# Patient Record
Sex: FEMALE | Race: WHITE | HISPANIC OR LATINO | Employment: FULL TIME | URBAN - METROPOLITAN AREA
[De-identification: names, ages, dates, MRNs, and addresses within clinical notes are randomized per-mention and may not be internally consistent; named-entity substitution may affect disease eponyms.]

---

## 2023-04-28 ENCOUNTER — APPOINTMENT (OUTPATIENT)
Dept: RADIOLOGY | Facility: CLINIC | Age: 38
End: 2023-04-28

## 2023-04-28 ENCOUNTER — APPOINTMENT (OUTPATIENT)
Dept: URGENT CARE | Facility: CLINIC | Age: 38
End: 2023-04-28

## 2023-04-28 DIAGNOSIS — M25.531 ACUTE PAIN OF BOTH WRISTS: Primary | ICD-10-CM

## 2023-04-28 DIAGNOSIS — M25.532 ACUTE PAIN OF BOTH WRISTS: Primary | ICD-10-CM

## 2023-05-06 ENCOUNTER — APPOINTMENT (OUTPATIENT)
Dept: URGENT CARE | Facility: CLINIC | Age: 38
End: 2023-05-06

## 2023-06-06 ENCOUNTER — APPOINTMENT (OUTPATIENT)
Dept: URGENT CARE | Facility: CLINIC | Age: 38
End: 2023-06-06
Payer: OTHER MISCELLANEOUS

## 2023-06-06 PROCEDURE — 99213 OFFICE O/P EST LOW 20 MIN: CPT | Performed by: FAMILY MEDICINE

## 2023-07-07 ENCOUNTER — OFFICE VISIT (OUTPATIENT)
Dept: URGENT CARE | Facility: CLINIC | Age: 38
End: 2023-07-07
Payer: OTHER MISCELLANEOUS
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
## 2023-07-07 PROCEDURE — 99213 OFFICE O/P EST LOW 20 MIN: CPT

## 2023-07-07 NOTE — PROGRESS NOTES
This encounter was created for [facility] orders only . [facility]        NAME: Connor Urena is a 45 y.o. female  : 1985    MRN: [id number]  DATE: 2023  TIME: 1:16 PM    There were no vitals taken for this visit. Assessment and Plan   No primary diagnosis found. No diagnosis found. Patient Instructions       Follow up with PCP in 3-5 days. Proceed to  ER if symptoms worsen. Chief Complaint   No chief complaint on file. History of Present Illness       HPI    Review of Systems   Review of Systems      Current Medications     No current outpatient medications on file. Current Allergies     Allergies as of 2023   • (Not on File)            The following portions of the patient's history were reviewed and updated as appropriate: allergies, current medications, past family history, past medical history, past social history, past surgical history and problem list.     No past medical history on file. No past surgical history on file. No family history on file. Medications have been verified. Objective   There were no vitals taken for this visit.        Physical Exam     Physical Exam

## 2023-07-18 ENCOUNTER — APPOINTMENT (OUTPATIENT)
Dept: URGENT CARE | Facility: CLINIC | Age: 38
End: 2023-07-18
Payer: OTHER MISCELLANEOUS

## 2023-07-18 PROCEDURE — 99213 OFFICE O/P EST LOW 20 MIN: CPT | Performed by: FAMILY MEDICINE

## 2023-08-16 ENCOUNTER — APPOINTMENT (OUTPATIENT)
Dept: URGENT CARE | Facility: CLINIC | Age: 38
End: 2023-08-16
Payer: OTHER MISCELLANEOUS

## 2023-08-16 PROCEDURE — 99213 OFFICE O/P EST LOW 20 MIN: CPT

## 2023-09-15 ENCOUNTER — APPOINTMENT (OUTPATIENT)
Dept: URGENT CARE | Facility: CLINIC | Age: 38
End: 2023-09-15
Payer: OTHER MISCELLANEOUS

## 2023-09-15 PROCEDURE — 99213 OFFICE O/P EST LOW 20 MIN: CPT

## 2023-10-05 ENCOUNTER — TELEPHONE (OUTPATIENT)
Dept: OBGYN CLINIC | Facility: HOSPITAL | Age: 38
End: 2023-10-05

## 2023-10-05 NOTE — TELEPHONE ENCOUNTER
Are you able to contact this patient and make sure a MRI disk, from 8/20/23, is provided at the office visit 10/9/23? Report receieved but need the imaging/films of the Mri.

## 2023-10-06 NOTE — TELEPHONE ENCOUNTER
I saw the report in the [facility] notes. I need the pictures of the MRI.  Patient needs to bring a disk

## 2023-10-09 ENCOUNTER — OFFICE VISIT (OUTPATIENT)
Dept: OBGYN CLINIC | Facility: CLINIC | Age: 38
End: 2023-10-09
Payer: OTHER MISCELLANEOUS

## 2023-10-09 VITALS
BODY MASS INDEX: 24.03 KG/M2 | DIASTOLIC BLOOD PRESSURE: 70 MMHG | WEIGHT: 135.6 LBS | SYSTOLIC BLOOD PRESSURE: 110 MMHG | HEIGHT: 63 IN

## 2023-10-09 DIAGNOSIS — S69.81XA INJURY OF TRIANGULAR FIBROCARTILAGE COMPLEX (TFCC) OF RIGHT WRIST, INITIAL ENCOUNTER: Primary | ICD-10-CM

## 2023-10-09 DIAGNOSIS — M65.4 TENOSYNOVITIS, DE QUERVAIN: ICD-10-CM

## 2023-10-09 PROCEDURE — 99214 OFFICE O/P EST MOD 30 MIN: CPT | Performed by: ORTHOPAEDIC SURGERY

## 2023-10-09 RX ORDER — NAPROXEN 250 MG/1
250 TABLET ORAL 2 TIMES DAILY WITH MEALS
COMMUNITY

## 2023-10-09 RX ORDER — ZALEPLON 10 MG/1
CAPSULE ORAL
COMMUNITY
Start: 2023-09-05

## 2023-10-09 NOTE — PROGRESS NOTES
ASSESSMENT/PLAN:    Assessment:   Right wrist TFCC injury, Right De Quervain's tenosynovitis    Plan:   We discussed with the patient that as she is feeling about 80% better compared to her time of injury today, she can continue nonoperative management for now. We recommended that she continue to follow with her hematologist as scheduled for the new unexplained bruising that she is experiencing throughout her body. We discussed that we could give her an injection at a later date, however it is too soon at this point because she has recently had an injection by an outside surgeon. We will see the patient back in 6 weeks. In the meantime, we have increased her restrictions to allow her to bear weight as tolerated with physical therapy, however she will be work strict to 10 pounds by work for now. Follow Up:  6  week(s)      General Discussions:     Christina Justice Tenosynovitis: The anatomy and physiology of de Quervain's tenosynovitis was discussed with the patient today in the office. Edema and increased contact pressure within the first dorsal extensor compartment at the radial styloid can cause pain, crepitation, and limitation of function. Treatment options include resting thumb spica splints to decrease edema, oral anti-inflammatory medications, home or formal therapy exercises, up to 2 steroid injections within the first dorsal extensor compartment, or surgical release. While majority of patients do respond to conservative treatment, up to 20% may require surgical release.     _____________________________________________________  CHIEF COMPLAINT:  Chief Complaint   Patient presents with   • Right Wrist - Injury     DOI 4/26/23XR 4/28/23 MRI 8/20/23 report in encounters  MRI films in [facility]          SUBJECTIVE:  Margaux Reed is a 45y.o. year old female who presents for initial presentation of right-sided wrist pain.   Patient states that she was at work lifting a pallet, earlier in April of this year, when she had immediate pain to her right wrist.  Patient has subsequently seen urgent care, tried a splint that was given to her, and is continuing to have pain in the right wrist.  She has tried and failed physical therapy. She has seen orthopedic surgeons at an outside institution who performed a radiocarpal injection as well as to corticosteroid injections for English veins tenosynovitis, both with mild relief of pain. However, patient continues to have pain in the right wrist.  She was told by her work that she is not able to return to any form of light duty, that they would like to see her only once she is able to perform at her full duty levels. She would like to know what can be done for her right-sided wrist pain. She has an MRI that was done in August, demonstrating a tear of the distal radioulnar ligament. PAST MEDICAL HISTORY:  History reviewed. No pertinent past medical history. PAST SURGICAL HISTORY:  History reviewed. No pertinent surgical history. FAMILY HISTORY:  History reviewed. No pertinent family history. SOCIAL HISTORY:  Social History     Tobacco Use   • Smoking status: Never   • Smokeless tobacco: Never       MEDICATIONS:    Current Outpatient Medications:   •  Diclofenac Sodium (VOLTAREN) 1 %, APPLY TO AFFECTED AREA FOUR TIMES A DAY, Disp: , Rfl:   •  naproxen (NAPROSYN) 250 mg tablet, Take 250 mg by mouth 2 (two) times a day with meals, Disp: , Rfl:   •  zaleplon (SONATA) 10 MG capsule, TAKE 1 CAPSULE ORALLY AT BEDTIME AS NEEDED FOR INSOMNIA, Disp: , Rfl:     ALLERGIES:  Allergies   Allergen Reactions   • Shellfish-Derived Products - Food Allergy Anaphylaxis       REVIEW OF SYSTEMS:  Pertinent items are noted in HPI. A comprehensive review of systems was negative.     LABS:  HgA1c: No results found for: "HGBA1C"  BMP: No results found for: "GLUCOSE", "CALCIUM", "NA", "K", "CO2", "CL", "BUN", "CREATININE"    _____________________________________________________  PHYSICAL EXAMINATION:  General: well developed and well nourished, alert, oriented times 3 and appears comfortable  Psychiatric: Normal  HEENT: Trachea Midline, No torticollis  Cardiovascular: No discernable arrhythmia  Pulmonary: No wheezing or stridor  Skin: No masses, erythema, lacerations, fluctation, ulcerations  Neurovascular: Sensation Intact to the Median, Ulnar, Radial Nerve, Motor Intact to the Median, Ulnar, Radial Nerve and Pulses Intact    MUSCULOSKELETAL EXAMINATION:  Right side:  Tenderness palpation along radial aspect of dorsal forearm along De Quervains tendon sheath  Positive Finkelstein test  Positive Eickhoff test  Tenderness palpation along dorsal aspect of radiocarpal joints  Negative DRUJ shuck instability test  Positive tenderness over TFCC  Positive fovea test   Negative Tinel's sign   Negative Phalen's test  Positive Cora's    _____________________________________________________  STUDIES REVIEWED:  MRI of right wrist from 8/30 were reviewed in PACS by Dr. Hailee Bowman and demonstrate: Tear of dorsal aspect of TFCC.       PROCEDURES PERFORMED:  Procedures  No Procedures performed today

## 2023-10-09 NOTE — LETTER
October 9, 2023     Patient: Navya Calderon  YOB: 1985  Date of Visit: 10/9/2023      To Whom it May Concern:    Navya Calderon is under my professional care. Michelle Alvarado was seen in my office on 10/9/2023. Michelle Alvarado may return to work with limitations of light duty of 10 lbs until cleared by Orthopedic Surgery . We anticipate that she may be able to lift 20 lbs by that time. If you have any questions or concerns, please don't hesitate to call.          Sincerely,          Jason Robison MD        CC: No Recipients

## 2023-10-10 ENCOUNTER — TELEPHONE (OUTPATIENT)
Age: 38
End: 2023-10-10

## 2023-10-10 NOTE — TELEPHONE ENCOUNTER
Caller: Merlyn Zendejas from [employer]    Doctor: Braxton Bahena    Reason for call:     Faxed offices notes & work note from 10/9/23 visit to fax # [phone].   Completed    Call back#: n/a

## 2023-10-24 DIAGNOSIS — M65.4 TENOSYNOVITIS, DE QUERVAIN: Primary | ICD-10-CM

## 2023-10-24 DIAGNOSIS — S69.81XA INJURY OF TRIANGULAR FIBROCARTILAGE COMPLEX (TFCC) OF RIGHT WRIST, INITIAL ENCOUNTER: ICD-10-CM

## 2023-11-20 ENCOUNTER — OFFICE VISIT (OUTPATIENT)
Dept: OBGYN CLINIC | Facility: CLINIC | Age: 38
End: 2023-11-20
Payer: OTHER MISCELLANEOUS

## 2023-11-20 VITALS
HEIGHT: 63 IN | SYSTOLIC BLOOD PRESSURE: 103 MMHG | BODY MASS INDEX: 23.92 KG/M2 | WEIGHT: 135 LBS | DIASTOLIC BLOOD PRESSURE: 80 MMHG

## 2023-11-20 DIAGNOSIS — M65.4 TENOSYNOVITIS, DE QUERVAIN: Primary | ICD-10-CM

## 2023-11-20 DIAGNOSIS — S69.81XA INJURY OF TRIANGULAR FIBROCARTILAGE COMPLEX (TFCC) OF RIGHT WRIST, INITIAL ENCOUNTER: ICD-10-CM

## 2023-11-20 PROCEDURE — 20550 NJX 1 TENDON SHEATH/LIGAMENT: CPT | Performed by: ORTHOPAEDIC SURGERY

## 2023-11-20 PROCEDURE — 99213 OFFICE O/P EST LOW 20 MIN: CPT | Performed by: ORTHOPAEDIC SURGERY

## 2023-11-20 PROCEDURE — 20605 DRAIN/INJ JOINT/BURSA W/O US: CPT | Performed by: ORTHOPAEDIC SURGERY

## 2023-11-20 RX ORDER — LIDOCAINE HYDROCHLORIDE 10 MG/ML
1 INJECTION, SOLUTION INFILTRATION; PERINEURAL
Status: COMPLETED | OUTPATIENT
Start: 2023-11-20 | End: 2023-11-20

## 2023-11-20 RX ORDER — BETAMETHASONE SODIUM PHOSPHATE AND BETAMETHASONE ACETATE 3; 3 MG/ML; MG/ML
6 INJECTION, SUSPENSION INTRA-ARTICULAR; INTRALESIONAL; INTRAMUSCULAR; SOFT TISSUE
Status: COMPLETED | OUTPATIENT
Start: 2023-11-20 | End: 2023-11-20

## 2023-11-20 RX ADMIN — LIDOCAINE HYDROCHLORIDE 1 ML: 10 INJECTION, SOLUTION INFILTRATION; PERINEURAL at 17:15

## 2023-11-20 RX ADMIN — BETAMETHASONE SODIUM PHOSPHATE AND BETAMETHASONE ACETATE 6 MG: 3; 3 INJECTION, SUSPENSION INTRA-ARTICULAR; INTRALESIONAL; INTRAMUSCULAR; SOFT TISSUE at 17:15

## 2023-11-20 NOTE — LETTER
November 20, 2023     Patient: Saeed Jiang  YOB: 1985  Date of Visit: 11/20/2023      To Whom it May Concern:    Saeed Jiang is under my professional care. Denisse Hickman was seen in my office on 11/20/2023. Denisse Hickman will continue her current work restrictions. She will be re-evaluated in 8 weeks time. If you have any questions or concerns, please don't hesitate to call.          Sincerely,          Randi Kothari MD

## 2023-11-20 NOTE — PROGRESS NOTES
ASSESSMENT/PLAN:    Assessment:   Right wrist TFCC injury, Right De Quervain's tenosynovitis    Plan:   A final right De Quervain's CSI was performed in the office without complication. A repeat right radiocarpal joint CSI was performed in the office without complication. Post injection protocol/expectations were reviewed. She will continue OT for work hardening, updated script was provided. We briefly discussed surgical intervention if symptoms worsen or fail to improve. Updated work note was provided, continuing same restrictions. Follow Up:  8  week(s)    To Do Next Visit:  Re-evaluation     _____________________________________________________  CHIEF COMPLAINT:  Chief Complaint   Patient presents with    Right Wrist - Follow-up     Right wrist TFCC injury, Right De Quervain's tenosynovitis         SUBJECTIVE:  Suma Shelton is a 45 y.o. female who presents for follow up regarding  Right wrist TFCC injury and right De Quervain's tenosynovitis. Since last visit, Suma Shelton has been attending therapy. She is working on work hardening exercises in therapy. She notes 2/10 pain to her right wrist at times. Pain is to the dorsal radial and dorsal ulnar aspect of her wrist. She feels pain is worse due to the colder weather. She also notes pain with overhead weighted activities in therapy. She is using Voltaren Gel and taking Naproxen as needed for pain control. She is wearing a cock up wrist brace as needed with activities. Overall she feels her symptoms have improved approx. 80 percent. Work status: [employer]    PAST MEDICAL HISTORY:  History reviewed. No pertinent past medical history. PAST SURGICAL HISTORY:  History reviewed. No pertinent surgical history. FAMILY HISTORY:  History reviewed. No pertinent family history.     SOCIAL HISTORY:  Social History     Tobacco Use    Smoking status: Never    Smokeless tobacco: Never       MEDICATIONS:    Current Outpatient Medications:     Diclofenac Sodium I have reviewed discharge instructions with the patient. The patient verbalized understanding. Patient was not given prescription. Pt in wheelchair to lobby upon discharge home. (VOLTAREN) 1 %, APPLY TO AFFECTED AREA FOUR TIMES A DAY, Disp: , Rfl:     naproxen (NAPROSYN) 250 mg tablet, Take 250 mg by mouth 2 (two) times a day with meals, Disp: , Rfl:     zaleplon (SONATA) 10 MG capsule, TAKE 1 CAPSULE ORALLY AT BEDTIME AS NEEDED FOR INSOMNIA, Disp: , Rfl:     ALLERGIES:  Allergies   Allergen Reactions    Shellfish-Derived Products - Food Allergy Anaphylaxis       REVIEW OF SYSTEMS:  Pertinent items are noted in HPI. A comprehensive review of systems was negative. LABS:  HgA1c: No results found for: "HGBA1C"  BMP: No results found for: "GLUCOSE", "CALCIUM", "NA", "K", "CO2", "CL", "BUN", "CREATININE"        _____________________________________________________  PHYSICAL EXAMINATION:  Vital signs: /80   Ht 5' 3" (1.6 m)   Wt 61.2 kg (135 lb)   BMI 23.91 kg/m²   General: well developed and well nourished, alert, oriented times 3, and appears comfortable  Psychiatric: Normal  HEENT: Trachea Midline, No torticollis  Cardiovascular: No discernable arrhythmia  Pulmonary: No wheezing or stridor  Abdomen: No rebound or guarding  Extremities: No peripheral edema  Skin: No masses, erythema, lacerations, fluctation, ulcerations  Neurovascular: Sensation Intact to the Median, Ulnar, Radial Nerve, Motor Intact to the Median, Ulnar, Radial Nerve, and Pulses Intact    MUSCULOSKELETAL EXAMINATION:    RIGHT SIDE:  Wrist: TFCC tender to palpation, + finkelstein test, + cyst to 1st dorsal compartment     _____________________________________________________  STUDIES REVIEWED:  No Studies to review      PROCEDURES PERFORMED:  Hand/upper extremity injection: R extensor compartment 1  Universal Protocol:  Consent: Verbal consent obtained. Written consent not obtained.   Risks and benefits: risks, benefits and alternatives were discussed  Consent given by: patient  Time out: Immediately prior to procedure a "time out" was called to verify the correct patient, procedure, equipment, support staff and site/side marked as required. Patient understanding: patient states understanding of the procedure being performed  Site marked: the operative site was marked  Patient identity confirmed: verbally with patient  Supporting Documentation  Indications: pain   Procedure Details  Condition:de Quervain's tenosynovitis Site: R extensor compartment 1   Preparation: Patient was prepped and draped in the usual sterile fashion  Needle size: 25 G  Ultrasound guidance: no  Medications administered: 1 mL lidocaine 1 %; 6 mg betamethasone acetate-betamethasone sodium phosphate 6 (3-3) mg/mL  Patient tolerance: patient tolerated the procedure well with no immediate complications  Dressing:  Sterile dressing applied       Medium joint arthrocentesis: R radiocarpal  Universal Protocol:  Consent: Verbal consent obtained. Written consent not obtained. Risks and benefits: risks, benefits and alternatives were discussed  Consent given by: patient  Time out: Immediately prior to procedure a "time out" was called to verify the correct patient, procedure, equipment, support staff and site/side marked as required.   Patient understanding: patient states understanding of the procedure being performed  Site marked: the operative site was marked  Patient identity confirmed: verbally with patient  Supporting Documentation  Indications: pain   Procedure Details  Location: wrist - R radiocarpal  Preparation: Patient was prepped and draped in the usual sterile fashion  Needle size: 25 G  Ultrasound guidance: no  Medications administered: 1 mL lidocaine 1 %; 6 mg betamethasone acetate-betamethasone sodium phosphate 6 (3-3) mg/mL    Patient tolerance: patient tolerated the procedure well with no immediate complications  Dressing:  Sterile dressing applied             Scribe Attestation      I,:  Yoselin Alcaraz am acting as a scribe while in the presence of the attending physician.:       I,:  Paul Samson MD personally performed the services described in this documentation    as scribed in my presence.:

## 2023-11-21 ENCOUNTER — TELEPHONE (OUTPATIENT)
Dept: OBGYN CLINIC | Facility: CLINIC | Age: 38
End: 2023-11-21

## 2023-11-21 ENCOUNTER — TELEPHONE (OUTPATIENT)
Age: 38
End: 2023-11-21

## 2023-11-21 NOTE — TELEPHONE ENCOUNTER
Caller: Mariano Ortiz [facility]    Doctor/Office: Abis[facility]    What needs to be faxed: Office note/work status    ATTN to: Glo Reyes    Fax#: [phone]      Documents were successfully e-faxed

## 2023-11-21 NOTE — TELEPHONE ENCOUNTER
Received incoming fax from [employer] requesting office note, Letter, PT/OT script be faxed to [phone]. Faxed information received confirmation.

## 2023-11-27 ENCOUNTER — TELEPHONE (OUTPATIENT)
Age: 38
End: 2023-11-27

## 2023-11-27 NOTE — TELEPHONE ENCOUNTER
Called and spoke with Courtney Claros, pt has been out since April and Courtney Claros stated she was wondering if her weight restriction to only lift 10lbs should be increased as she has been lifting 10lbs for 18 sessions with PT. Courtney Claros would like to know where does the pt go from here as it seems like she is not getting better and will not be able to return to work until she is able to lift more weight.

## 2023-11-27 NOTE — TELEPHONE ENCOUNTER
Called and spoke with Mehul Prado and relayed Chiara Escamilla msg, no further questions at this time.

## 2023-11-27 NOTE — TELEPHONE ENCOUNTER
Caller: Georgiann Hashimoto    Doctor: [facility]    Reason for call: Requesting clarification her patients work status. She is currently out of work and Miller Shadow is asking if the patient needs to remain out of work until her follow up or can she work with restrictions.      Call back#: [phone]  FAX#: [phone]

## 2023-11-27 NOTE — TELEPHONE ENCOUNTER
Please advise Kathy Zelaya that we would like to keep her on her continued lifting restriction at this time. If symptoms do not seem to improve that the next time that she is reevaluated, there may be the discussion of surgical intervention. Thank you.

## 2023-11-27 NOTE — TELEPHONE ENCOUNTER
Can we please advise work comp that her work restrictions are the same from her previous visit which are the following:  [employer] may return to work with limitations of light duty of 10 lbs. thank you.

## 2024-01-22 ENCOUNTER — OFFICE VISIT (OUTPATIENT)
Dept: OBGYN CLINIC | Facility: CLINIC | Age: 39
End: 2024-01-22
Payer: OTHER MISCELLANEOUS

## 2024-01-22 VITALS
BODY MASS INDEX: 24.06 KG/M2 | DIASTOLIC BLOOD PRESSURE: 84 MMHG | WEIGHT: 135.8 LBS | HEIGHT: 63 IN | SYSTOLIC BLOOD PRESSURE: 126 MMHG

## 2024-01-22 DIAGNOSIS — M65.4 TENOSYNOVITIS, DE QUERVAIN: Primary | ICD-10-CM

## 2024-01-22 DIAGNOSIS — S69.81XD INJURY OF TRIANGULAR FIBROCARTILAGE COMPLEX (TFCC) OF RIGHT WRIST, SUBSEQUENT ENCOUNTER: ICD-10-CM

## 2024-01-22 PROCEDURE — 99214 OFFICE O/P EST MOD 30 MIN: CPT | Performed by: ORTHOPAEDIC SURGERY

## 2024-01-22 NOTE — PROGRESS NOTES
ASSESSMENT/PLAN:    Assessment:   Right wrist TFCC injury/tear, Right De Quervain's tenosynovitis      Plan:   Right wrist De Quervain's release and right wrist arthroscopy with TFCC debridement vs repair was discussed at length including risks and benefits.  Risks of surgery are listed below. Risks of surgery consist of but not limited to bleeding, infection, stiffness, pain, anesthesia complications, failure, need for further surgery, injury to nerves blood vessels and surrounding structures, etc. We discussed the De Quervain's release has an approx. 98 percent success rate and the wrist arthroscopy has an approx. 93/94 percent success rate.   If a repair is performed, we discussed a longer recovery as to if only a debridement is performed. If a repair is performed, we discussed a munster cast for 6 weeks followed by OT, which would be an approx. 4 month recovery.   Juhi elected to proceed with surgical intervention and informed surgical consent was signed.     Updated work note was provided, continuing current 10 lb restrictions. She will be re-evaluated in the office 10-14 days post op.     Follow Up:  After Surgery    To Do Next Visit:  Sutures out      Operative Discussions:     De Quervain Release:   The anatomy and physiology of de Quervain's tenosynovitis was discussed with the patient today in the office.  Edema and increased contact pressure within the first dorsal extensor compartment at the radial styloid can cause pain, crepitation, and limitation of function.  Treatment options include resting thumb spica splints to decrease edema, oral anti-inflammatory medications, home or formal therapy exercises, up to 2 steroid injections within the first dorsal extensor compartment, or surgical release.  While majority of patients do respond to conservative treatment, up to 20% may require surgical release. The patient has elected to undergo a release of the first dorsal extensor compartment (de Quervain's).   A small incision will be made over the radial styloid of the wrist, the tendon sheath holding the abductor pollicis longus and extensor pollicis brevis will be released.  In the postoperative period, light activities are allowed immediately, driving is allowed when narcotic medication has stopped, and the incision may get wet after 2 days.  Heavy activities (lifting more than approximately 10 pounds) will be allowed after the follow up appointment in 1-2 weeks.  While the pain and discomfort within the wrist typically improves rapidly, some residual discomfort may be present for up to 6 weeks.  The patient may notice temporary numbness within the superficial sensory branch of the radial nerve secondary to a traction neuropraxia.  This is often a self-limiting condition.  The patient has an understanding of the above mentioned discussion.  Approximate success rate is 98%.The risks and benefits of the procedure were explained to the patient, which include, but are not limited to: Bleeding, infection, recurrence, pain, scar, damage to tendons, damage to nerves, and damage to blood vessels, failure to give desired results and complications related to anesthesia.  These risks, along with alternative conservative treatment options, and postoperative protocols were voiced back and understood by the patient.  All questions were answered to the patient's satisfaction.  The patient agrees to comply with a standard postoperative protocol, and is willing to proceed.  Education was provided via written and auditory forms.  There were no barriers to learning. Written handouts regarding wound care, incision and scar care, and general preoperative information was provided to the patient.  Prior to surgery, the patient may be requested to stop all anti-inflammatory medications.  Prophylactic aspirin, Plavix, and Coumadin may be allowed to be continued.  Medications including vitamin E., ginkgo, and fish oil are requested to be  stopped approximately one week prior to surgery.  Hypertensive medications and beta blockers, if taken, should be continued.  Standard Consent: The risks and benefits of the procedure were explained to the patient, which include, but are not limited to: Bleeding, infection, recurrence, pain, scar, damage to tendons, damage to nerves, and damage to blood vessels, failure to give desired results and complications related to anesthesia.  These risks, along with alternative conservative treatment options, and postoperative protocols were voiced back and understood by the patient.  All questions were answered to the patient's satisfaction.  The patient agrees to comply with a standard postoperative protocol, and is willing to proceed.  Education was provided via written and auditory forms.  There were no barriers to learning. Written handouts regarding wound care, incision and scar care, and general preoperative information was provided to the patient.  Prior to surgery, the patient may be requested to stop all anti-inflammatory medications.  Prophylactic aspirin, Plavix, and Coumadin may be allowed to be continued.  Medications including vitamin E., ginkgo, and fish oil are requested to be stopped approximately one week prior to surgery.  Hypertensive medications and beta blockers, if taken, should be continued.      _____________________________________________________  CHIEF COMPLAINT:  Chief Complaint   Patient presents with    Right Wrist - Follow-up     TFCC- Radiocarpal injection- 11/20/23  De Quervain's tenosynovitis- 1st injection 11/20/23         SUBJECTIVE:  Juhi Tena is a 38 y.o. female who presents for follow up regarding her right wrist. She was last seen in the office on 11/20/23, at which time she underwent a right wrist De Quervain's CSI and right radiocarpal joint CSI's. She notes that the CSI's were beneficial for approx. 3-4 weeks before pain starts to return. She notes pain to her right thumb MCP  "joint as well as dorsal radial wrist. Pain will worsen after therapy, with cold weather as well as with small tasks, such as buckling her daughters seat belt. She is attending therapy for work hardening. She is currently out of work as work cannot accommodate her restrictions. She will wear a cock up wrist brace as needed. She is taking Naproxen, Tylenol and using Voltaren Gel.    Work status: [employer]    PAST MEDICAL HISTORY:  History reviewed. No pertinent past medical history.    PAST SURGICAL HISTORY:  History reviewed. No pertinent surgical history.    FAMILY HISTORY:  History reviewed. No pertinent family history.    SOCIAL HISTORY:  Social History     Tobacco Use    Smoking status: Never    Smokeless tobacco: Never       MEDICATIONS:    Current Outpatient Medications:     Diclofenac Sodium (VOLTAREN) 1 %, APPLY TO AFFECTED AREA FOUR TIMES A DAY, Disp: , Rfl:     naproxen (NAPROSYN) 250 mg tablet, Take 250 mg by mouth 2 (two) times a day with meals, Disp: , Rfl:     zaleplon (SONATA) 10 MG capsule, TAKE 1 CAPSULE ORALLY AT BEDTIME AS NEEDED FOR INSOMNIA, Disp: , Rfl:     ALLERGIES:  Allergies   Allergen Reactions    Shellfish-Derived Products - Food Allergy Anaphylaxis       REVIEW OF SYSTEMS:  Pertinent items are noted in HPI.  A comprehensive review of systems was negative.    LABS:  HgA1c: No results found for: \"HGBA1C\"  BMP: No results found for: \"GLUCOSE\", \"CALCIUM\", \"NA\", \"K\", \"CO2\", \"CL\", \"BUN\", \"CREATININE\"        _____________________________________________________  PHYSICAL EXAMINATION:  Vital signs: /84   Ht 5' 3\" (1.6 m)   Wt 61.6 kg (135 lb 12.8 oz)   BMI 24.06 kg/m²   General: well developed and well nourished, alert, oriented times 3, and appears comfortable  Psychiatric: Normal  HEENT: Trachea Midline, No torticollis  Cardiovascular: No discernable arrhythmia  Pulmonary: No wheezing or stridor  Abdomen: No rebound or guarding  Extremities: No peripheral edema  Skin: No masses, erythema, " lacerations, fluctation, ulcerations  Neurovascular: Sensation Intact to the Median, Ulnar, Radial Nerve, Motor Intact to the Median, Ulnar, Radial Nerve, and Pulses Intact    MUSCULOSKELETAL EXAMINATION:    RIGHT SIDE: WRIST: + finkelstein test, No thumb MP or IP instability, No thumb IP or MP swelling, swelling over 1st dorsal extensor compartment, retinacular cyst to 1st dorsal extensor compartment, TFCC tenderness, pre styloid recess tenderness, no other bony or soft tissue tenderness, full composite fist   No instability of DRUJ, no instability of RC or MC joint.  No ECU subluxation.    _____________________________________________________  STUDIES REVIEWED:  No Studies to review      PROCEDURES PERFORMED:  Procedures  No Procedures performed today    Scribe Attestation      I,:  Iwona Alcaraz am acting as a scribe while in the presence of the attending physician.:       I,:  Louie Barone MD personally performed the services described in this documentation    as scribed in my presence.:

## 2024-01-22 NOTE — LETTER
January 22, 2024     Patient: Juhi Tena  YOB: 1985  Date of Visit: 1/22/2024      To Whom it May Concern:    Juhi Tena is under my professional care. Juhi was seen in my office on 1/22/2024. Juhi will continue her current 10 lb restrictions. She will be re-evaluated in the office 10-14 days post op.     If you have any questions or concerns, please don't hesitate to call.         Sincerely,          Louie Barone MD

## 2024-05-03 RX ORDER — DIPHENHYDRAMINE HCL 25 MG
25 CAPSULE ORAL EVERY 6 HOURS PRN
COMMUNITY

## 2024-05-03 RX ORDER — IBUPROFEN 200 MG
TABLET ORAL EVERY 6 HOURS PRN
COMMUNITY

## 2024-05-03 RX ORDER — DIPHENOXYLATE HYDROCHLORIDE AND ATROPINE SULFATE 2.5; .025 MG/1; MG/1
1 TABLET ORAL DAILY
COMMUNITY

## 2024-05-03 RX ORDER — ACETAMINOPHEN 325 MG/1
650 TABLET ORAL EVERY 6 HOURS PRN
COMMUNITY

## 2024-05-03 NOTE — PRE-PROCEDURE INSTRUCTIONS
Pre-Surgery Instructions:   Medication Instructions    acetaminophen (TYLENOL) 325 mg tablet Uses PRN- OK to take day of surgery    Diclofenac Sodium (VOLTAREN) 1 % Stop taking 3 days prior to surgery.    diphenhydrAMINE (BENADRYL) 25 mg capsule Uses PRN- DO NOT take day of surgery    Green Tea, Estefania sinensis, (GREEN TEA EXTRACT PO) Stop taking 7 days prior to surgery.    ibuprofen (MOTRIN) 200 mg tablet Stop taking 3 days prior to surgery.    multivitamin (THERAGRAN) TABS Stop taking 7 days prior to surgery.    naproxen (NAPROSYN) 250 mg tablet Stop taking 3 days prior to surgery.     Pt verbalizes understanding of the following:    Please reference your “My Surgical Experience Booklet” for additional information to prepare for your upcoming surgery.      - DO NOT EAT OR DRINK ANYTHING after midnight on the evening before your procedure including coffee, tea, gum or hard candy.    - ONLY SIPS OF WATER with your medications are allowed on the morning of your procedure.  - Avoid OTC non-directed Vit/ Suppl/ Herbals 7 days prior to surgery to ensure no drug interactions with perioperative surgical/ anesthetic meds  - Avoid NSAIDs 3 days prior. Tylenol is ok to take as needed.   - Avoid ASA containing products 5 days prior, unless otherwise instructed by your provider   - Bring a list of meds you take at home with your last dose noted    - Avoid alcohol 24hrs before your surgery.     - Follow the pre surgery showering instructions as listed in the “My Surgical Experience Booklet” or otherwise provided by your surgeon's office.  - Bathing instructions, has chg, neck down, no genitals  - No lotions, powders, sprays, deodorant, perfume, jewelry, body piercings, false lashes or make-up  - Do not use a blade to shave the surgical area 1 week before surgery. It is ok to use clean electric clippers up to 24 hours before surgery. Do not shave any body parts with a razor within 24hrs.  - Do not use dry shampoo, hair spray,  hair gel, or any type of hair products.   - Remove nail polish, including gel polish, and any artificial, gel, or acrylic nails if possible.    - For outpatient surgery, arrange for someone to drive you home after the procedure & stay with you until the next morning. Visitor guidelines discussed.   - Bring insurance cards & photo id    - Please Bring a case for your glasses.  - Leave all valuables such as credit cards, money & jewelry at home  - Wear causal clothing that is easy to take on and off. Consider your type of surgery.    - Notify surgeon if you develop any new illnesses, exposure, develop a rash/ open wounds or have additional questions prior to your surgery.    - Did the surgeon's office give you any other special instructions? no  - Did surgeon require any clearances? no    You will receive a call one business day prior to surgery with an arrival time and hospital directions. If your surgery is scheduled on a Monday, the hospital will be calling you on the Friday prior to your surgery.     Please confirm the visitor policy for the day of your procedure when you receive your phone call with an arrival time.

## 2024-05-16 ENCOUNTER — HOSPITAL ENCOUNTER (OUTPATIENT)
Facility: HOSPITAL | Age: 39
Setting detail: OUTPATIENT SURGERY
Discharge: HOME | End: 2024-05-16
Attending: ORTHOPAEDIC SURGERY | Admitting: ORTHOPAEDIC SURGERY
Payer: OTHER MISCELLANEOUS

## 2024-05-16 ENCOUNTER — ANESTHESIA (OUTPATIENT)
Dept: PERIOP | Facility: HOSPITAL | Age: 39
End: 2024-05-16
Payer: OTHER MISCELLANEOUS

## 2024-05-16 ENCOUNTER — ANESTHESIA EVENT (OUTPATIENT)
Dept: PERIOP | Facility: HOSPITAL | Age: 39
End: 2024-05-16
Payer: OTHER MISCELLANEOUS

## 2024-05-16 VITALS
HEIGHT: 63 IN | WEIGHT: 142 LBS | RESPIRATION RATE: 15 BRPM | SYSTOLIC BLOOD PRESSURE: 118 MMHG | OXYGEN SATURATION: 99 % | DIASTOLIC BLOOD PRESSURE: 74 MMHG | TEMPERATURE: 97 F | BODY MASS INDEX: 25.16 KG/M2 | HEART RATE: 68 BPM

## 2024-05-16 DIAGNOSIS — M65.4 TENOSYNOVITIS, DE QUERVAIN: ICD-10-CM

## 2024-05-16 DIAGNOSIS — S69.81XD INJURY OF TRIANGULAR FIBROCARTILAGE COMPLEX (TFCC) OF RIGHT WRIST, SUBSEQUENT ENCOUNTER: ICD-10-CM

## 2024-05-16 LAB
EXT PREGNANCY TEST URINE: NEGATIVE
EXT. CONTROL: NORMAL

## 2024-05-16 PROCEDURE — NC001 PR NO CHARGE: Performed by: ORTHOPAEDIC SURGERY

## 2024-05-16 PROCEDURE — 88304 TISSUE EXAM BY PATHOLOGIST: CPT | Performed by: PATHOLOGY

## 2024-05-16 PROCEDURE — 25111 REMOVE WRIST TENDON LESION: CPT | Performed by: PHYSICIAN ASSISTANT

## 2024-05-16 PROCEDURE — 81025 URINE PREGNANCY TEST: CPT | Performed by: ORTHOPAEDIC SURGERY

## 2024-05-16 PROCEDURE — 29846 WRIST ARTHROSCOPY/SURGERY: CPT | Performed by: PHYSICIAN ASSISTANT

## 2024-05-16 PROCEDURE — 25111 REMOVE WRIST TENDON LESION: CPT | Performed by: ORTHOPAEDIC SURGERY

## 2024-05-16 PROCEDURE — 25000 INCISION OF TENDON SHEATH: CPT | Performed by: PHYSICIAN ASSISTANT

## 2024-05-16 PROCEDURE — 29846 WRIST ARTHROSCOPY/SURGERY: CPT | Performed by: ORTHOPAEDIC SURGERY

## 2024-05-16 PROCEDURE — 25000 INCISION OF TENDON SHEATH: CPT | Performed by: ORTHOPAEDIC SURGERY

## 2024-05-16 RX ORDER — SODIUM CHLORIDE, SODIUM LACTATE, POTASSIUM CHLORIDE, CALCIUM CHLORIDE 600; 310; 30; 20 MG/100ML; MG/100ML; MG/100ML; MG/100ML
20 INJECTION, SOLUTION INTRAVENOUS CONTINUOUS
Status: DISCONTINUED | OUTPATIENT
Start: 2024-05-16 | End: 2024-05-16 | Stop reason: HOSPADM

## 2024-05-16 RX ORDER — TRAMADOL HYDROCHLORIDE 50 MG/1
50 TABLET ORAL EVERY 6 HOURS PRN
Qty: 10 TABLET | Refills: 0 | Status: SHIPPED | OUTPATIENT
Start: 2024-05-16

## 2024-05-16 RX ORDER — ACETAMINOPHEN 325 MG/1
650 TABLET ORAL EVERY 6 HOURS PRN
Status: DISCONTINUED | OUTPATIENT
Start: 2024-05-16 | End: 2024-05-16 | Stop reason: HOSPADM

## 2024-05-16 RX ORDER — FENTANYL CITRATE 50 UG/ML
INJECTION, SOLUTION INTRAMUSCULAR; INTRAVENOUS AS NEEDED
Status: DISCONTINUED | OUTPATIENT
Start: 2024-05-16 | End: 2024-05-16

## 2024-05-16 RX ORDER — SENNOSIDES 8.6 MG
650 CAPSULE ORAL EVERY 8 HOURS PRN
Qty: 30 TABLET | Refills: 0 | Status: SHIPPED | OUTPATIENT
Start: 2024-05-16 | End: 2024-05-24 | Stop reason: SDUPTHER

## 2024-05-16 RX ORDER — DEXAMETHASONE SODIUM PHOSPHATE 10 MG/ML
INJECTION, SOLUTION INTRAMUSCULAR; INTRAVENOUS AS NEEDED
Status: DISCONTINUED | OUTPATIENT
Start: 2024-05-16 | End: 2024-05-16

## 2024-05-16 RX ORDER — MIDAZOLAM HYDROCHLORIDE 2 MG/2ML
INJECTION, SOLUTION INTRAMUSCULAR; INTRAVENOUS AS NEEDED
Status: DISCONTINUED | OUTPATIENT
Start: 2024-05-16 | End: 2024-05-16

## 2024-05-16 RX ORDER — LIDOCAINE HYDROCHLORIDE 10 MG/ML
INJECTION, SOLUTION EPIDURAL; INFILTRATION; INTRACAUDAL; PERINEURAL AS NEEDED
Status: DISCONTINUED | OUTPATIENT
Start: 2024-05-16 | End: 2024-05-16

## 2024-05-16 RX ORDER — KETAMINE HCL IN NACL, ISO-OSM 100MG/10ML
SYRINGE (ML) INJECTION AS NEEDED
Status: DISCONTINUED | OUTPATIENT
Start: 2024-05-16 | End: 2024-05-16

## 2024-05-16 RX ORDER — CEFAZOLIN SODIUM 1 G/50ML
1000 SOLUTION INTRAVENOUS ONCE
Status: COMPLETED | OUTPATIENT
Start: 2024-05-16 | End: 2024-05-16

## 2024-05-16 RX ORDER — FENTANYL CITRATE/PF 50 MCG/ML
25 SYRINGE (ML) INJECTION
Status: DISCONTINUED | OUTPATIENT
Start: 2024-05-16 | End: 2024-05-16 | Stop reason: HOSPADM

## 2024-05-16 RX ORDER — SODIUM CHLORIDE, SODIUM LACTATE, POTASSIUM CHLORIDE, CALCIUM CHLORIDE 600; 310; 30; 20 MG/100ML; MG/100ML; MG/100ML; MG/100ML
INJECTION, SOLUTION INTRAVENOUS CONTINUOUS PRN
Status: DISCONTINUED | OUTPATIENT
Start: 2024-05-16 | End: 2024-05-16

## 2024-05-16 RX ORDER — PROPOFOL 10 MG/ML
INJECTION, EMULSION INTRAVENOUS CONTINUOUS PRN
Status: DISCONTINUED | OUTPATIENT
Start: 2024-05-16 | End: 2024-05-16

## 2024-05-16 RX ORDER — LIDOCAINE HYDROCHLORIDE 20 MG/ML
INJECTION, SOLUTION EPIDURAL; INFILTRATION; INTRACAUDAL; PERINEURAL AS NEEDED
Status: DISCONTINUED | OUTPATIENT
Start: 2024-05-16 | End: 2024-05-16

## 2024-05-16 RX ORDER — ONDANSETRON 2 MG/ML
INJECTION INTRAMUSCULAR; INTRAVENOUS AS NEEDED
Status: DISCONTINUED | OUTPATIENT
Start: 2024-05-16 | End: 2024-05-16

## 2024-05-16 RX ORDER — COVID-19 ANTIGEN TEST
220 KIT MISCELLANEOUS 2 TIMES DAILY
Qty: 60 CAPSULE | Refills: 0 | Status: SHIPPED | OUTPATIENT
Start: 2024-05-16 | End: 2024-06-15

## 2024-05-16 RX ORDER — PHENYLEPHRINE HCL IN 0.9% NACL 1 MG/10 ML
SYRINGE (ML) INTRAVENOUS AS NEEDED
Status: DISCONTINUED | OUTPATIENT
Start: 2024-05-16 | End: 2024-05-16

## 2024-05-16 RX ORDER — TRAMADOL HYDROCHLORIDE 50 MG/1
50 TABLET ORAL EVERY 6 HOURS PRN
Status: DISCONTINUED | OUTPATIENT
Start: 2024-05-16 | End: 2024-05-16 | Stop reason: HOSPADM

## 2024-05-16 RX ORDER — GLYCOPYRROLATE 0.2 MG/ML
INJECTION INTRAMUSCULAR; INTRAVENOUS AS NEEDED
Status: DISCONTINUED | OUTPATIENT
Start: 2024-05-16 | End: 2024-05-16

## 2024-05-16 RX ORDER — PROPOFOL 10 MG/ML
INJECTION, EMULSION INTRAVENOUS AS NEEDED
Status: DISCONTINUED | OUTPATIENT
Start: 2024-05-16 | End: 2024-05-16

## 2024-05-16 RX ORDER — ONDANSETRON 2 MG/ML
4 INJECTION INTRAMUSCULAR; INTRAVENOUS EVERY 6 HOURS PRN
Status: DISCONTINUED | OUTPATIENT
Start: 2024-05-16 | End: 2024-05-16 | Stop reason: HOSPADM

## 2024-05-16 RX ORDER — BUPIVACAINE HYDROCHLORIDE 5 MG/ML
INJECTION, SOLUTION EPIDURAL; INTRACAUDAL AS NEEDED
Status: DISCONTINUED | OUTPATIENT
Start: 2024-05-16 | End: 2024-05-16

## 2024-05-16 RX ADMIN — Medication 10 MG: at 10:53

## 2024-05-16 RX ADMIN — Medication 30 MG: at 10:12

## 2024-05-16 RX ADMIN — LIDOCAINE HYDROCHLORIDE 50 MG: 10 INJECTION, SOLUTION EPIDURAL; INFILTRATION; INTRACAUDAL; PERINEURAL at 10:05

## 2024-05-16 RX ADMIN — CEFAZOLIN SODIUM 1000 MG: 1 SOLUTION INTRAVENOUS at 10:13

## 2024-05-16 RX ADMIN — LIDOCAINE HYDROCHLORIDE 2 ML: 20 INJECTION, SOLUTION EPIDURAL; INFILTRATION; INTRACAUDAL; PERINEURAL at 09:34

## 2024-05-16 RX ADMIN — DEXAMETHASONE SODIUM PHOSPHATE 10 MG: 10 INJECTION, SOLUTION INTRAMUSCULAR; INTRAVENOUS at 10:05

## 2024-05-16 RX ADMIN — Medication 100 MCG: at 11:07

## 2024-05-16 RX ADMIN — MIDAZOLAM 2 MG: 1 INJECTION INTRAMUSCULAR; INTRAVENOUS at 09:31

## 2024-05-16 RX ADMIN — GLYCOPYRROLATE 0.2 MG: 0.2 INJECTION INTRAMUSCULAR; INTRAVENOUS at 10:46

## 2024-05-16 RX ADMIN — PROPOFOL 100 MCG/KG/MIN: 10 INJECTION, EMULSION INTRAVENOUS at 10:05

## 2024-05-16 RX ADMIN — FENTANYL CITRATE 50 MCG: 50 INJECTION INTRAMUSCULAR; INTRAVENOUS at 09:31

## 2024-05-16 RX ADMIN — Medication 200 MCG: at 10:44

## 2024-05-16 RX ADMIN — DEXAMETHASONE SODIUM PHOSPHATE 4 MG: 10 INJECTION, SOLUTION INTRAMUSCULAR; INTRAVENOUS at 09:35

## 2024-05-16 RX ADMIN — SODIUM CHLORIDE, SODIUM LACTATE, POTASSIUM CHLORIDE, AND CALCIUM CHLORIDE: .6; .31; .03; .02 INJECTION, SOLUTION INTRAVENOUS at 10:05

## 2024-05-16 RX ADMIN — Medication 10 MG: at 11:13

## 2024-05-16 RX ADMIN — GLYCOPYRROLATE 0.2 MG: 0.2 INJECTION INTRAMUSCULAR; INTRAVENOUS at 10:10

## 2024-05-16 RX ADMIN — BUPIVACAINE HYDROCHLORIDE 30 ML: 5 INJECTION, SOLUTION EPIDURAL; INTRACAUDAL; PERINEURAL at 09:35

## 2024-05-16 RX ADMIN — PROPOFOL 60 MG: 10 INJECTION, EMULSION INTRAVENOUS at 10:05

## 2024-05-16 RX ADMIN — FENTANYL CITRATE 50 MCG: 50 INJECTION INTRAMUSCULAR; INTRAVENOUS at 11:13

## 2024-05-16 RX ADMIN — ONDANSETRON 4 MG: 2 INJECTION INTRAMUSCULAR; INTRAVENOUS at 10:42

## 2024-05-16 RX ADMIN — Medication 100 MCG: at 10:55

## 2024-05-16 NOTE — DISCHARGE INSTR - AVS FIRST PAGE
Post Operative Instructions    You have had surgery on your arm today, please read and follow the information below:  Elevate your hand above your elbow during the next 24-48 hours to help with swelling.  Place your hand and arm over your head with motion at your shoulder three times a day.  Do not apply any cream/ointment/oil to your incisions including antibiotics.  Do not soak your hands in standing water (dishwater, tubs, Jacuzzi's, pools, etc.) until given permission (typically 2-3 weeks after injury)    Call the office if you notice any:  Increased numbness or tingling of your hand or fingers that is not relieved with elevation.  Increasing pain that is not controlled with medication.  Difficulty chewing, breathing, swallowing.  Chest pains or shortness of breath.  Fever over 101.4 degrees.    Bandage: Do NOT remove bandage until follow-up appointment.    Motion: Move fingers into a fist 5 times a day, DO NOT move any splinted fingers.    Weight bearing status: The operated extremity should be non-weight bearing until further notice.    Ice: Ice for 10 minutes every hour as needed for swelling x 24 hours.    Sling: Please use your sling while your arm is numb from the block. When your arm is FULLY awake again, you no longer need this and may use your sling as needed for comfort. While using the sling, make sure to move your shoulder throughout the day to prevent stiffness here.     Medications:   Naproxen 220 mg two times a day   Tylenol Extended Release 650 mg every 8 hours  Tramadol 1 tab every 6 hours AS needed for pain    After surgery, we would like you to take naproxen 220 mg one tablet by mouth every 12 hours with food  AND Tylenol 650 mg one tablet by mouth every 8 hours  (at breakfast, lunch and dinner) for 3-5 days after your surgery.  Please take these medication EVERYDAY after surgery for 3-5 days, and not just as needed. You can take these medications at the same time.  Taking these medications  after surgery will limit your need for prescription pain medication.      We will also prescribe a narcotic pain medication for a limited time after surgery that you can take as needed for moderate or severe pain.      Follow-up Appointment: 7-10 days.      Please call the office if you have any questions or concerns regarding your post-operative care.

## 2024-05-16 NOTE — ANESTHESIA POSTPROCEDURE EVALUATION
Post-Op Assessment Note    CV Status:  Stable  Pain Score: 0    Pain management: adequate       Mental Status:  Alert and awake   Hydration Status:  Euvolemic   PONV Controlled:  Controlled   Airway Patency:  Patent     Post Op Vitals Reviewed: Yes    No anethesia notable event occurred.    Staff: Anesthesiologist, CRNA               BP   121/61   Temp   98   Pulse  96   Resp   16   SpO2   100

## 2024-05-16 NOTE — ANESTHESIA PREPROCEDURE EVALUATION
Procedure:  Right wrist arthroscopy with TFCC debridment verse repair (Right: Wrist)  Right de Quervain's release (Right: Hand)    Relevant Problems   No relevant active problems    Anxiety    Patient denies that she has vW disease after recent blood work with her hematologist    Physical Exam    Airway    Mallampati score: I  TM Distance: >3 FB  Neck ROM: full     Dental   Comment: None loose, No notable dental hx     Cardiovascular      Pulmonary      Other Findings  post-pubertal.      Anesthesia Plan  ASA Score- 2     Anesthesia Type- regional with ASA Monitors.         Additional Monitors:     Airway Plan:     Comment: NPO after MN  Urine hcg = negative    Patient educated on the possibility for awareness under sedation and of the possibility of airway intervention in the event of an airway or procedural emergency    Preop supraclavicular nerve block.       Plan Factors-Exercise tolerance (METS): >4 METS.    Chart reviewed.    Patient summary reviewed.    Patient is not a current smoker.              Induction- intravenous.    Postoperative Plan-         Informed Consent- Anesthetic plan and risks discussed with patient.  I personally reviewed this patient with the CRNA. Discussed and agreed on the Anesthesia Plan with the CRNA..

## 2024-05-16 NOTE — H&P
H&P Exam - Orthopedics   Juhi Tena 39 y.o. female MRN: [id number]  Unit/Bed#: [facility] 06    Assessment/Plan   Assessment:  Right wrist TFCC injury/tear  Right de Quervain's tendinitis    Plan:  Right wrist de Quervain's release and right wrist arthroscopy with TFCC debridement versus repair with general anesthesia    History of Present Illness   HPI:  Juhi Tena is a 39 y.o. female who presents with right wrist pain and discomfort.  She has tried conservative treatment without relief of her symptoms.  She would like to proceed with surgical intervention.    Historical Information  Review Of Systems:   Skin: Normal  Neuro: See HPI  Musculoskeletal: See HPI  14 point review of systems negative except as stated above     Past Medical History:   Past Medical History:   Diagnosis Date    Anemia     Anxiety     Chronic pain disorder     wrist right    COVID 2021    De Quervain's tenosynovitis, right     Hx of bleeding disorder     Von Willebrand disease (HCC)     Wears glasses        Past Surgical History:   Past Surgical History:   Procedure Laterality Date    NO PAST SURGERIES         Family History:  Family history reviewed and non-contributory  Family History   Problem Relation Age of Onset    No Known Problems Mother     No Known Problems Father        Social History:  Social History     Socioeconomic History    Marital status: /Civil Union     Spouse name: None    Number of children: None    Years of education: None    Highest education level: None   Occupational History    None   Tobacco Use    Smoking status: Never    Smokeless tobacco: Never   Vaping Use    Vaping status: Never Used   Substance and Sexual Activity    Alcohol use: Yes    Drug use: Never    Sexual activity: None   Other Topics Concern    None   Social History Narrative    None     Social Determinants of Health     Financial Resource Strain: Not on file   Food Insecurity: Not on file   Transportation Needs: Not on file   Physical  "Activity: Not on file   Stress: Not on file   Social Connections: Not on file   Intimate Partner Violence: Not on file   Housing Stability: Not on file       Allergies:   Allergies   Allergen Reactions    Shellfish-Derived Products - Food Allergy Anaphylaxis           Labs:  No results found for: \"HCT\", \"HGB\", \"PT\", \"INR\", \"WBC\", \"ESR\", \"CRP\"    Meds:    Current Facility-Administered Medications:     ceFAZolin (ANCEF) IVPB (premix in dextrose) 1,000 mg 50 mL, 1,000 mg, Intravenous, Once, Josue Barker PA-C    Blood Culture:   No results found for: \"BLOODCX\"    Wound Culture:   No results found for: \"WOUNDCULT\"    Ins and Outs:  No intake/output data recorded.            Physical Exam  /79   Pulse 97   Temp 98.2 °F (36.8 °C) (Oral)   Resp 16   Ht 5' 3\" (1.6 m)   Wt 64.4 kg (142 lb)   LMP 05/15/2024 (Exact Date)   SpO2 99%   BMI 25.15 kg/m²   /79   Pulse 97   Temp 98.2 °F (36.8 °C) (Oral)   Resp 16   Ht 5' 3\" (1.6 m)   Wt 64.4 kg (142 lb)   LMP 05/15/2024 (Exact Date)   SpO2 99%   BMI 25.15 kg/m²   Gen: No acute distress, resting comfortably in bed  HEENT: Eyes clear, moist mucus membranes, hearing intact  Respiratory: No audible wheezing or stridor  Cardiovascular: Well Perfused peripherally, 2+ distal pulse  Abdomen: nondistended, no peritoneal signs  Ortho Exam: De Quervain's Tenosynovitis Exam:  right side    Positive tender to palpation over 1st dorsal extensor compartment   Negative palpable nodule  Positive crepitus over 1st dorsal extensor compartment   Positive Finkelstein's test    Positive pain with resisted abduction of the thumb  No instability of the DRUJ, no instability of RC or MCP joint.  No ECU subluxation  Tenderness to palpation over the TFCC and prestyloid recess    Neuro Exam: Patient is neurovascularly intact from a median, radial and ulnar nerve distribution. Capillary refill less than 2 seconds.       Lab Results: Reviewed  Imaging: Reviewed    "

## 2024-05-16 NOTE — OP NOTE
OPERATIVE REPORT  PATIENT NAME: Juhi Tena  :  1985  MRN: [id number]  Pt Location:  MAIN OR    SURGERY DATE: 24    Surgeons and Role:     * Louie Barone MD - Primary     * Josue Barker PA-C - Assisting    Pre-Op Diagnosis:  Tenosynovitis, de Quervain [M65.4]  Injury of triangular fibrocartilage complex (TFCC) of right wrist, subsequent encounter [S69.81XD]    Post-Op Diagnosis:  .Tenosynovitis, de Quervain [M65.4]  Injury of triangular fibrocartilage complex (TFCC) of right wrist, subsequent encounter [S69.81XD]    Procedure(s) (LRB):  Right wrist arthroscopy with TFCC Repair and partial synovectomy (Right)  Right de Quervain's release (Right)  Application sugar tong splint (Right)  Excision ganglion cyst of the extensor tendon sheath first compartment    Specimen(s):  Order Name Source Comment Collection Info Order Time   TISSUE EXAM Joint, Right Wrist  Collected By: Louie Barone MD 2024 11:04 AM     Release to patient through Mychart   Immediate            Estimated Blood Loss:   Minimal      Anesthesia Type:   Regional with Sedation    Operative Indications:  The patient has a history of right wrist de Quervain's stenosing tenosynovitis and TFCC tear that was recalcitrant to conservative management.  The decision was made to bring the patient to the operating room for right wrist de Quervain's release, wrist arthroscopy, and TFCC debridement versus repair.  Risks of the procedure were explained which include, but are not limited to bleeding; infection; damage to nerves, arteries,veins, tendons; scar; pain; need for reoperation; failure to give desired result; and risks of anaesthesia.  All questions were answered to satisfaction and they were willing to proceed.         Operative Findings:  Right wrist de Quervain's stenosing tenosynovitis  Cyst right first dorsal extensor compartment  Synovitis right wrist  Small central scapholunate interosseous ligament tear  Chondral  injury ulnar aspect of the lunate partial thickness 2 x 2 mm  Peripheral TFCC tear right wrist      Complications:   None    Procedure and Technique:  After the patient, site, and procedure were identified, the patient was brought into the operating room in a supine position.  Regional anaesthesia and sedation were provided.  A well padded tourniquet was applied to the extremity, set at 250 mmHg.  The  right upper extremity was then prepped and drapped in a normal, sterile, orthopedic fashion.    After the patient, site, and procedure were once again identified, attention was turned to the right wrist.  A longitudinal incision was made over the first dorsal extensor compartment.  Dissection was carried out inline with the extensor tendons.  Care was taken to protect the superficial neurovascular structures including the branches of the superficial sensory branch of the radial nerve.  The tendon sheath was identified and was divided inline with the tendons under direct visualization in its entirety.  A ganglion cyst of the flexor tendon sheath was identified.  The ganglion cyst was circumferentially dissected and excised, sent for routine pathologic evaluation.  The cyst measured approximately 3 mm x 3 mm x 3 mm.  The extensor pollicis brevis was inspected to ensure complete release from any subcompartment.  The abductor pollicis longus was also evaluated.  The thumb was brought through a full range of motion to ensure complete release without any binding, catching, popping, subluxation, clicking, or locking.  A loose stay suture was placed within the leaflets of the extensor tendon sheath to prevent tendon subluxation.      After identification of the patient, site, and procedure once again, the arm was placed within an ARC arthroscopy tower and longitudinal traction was applied.  The joint was insufflated with normal saline solution utilizing an 18-gauge needle with normal saline.  Utilizing a nick and spread  technique, a 3-4 and a 4-5 working portal were then made.  Diagnostic arthroscopy was then completed.  This demonstrated no evidence of loose or intra-articular bodies on the radial side, volar radial carpal ligaments and ulnar carpal ligaments were intact.  Articular surface on the undersurface of the radius were intact and on the lunate was intact.  Partial scapholunate interosseous ligament tear along the membranous portion was identified and was debrided.  Synovitis was noted along the dorsal radial, dorsal ulnar, and ulnar aspect of the wrist which was also debrided with a Gator shaver.  There was a small cartilaginous injury to the ulnar aspect of the lunate which was debrided back to stable edges.  Central TFCC was intact, radial insertion was intact.  There was significant dorsal capsular infolding and redundancy to the dorsal side of the wrist which was also debrided back.  A peripheral tear of the TFCC was noted.  This was debrided back to allow for bleeding edges.  A separate incision was made along the ulnar aspect of the wrist for peripheral TFCC repair.  Utilizing the TFCC mending kit, 2-0 PDS horizontal mattress suture was placed securing the peripheral end of the TFCC down to the capsular layer.  This completed our repair.  We were then satisfied with the overall repair, review of the wrist demonstrated no other evidence of injury.  Arthroscope was removed and tourniquet was deflated.    At the completion of the procedure, hemostasis was obtained with cautery and direct pressure.  The wounds were copiously irrigated with sterile solution.  The wounds were closed with Vicryl and Prolene.  Sterile dressings were applied, including Xeroform, gauze, tweeners, webril, ACE and Sugartong Splint.  Please note, all sponge, needle, and instrument counts were correct prior to closure.  Loupe magnification was utilized.  The patient tolerated the procedure well.     I was present for all critical portions of the  procedure., A qualified resident physician was not available., and A physician assistant was required during the procedure for retraction, tissue handling, dissection and suturing.    Patient Disposition:  PACU  and hemodynamically stable    SIGNATURE: Louie Barone MD  DATE: 05/16/24  TIME: 11:35 AM

## 2024-05-16 NOTE — ANESTHESIA PROCEDURE NOTES
Peripheral Block    Patient location during procedure: holding area  Start time: 5/16/2024 9:35 AM  Reason for block: at surgeon's request and post-op pain management  Staffing  Performed by: Faviola Valiente MD  Authorized by: Faviola Valiente MD    Preanesthetic Checklist  Completed: patient identified, IV checked, site marked, risks and benefits discussed, surgical consent, monitors and equipment checked, pre-op evaluation and timeout performed  Peripheral Block  Patient position: supine  Prep: ChloraPrep  Patient monitoring: heart rate and continuous pulse oximetry  Block type: Supraclavicular  Laterality: right  Injection technique: single-shot  Procedures: ultrasound guided, Ultrasound guidance required for the procedure to increase accuracy and safety of medication placement and decrease risk of complications.  Ultrasound permanent image saved    Needle  Needle type: Stimuplex   Needle gauge: 20 G  Needle length: 2 in  Needle localization: ultrasound guidance  Needle insertion depth: 2 cm  Assessment  Injection assessment: frequent aspiration, incremental injection, needle tip visualized at all times, injected with ease, negative aspiration, no paresthesia on injection, no symptoms of intraneural/intravenous injection and negative for heart rate change  Paresthesia pain: none  Post-procedure:  site cleaned  patient tolerated the procedure well with no immediate complications

## 2024-05-17 ENCOUNTER — TELEPHONE (OUTPATIENT)
Dept: OBGYN CLINIC | Facility: HOSPITAL | Age: 39
End: 2024-05-17

## 2024-05-17 NOTE — TELEPHONE ENCOUNTER
Hello,  Please advise if the following patient can be forced onto the schedule:    Patient: Juhi    Call back #: [phone]    Reason for appointment: [facility] Appointment, she is scheduled with Josue Barker on 05/24/2024. Patient does not have transportation to [place], she see's physician in [place]. She is asking to be seen in [place] for her PO she stated she can walk there. Everyone she is trying to get a ride from to [place] is working.     Requested doctor and/or location: Abisai/ [place]      Thank you.

## 2024-05-21 PROCEDURE — 88304 TISSUE EXAM BY PATHOLOGIST: CPT | Performed by: PATHOLOGY

## 2024-05-21 NOTE — TELEPHONE ENCOUNTER
Called patient and explained that our office is closed on Monday 5/27/24 for the Holiday, and Friday is the only day we can see her for Suture removal. Patient expressed understanding and stated that she will keep her appointment in the [place] office Friday 5/24/24

## 2024-05-24 ENCOUNTER — OFFICE VISIT (OUTPATIENT)
Dept: OBGYN CLINIC | Facility: HOSPITAL | Age: 39
End: 2024-05-24

## 2024-05-24 ENCOUNTER — TELEPHONE (OUTPATIENT)
Dept: OBGYN CLINIC | Facility: HOSPITAL | Age: 39
End: 2024-05-24

## 2024-05-24 VITALS — WEIGHT: 142 LBS | BODY MASS INDEX: 25.16 KG/M2 | HEIGHT: 63 IN

## 2024-05-24 DIAGNOSIS — M65.4 TENOSYNOVITIS, DE QUERVAIN: ICD-10-CM

## 2024-05-24 DIAGNOSIS — S69.81XD INJURY OF TRIANGULAR FIBROCARTILAGE COMPLEX (TFCC) OF RIGHT WRIST, SUBSEQUENT ENCOUNTER: Primary | ICD-10-CM

## 2024-05-24 RX ORDER — SENNOSIDES 8.6 MG
650 CAPSULE ORAL EVERY 8 HOURS PRN
Qty: 30 TABLET | Refills: 0 | Status: SHIPPED | OUTPATIENT
Start: 2024-05-24

## 2024-05-24 NOTE — TELEPHONE ENCOUNTER
Patient needs a 5wk f/up with Dr. Barone. Patient needs the [facility] location please. There were no f/up appointments available.     Can you help with an appointment?    Thank you

## 2024-05-24 NOTE — PROGRESS NOTES
"Assessment:   S/P Right wrist arthroscopy with TFCC Repair and partial synovectomy - Right, Right de Quervain's release - Right, Application sugar tong splint - Right, and Excision ganglion cyst of the extensor tendon sheath first compartment on 5/16/2024    Plan:   Patient was placed into a [place] cast today.  She tolerated well.  Cast precautions were reviewed  She was encouraged to work on making a full composite fist  She will follow-up in 5 weeks for reevaluation    Follow Up:  5 weeks    To Do Next Visit:  Reevaluation      CHIEF COMPLAINT:  Chief Complaint   Patient presents with    Left Wrist - Post-op, Suture / Staple Removal     De' Quervain's release & TFCC repair and partial synovectomy- 5/16/27         SUBJECTIVE:  Juhi Tena is a 39 y.o. female who presents for follow up after Right wrist arthroscopy with TFCC Repair and partial synovectomy - Right, Right de Quervain's release - Right, Application sugar tong splint - Right, and Excision ganglion cyst of the extensor tendon sheath first compartment on 5/16/2024.  Today patient has some pain and discomfort in her wrist.  She kept the splint clean and dry.       PHYSICAL EXAMINATION:  Vital signs: Ht 5' 3\" (1.6 m)   Wt 64.4 kg (142 lb)   LMP 05/15/2024 (Exact Date)   BMI 25.15 kg/m²   General: well developed and well nourished, alert, oriented times 3, and appears comfortable  Psychiatric: Normal    MUSCULOSKELETAL EXAMINATION:  Incision: Clean, dry, intact  Range of Motion: As expected and Limited due to stiffness  Neurovascular status: Neuro intact, good cap refill  Activity Restrictions: Cast/splint restrictions  Done today: Sutures out      STUDIES REVIEWED:  Intraoperative films were reviewed with the patient      PROCEDURES PERFORMED:  Procedures  No Procedures performed today    "

## 2024-06-22 DIAGNOSIS — M65.4 TENOSYNOVITIS, DE QUERVAIN: ICD-10-CM

## 2024-06-22 DIAGNOSIS — S69.81XD INJURY OF TRIANGULAR FIBROCARTILAGE COMPLEX (TFCC) OF RIGHT WRIST, SUBSEQUENT ENCOUNTER: ICD-10-CM

## 2024-06-24 RX ORDER — NAPROXEN SODIUM 220 MG
CAPSULE ORAL
Qty: 60 CAPSULE | Refills: 0 | Status: SHIPPED | OUTPATIENT
Start: 2024-06-24

## 2024-07-01 ENCOUNTER — OFFICE VISIT (OUTPATIENT)
Dept: OBGYN CLINIC | Facility: CLINIC | Age: 39
End: 2024-07-01

## 2024-07-01 VITALS — HEIGHT: 63 IN | BODY MASS INDEX: 25.16 KG/M2 | WEIGHT: 142 LBS

## 2024-07-01 DIAGNOSIS — S69.81XD INJURY OF TRIANGULAR FIBROCARTILAGE COMPLEX (TFCC) OF RIGHT WRIST, SUBSEQUENT ENCOUNTER: Primary | ICD-10-CM

## 2024-07-01 PROCEDURE — 99024 POSTOP FOLLOW-UP VISIT: CPT | Performed by: PHYSICIAN ASSISTANT

## 2024-07-01 RX ORDER — SERTRALINE HYDROCHLORIDE 25 MG/1
25 TABLET, FILM COATED ORAL DAILY
COMMUNITY
Start: 2024-06-03

## 2024-07-02 NOTE — PROGRESS NOTES
"Assessment:   S/P Right wrist arthroscopy with TFCC Repair and partial synovectomy - Right, Right de Quervain's release - Right, Application sugar tong splint - Right, and Excision ganglion cyst of the extensor tendon sheath first compartment on 5/16/2024    Plan:   It was discussed with the patient that we will place her into a cock up wrist brace  She is to wear this at all times except for therapy and for hygiene purposes  She was provided a therapy prescription to begin to work on range of motion  She will follow-up in 6 weeks for reevaluation    Follow Up:  6  week(s)    To Do Next Visit:  Reevaluation      CHIEF COMPLAINT:  Chief Complaint   Patient presents with    Left Wrist - Post-op     De' Quervain's release & TFCC repair and partial synovectomy- 5/16/27         SUBJECTIVE:  Juhi Tena is a 39 y.o. female who presents for follow up after Right wrist arthroscopy with TFCC Repair and partial synovectomy - Right, Right de Quervain's release - Right, Application sugar tong splint - Right, and Excision ganglion cyst of the extensor tendon sheath first compartment on 5/16/2024.  Today patient has some discomfort in her wrist as the cast was removed.  She states that she felt nauseous after the cast was removed.       PHYSICAL EXAMINATION:  Vital signs: Ht 5' 3\" (1.6 m)   Wt 64.4 kg (142 lb)   BMI 25.15 kg/m²   General: alert, oriented times 3, and appears comfortable  Psychiatric: Normal    MUSCULOSKELETAL EXAMINATION:  Incision: clean, dry, and healed  Range of Motion: As expected and Limited due to stiffness  Neurovascular status: Neuro intact, good cap refill  Activity Restrictions: Cast/splint restrictions         STUDIES REVIEWED:  No Studies to review      PROCEDURES PERFORMED:  Procedures  No Procedures performed today    "

## 2024-07-15 ENCOUNTER — EVALUATION (OUTPATIENT)
Dept: OCCUPATIONAL THERAPY | Facility: CLINIC | Age: 39
End: 2024-07-15
Payer: OTHER MISCELLANEOUS

## 2024-07-15 DIAGNOSIS — S69.81XD INJURY OF TRIANGULAR FIBROCARTILAGE COMPLEX (TFCC) OF RIGHT WRIST, SUBSEQUENT ENCOUNTER: ICD-10-CM

## 2024-07-15 PROCEDURE — 97140 MANUAL THERAPY 1/> REGIONS: CPT | Performed by: OCCUPATIONAL THERAPIST

## 2024-07-15 PROCEDURE — 97165 OT EVAL LOW COMPLEX 30 MIN: CPT | Performed by: OCCUPATIONAL THERAPIST

## 2024-07-15 PROCEDURE — 97110 THERAPEUTIC EXERCISES: CPT | Performed by: OCCUPATIONAL THERAPIST

## 2024-07-15 NOTE — PROGRESS NOTES
OT Evaluation     Today's date: 7/15/2024  Patient name: Juhi Tena  : 1985  MRN: [id number]  Referring provider: Josue Barker PA-C  Dx:   Encounter Diagnosis     ICD-10-CM    1. Injury of triangular fibrocartilage complex (TFCC) of right wrist, subsequent encounter  S69.81XD Ambulatory Referral to PT/OT Hand Therapy                     Assessment  Impairments: abnormal or restricted ROM, lacks appropriate home exercise program and pain with function  Functional limitations: Pt. has limited ADLs due to non use of R hand.  Symptom irritability: moderate    Assessment details: Pt. Presents today for evaluation of the R wrist s/p surgery.  Pt. Has moderated edema and pain with hand use.  She is limited with her R wrist ROM and mobility.  She is unable to lift with her R hand with ADLs.  Pt. Is wearing a wrist support at all times.  Pt. Is out of work currently.  Pt. To benefit from skilled hand therapy to improve her ROM and progress to strength in order to return to work and baseline function.  Understanding of Dx/Px/POC: good     Prognosis: good    Goals  STG( 6 visits)  1. Compliant with HEP/splint  2. Reduce pain to less than 8/10 with ROM  3.. Increase R wrist ROM by 10 degrees for improved function  LTG( 12 visits or discharge)  1 R wrist AROM WNLs all planes for function  2. R /pinch strength WFLs for RTW  3. Improve FOTO score to predicted outcome or greeater.    Plan  Patient would benefit from: skilled occupational therapy  Planned modality interventions: cryotherapy and thermotherapy: hydrocollator packs    Planned therapy interventions: IASTM, joint mobilization, manual therapy, home exercise program, graded exercise, therapeutic exercise, therapeutic activities, functional ROM exercises, fine motor coordination training and orthotic fitting/training    Frequency: 2x week  Duration in weeks: 6  Plan of Care beginning date: 7/15/2024  Plan of Care expiration date: 2024  Treatment  plan discussed with: patient        Subjective Evaluation    History of Present Illness  Mechanism of injury: surgery  Mechanism of injury: S/P Right wrist arthroscopy with TFCC Repair and partial synovectomy - Right, Right de Quervain's release - Right, Application sugar tong splint - Right, and Excision ganglion cyst of the extensor tendon sheath first compartment on 2024  Quality of life: good    Patient Goals  Patient goals for therapy: decreased pain, increased motion, return to work and increased strength    Pain  Current pain ratin  At best pain ratin  At worst pain ratin  Quality: tight  Aggravating factors: lifting and nothing (ROM)    Social Support  Lives with: spouse    Employment status: working ([employer])  Hand dominance: ambidextrous    Treatments  Current treatment: occupational therapy        Objective     Tenderness     Right Wrist/Hand   Tenderness in the first dorsal compartment.     Additional Tenderness Details  TTP over incision    Neurological Testing     Sensation     Wrist/Hand     Right   Intact: light touch    Active Range of Motion     Left Elbow   Forearm supination: 50 degrees   Forearm pronation: 55 degrees     Right Wrist   Wrist flexion: 15 degrees with pain  Wrist extension: 40 degrees with pain  Radial deviation: 15 degrees with pain  Ulnar deviation: 15 degrees with pain    Right Thumb   Opposition: Full oppositon    Additional Active Range of Motion Details  Loose composite fist  + extrinsic flexor tightness.    Strength/Myotome Testing     Left Wrist/Hand      (2nd hand position)     Trial 1: 30    Thumb Strength  Key/Lateral Pinch     Trial 1: 12  Tip/Two-Point Pinch     Trial 1: 8  Palmar/Three-Point Pinch     Trial 1: 15    Right Wrist/Hand   Wrist extension: 3-  Wrist flexion: 3-  Radial deviation: 3-  Ulnar deviation: 3-     (2nd hand position)     Trial 1: 0    Thumb Strength   Key/Lateral Pinch     Trial 1: 0  Tip/Two-Point Pinch     Trial 1:  0  Palmar/Three-Point Pinch     Trial 1: 0    Swelling     Left Wrist/Hand   Circumference wrist: 14.3 cm    Right Wrist/Hand   Circumference wrist: 15 cm             Precautions: ROM only, Sx 5/16/24      Manuals 7/15             IE 30            Scar mob 3m            retrograde 3m            Graston R 4m            Neuro Re-Ed                          Wrist A/AAROM, TGEs Reviewed                                                                             Ther Ex             Wrist A/AAROM 2x10            P/S A/AAROM Dowel 2x10            Foam roll stretch flex/ext             Wrist maze                                                                 Ther Activity             Peg                           Gait Training                                       Modalities              R 8m            CP post 5m

## 2024-07-19 ENCOUNTER — OFFICE VISIT (OUTPATIENT)
Dept: OCCUPATIONAL THERAPY | Facility: CLINIC | Age: 39
End: 2024-07-19
Payer: COMMERCIAL

## 2024-07-19 DIAGNOSIS — S69.81XD INJURY OF TRIANGULAR FIBROCARTILAGE COMPLEX (TFCC) OF RIGHT WRIST, SUBSEQUENT ENCOUNTER: Primary | ICD-10-CM

## 2024-07-19 PROCEDURE — 97110 THERAPEUTIC EXERCISES: CPT | Performed by: OCCUPATIONAL THERAPIST

## 2024-07-19 PROCEDURE — 97140 MANUAL THERAPY 1/> REGIONS: CPT | Performed by: OCCUPATIONAL THERAPIST

## 2024-07-19 NOTE — PROGRESS NOTES
Daily Note     Today's date: 2024  Patient name: Juhi Tena  : 1985  MRN: [id number]  Referring provider: Josue Barker PA-C  Dx:   Encounter Diagnosis     ICD-10-CM    1. Injury of triangular fibrocartilage complex (TFCC) of right wrist, subsequent encounter  S69.81XD                      Subjective: I have been having a lot of cracking in my wrist.      Objective: See treatment diary below      Assessment: Tolerated treatment well. Patient  continues with limited wrist mobility and pain with ROM.  She is tender over the incision sites.        Plan: Continue per plan of care.      Precautions: ROM only, Sx 24      Manuals 7/15 7/19            IE 30            Scar mob 3m 3m           retrograde 3m 3m           Graston R 4m 4m           Neuro Re-Ed                          Wrist A/AAROMAline Reviewed                                                                             Ther Ex             Wrist A/AAROM 2x10 Cone  2x10           P/S A/AAROM Dowel 2x10 Dowel 2x10           Foam roll stretch flex/ext  x15                                                                            Ther Activity             Peg  w. supination  x30                        Gait Training                                       Modalities             MH R 8m 8m           CP post 5m 5m

## 2024-07-22 ENCOUNTER — OFFICE VISIT (OUTPATIENT)
Dept: OCCUPATIONAL THERAPY | Facility: CLINIC | Age: 39
End: 2024-07-22
Payer: OTHER MISCELLANEOUS

## 2024-07-22 DIAGNOSIS — S69.81XD INJURY OF TRIANGULAR FIBROCARTILAGE COMPLEX (TFCC) OF RIGHT WRIST, SUBSEQUENT ENCOUNTER: Primary | ICD-10-CM

## 2024-07-22 PROCEDURE — 97530 THERAPEUTIC ACTIVITIES: CPT

## 2024-07-22 PROCEDURE — 97010 HOT OR COLD PACKS THERAPY: CPT

## 2024-07-22 PROCEDURE — 97110 THERAPEUTIC EXERCISES: CPT

## 2024-07-22 PROCEDURE — 97140 MANUAL THERAPY 1/> REGIONS: CPT

## 2024-07-22 NOTE — PROGRESS NOTES
Daily Note     Today's date: 2024  Patient name: Juhi Tena  : 1985  MRN: [id number]  Referring provider: Josue Barker PA-C  Dx:   Encounter Diagnosis     ICD-10-CM    1. Injury of triangular fibrocartilage complex (TFCC) of right wrist, subsequent encounter  S69.81XD                      Subjective: I feel like I'm moving better in flexion, but not in extension as much. I think I over did it with my home program. When I deviate my wrist (UD) sometimes I feel a pop and it hurts, but it doesn't happen all the time      Objective: See treatment diary below  AROM wrist flexion = 50 ( gravity eliminated) ( + 35)  AROM wrist extension = 45 (+5)  AROM wrist RD = 15 (SQ)  Arom WRIST UD = 28 (+13)    Assessment: Tolerated treatment well. Patient exhibited good technique with therapeutic exercises. No popping observed during AROM. Improved AROM of the wrist.      Plan: Continue per plan of care.      Precautions: ROM only, Sx 24      Manuals 7/15 7/19 7/22           IE 30            Scar mob 3m 3m 5'          retrograde 3m 3m 4'          Graston R 4m 4m 3'          Neuro Re-Ed                          Wrist A/AAROM, TGEs Reviewed                                                                             Ther Ex             Wrist A/AAROM 2x10 Cone  2x10 Cone 2x 10 all planes          P/S A/AAROM Dowel 2x10 Dowel 2x10 Dowel 2 x 10          Foam roll stretch flex/ext  x15 2x10          Finklestein stretch   x10                                                              Ther Activity             Peg  w. supination  x30 x40                       Gait Training                                       Modalities             MH R 8m 8m 8'          CP post 5m 5m 5'

## 2024-07-24 ENCOUNTER — OFFICE VISIT (OUTPATIENT)
Dept: OCCUPATIONAL THERAPY | Facility: CLINIC | Age: 39
End: 2024-07-24
Payer: OTHER MISCELLANEOUS

## 2024-07-24 DIAGNOSIS — S69.81XD INJURY OF TRIANGULAR FIBROCARTILAGE COMPLEX (TFCC) OF RIGHT WRIST, SUBSEQUENT ENCOUNTER: Primary | ICD-10-CM

## 2024-07-24 PROCEDURE — 97140 MANUAL THERAPY 1/> REGIONS: CPT | Performed by: OCCUPATIONAL THERAPIST

## 2024-07-24 PROCEDURE — 97110 THERAPEUTIC EXERCISES: CPT | Performed by: OCCUPATIONAL THERAPIST

## 2024-07-24 NOTE — PROGRESS NOTES
Daily Note     Today's date: 2024  Patient name: Juhi Tena  : 1985  MRN: [id number]  Referring provider: Josue Barker PA-C  Dx:   Encounter Diagnosis     ICD-10-CM    1. Injury of triangular fibrocartilage complex (TFCC) of right wrist, subsequent encounter  S69.81XD                      Subjective: I think my flexion is much better.  I was having pain from over doing my home exercises.      Objective: See treatment diary below  AROM wrist flexion = 50 ( gravity eliminated) ( + 35)  AROM wrist extension = 45 (+5)  AROM wrist RD = 15 (SQ)  Arom WRIST UD = 28 (+13)    Assessment: Tolerated treatment well. Patient motivated with tx.  Making steady progress with her AROM in pain free range.      Plan: Continue per plan of care.      Precautions: ROM only, Sx 24      Manuals 7/15 7/19 7/22 7/24          IE 30            Scar mob 3m 3m 5' 3m         retrograde 3m 3m 4' 3m         Graston R 4m 4m 3' 4m         Neuro Re-Ed                          Wrist A/AAROMAline Reviewed                                                                             Ther Ex             Wrist A/AAROM 2x10 Cone  2x10 Cone 2x 10 all planes Cone 3x10         P/S A/AAROM Dowel 2x10 Dowel 2x10 Dowel 2 x 10 Dowel 3x10         Foam roll stretch flex/ext  x15 2x10 2x10         Finklestein stretch   x10 x10         Wrist circles    Marbles 2m                                                Ther Activity             Peg  w. supination  x30 x40 x30                      Gait Training                                       Modalities             MH R 8m 8m 8' 8m         CP post 5m 5m 5' 5m

## 2024-07-30 ENCOUNTER — OFFICE VISIT (OUTPATIENT)
Dept: OCCUPATIONAL THERAPY | Facility: CLINIC | Age: 39
End: 2024-07-30
Payer: OTHER MISCELLANEOUS

## 2024-07-30 DIAGNOSIS — S69.81XD INJURY OF TRIANGULAR FIBROCARTILAGE COMPLEX (TFCC) OF RIGHT WRIST, SUBSEQUENT ENCOUNTER: Primary | ICD-10-CM

## 2024-07-30 PROCEDURE — 97140 MANUAL THERAPY 1/> REGIONS: CPT | Performed by: OCCUPATIONAL THERAPIST

## 2024-07-30 PROCEDURE — 97110 THERAPEUTIC EXERCISES: CPT | Performed by: OCCUPATIONAL THERAPIST

## 2024-07-30 NOTE — PROGRESS NOTES
"Daily Note     Today's date: 2024  Patient name: Juhi Tena  : 1985  MRN: [id number]  Referring provider: Josue Barker PA-C  Dx:   Encounter Diagnosis     ICD-10-CM    1. Injury of triangular fibrocartilage complex (TFCC) of right wrist, subsequent encounter  S69.81XD                      Subjective: I have pain all the time on my pinky side of my wrist      Objective: See treatment diary below  AROM wrist flexion = 50 ( gravity eliminated) ( + 35)  AROM wrist extension = 45 (+5)  AROM wrist RD = 15 (SQ)  Arom WRIST UD = 28 (+13)    Assessment: Tolerated treatment well. Patient motivated with tx; but c/o discomfort to TFCC side and reports feeling \"cyst\" like structure that moves.  C/O pain with graston tools; deferred use today 2* pain.      Plan: Continue per plan of care.      Precautions: ROM only, Sx 24      Manuals 7/15 7/19 7/22 7/24 7/30         IE 30            Scar mob 3m 3m 5' 3m 3m        retrograde 3m 3m 4' 3m 3m        Graston R 4m 4m 3' 4m 4m        Neuro Re-Ed                          Wrist A/AAROM, TGEs Reviewed                                                                             Ther Ex             Wrist A/AAROM 2x10 Cone  2x10 Cone 2x 10 all planes Cone 3x10 Cone 3x10        P/S A/AAROM Dowel 2x10 Dowel 2x10 Dowel 2 x 10 Dowel 3x10 Dowel 3x10        Foam roll stretch flex/ext  x15 2x10 2x10 2x10        Finklestein stretch   x10 x10 x10        Wrist circles    Marbles 2m Marbles 2m                                                Ther Activity             Peg  w. supination  x30 x40 x30 x30                     Gait Training                                       Modalities             MH R 8m 8m 8' 8m 5m        CP post 5m 5m 5' 5m                "

## 2024-07-31 ENCOUNTER — APPOINTMENT (OUTPATIENT)
Dept: OCCUPATIONAL THERAPY | Facility: CLINIC | Age: 39
End: 2024-07-31
Payer: OTHER MISCELLANEOUS

## 2024-08-02 ENCOUNTER — APPOINTMENT (OUTPATIENT)
Dept: OCCUPATIONAL THERAPY | Facility: CLINIC | Age: 39
End: 2024-08-02
Payer: OTHER MISCELLANEOUS

## 2024-08-05 ENCOUNTER — OFFICE VISIT (OUTPATIENT)
Dept: OCCUPATIONAL THERAPY | Facility: CLINIC | Age: 39
End: 2024-08-05
Payer: OTHER MISCELLANEOUS

## 2024-08-05 DIAGNOSIS — S69.81XD INJURY OF TRIANGULAR FIBROCARTILAGE COMPLEX (TFCC) OF RIGHT WRIST, SUBSEQUENT ENCOUNTER: Primary | ICD-10-CM

## 2024-08-05 PROCEDURE — 97140 MANUAL THERAPY 1/> REGIONS: CPT | Performed by: OCCUPATIONAL THERAPIST

## 2024-08-05 PROCEDURE — 97110 THERAPEUTIC EXERCISES: CPT | Performed by: OCCUPATIONAL THERAPIST

## 2024-08-05 NOTE — PROGRESS NOTES
Daily Note     Today's date: 2024  Patient name: Juhi Tena  : 1985  MRN: [id number]  Referring provider: Josue Barker PA-C  Dx:   Encounter Diagnosis     ICD-10-CM    1. 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 I do have bump that gives me sharp pain (ulnar wrist )      Objective: See treatment diary below      Assessment: Tolerated treatment fair. Patient  has trigger points in ext mass with ext tightness with wrist flexion while making a fist .  Added tubigrip for edema     Plan: Continue per plan of care.      Precautions: ROM only, Sx 24      Manuals 7/15 7/19 7/22 7/24 7/30 8       TPR ext mass IE 30     2m       Scar mob 3m 3m 5' 3m 3m 3m       retrograde 3m 3m 4' 3m 3m 3m       Graston R 4m 4m 3' 4m 4m 4m       Neuro Re-Ed                          Wrist A/AAROM, TGEs Reviewed                                                                             Ther Ex             Wrist A/AAROM 2x10 Cone  2x10 Cone 2x 10 all planes Cone 3x10 Cone 3x10 Cone 3x10       P/S A/AAROM Dowel 2x10 Dowel 2x10 Dowel 2 x 10 Dowel 3x10 Dowel 3x10 Dowel  3x10       Foam roll stretch flex/ext  x15 2x10 2x10 2x10 2x10       Finklestein stretch   x10 x10 x10 x10       Wrist circles    Marbles 2m Marbles 2m                                                Ther Activity             Peg  w. supination  x30 x40 x30 x30 X30                     Gait Training                                       Modalities             MH R 8m 8m 8' 8m 5m 6m       CP post 5m 5m 5' 5m  5m

## 2024-08-08 ENCOUNTER — OFFICE VISIT (OUTPATIENT)
Dept: OCCUPATIONAL THERAPY | Facility: CLINIC | Age: 39
End: 2024-08-08
Payer: OTHER MISCELLANEOUS

## 2024-08-08 DIAGNOSIS — S69.81XD INJURY OF TRIANGULAR FIBROCARTILAGE COMPLEX (TFCC) OF RIGHT WRIST, SUBSEQUENT ENCOUNTER: Primary | ICD-10-CM

## 2024-08-08 PROCEDURE — 97110 THERAPEUTIC EXERCISES: CPT | Performed by: OCCUPATIONAL THERAPIST

## 2024-08-08 PROCEDURE — 97140 MANUAL THERAPY 1/> REGIONS: CPT | Performed by: OCCUPATIONAL THERAPIST

## 2024-08-08 NOTE — PROGRESS NOTES
Daily Note     Today's date: 2024  Patient name: Juhi Tena  : 1985  MRN: [id number]  Referring provider: Josue Barker PA-C  Dx:   Encounter Diagnosis     ICD-10-CM    1. 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 Worst pain 4/10      Objective: See treatment diary below      Assessment: Tolerated treatment well. Patient  has improved ROM . She has continued trigger points in her forearm,    Wrist e/f =55/52  P/S =78/80    Plan: Continue per plan of care.      Precautions: ROM only, Sx 24  F/u with MD 24    Manuals 7/15 7/19 7/22 7/24 7/30 8/5 8/8      TPR ext mass IE 30     2m 2m      Scar mob 3m 3m 5' 3m 3m 3m 3m      retrograde 3m 3m 4' 3m 3m 3m 3m      Graston R 4m 4m 3' 4m 4m 4m 4m      Neuro Re-Ed                          Wrist A/AAROM, TGEs Reviewed                                                                             Ther Ex             Wrist A/AAROM 2x10 Cone  2x10 Cone 2x 10 all planes Cone 3x10 Cone 3x10 Cone 3x10 Cone   3x10      P/S A/AAROM Dowel 2x10 Dowel 2x10 Dowel 2 x 10 Dowel 3x10 Dowel 3x10 Dowel  3x10 Dowel  3x10      Foam roll stretch flex/ext  x15 2x10 2x10 2x10 2x10 2x10      Finklestein stretch   x10 x10 x10 x10 x10      Wrist circles    Marbles 2m Marbles 2m                                                Ther Activity             Peg  w. supination  x30 x40 x30 x30 X30  X 30                   Gait Training                                       Modalities             MH R 8m 8m 8' 8m 5m 6m 6m      CP post 5m 5m 5' 5m  5m 5m

## 2024-08-12 ENCOUNTER — OFFICE VISIT (OUTPATIENT)
Dept: OCCUPATIONAL THERAPY | Facility: CLINIC | Age: 39
End: 2024-08-12
Payer: OTHER MISCELLANEOUS

## 2024-08-12 ENCOUNTER — OFFICE VISIT (OUTPATIENT)
Dept: OBGYN CLINIC | Facility: CLINIC | Age: 39
End: 2024-08-12

## 2024-08-12 VITALS — HEIGHT: 63 IN | BODY MASS INDEX: 25.16 KG/M2 | WEIGHT: 142 LBS

## 2024-08-12 DIAGNOSIS — M66.241 NONTRAUMATIC SAGITTAL BAND RUPTURE OF EXTENSOR TENDON, RIGHT: ICD-10-CM

## 2024-08-12 DIAGNOSIS — S69.81XD INJURY OF TRIANGULAR FIBROCARTILAGE COMPLEX (TFCC) OF RIGHT WRIST, SUBSEQUENT ENCOUNTER: Primary | ICD-10-CM

## 2024-08-12 DIAGNOSIS — Z09 POSTOP CHECK: Primary | ICD-10-CM

## 2024-08-12 PROCEDURE — 99024 POSTOP FOLLOW-UP VISIT: CPT | Performed by: ORTHOPAEDIC SURGERY

## 2024-08-12 PROCEDURE — 97140 MANUAL THERAPY 1/> REGIONS: CPT | Performed by: OCCUPATIONAL THERAPIST

## 2024-08-12 PROCEDURE — 97110 THERAPEUTIC EXERCISES: CPT | Performed by: OCCUPATIONAL THERAPIST

## 2024-08-12 NOTE — PROGRESS NOTES
Daily Note     Today's date: 2024  Patient name: Juhi Tena  : 1985  MRN: [id number]  Referring provider: Josue Barker PA-C  Dx:   Encounter Diagnosis     ICD-10-CM    1. 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 Patient  remains  guarded .  She has improved wrist ROM .  Continued pain at end range. HEP for wrist ROM reinforced      Plan: Continue per plan of care.      Precautions: ROM only, Sx 24  F/u with MD 24    Manuals 7/15 7/19 7/22 7/24 7/30 8/5 8/8 8/12     TPR ext mass IE 30     2m 2m 2m     Scar mob 3m 3m 5' 3m 3m 3m 3m 3m     retrograde 3m 3m 4' 3m 3m 3m 3m 3m     Graston R 4m 4m 3' 4m 4m 4m 4m 4m     Neuro Re-Ed                          Wrist A/AAROM, TGEs Reviewed                                                                             Ther Ex             Wrist A/AAROM 2x10 Cone  2x10 Cone 2x 10 all planes Cone 3x10 Cone 3x10 Cone 3x10 Cone   3x10 Cone  3x10     P/S A/AAROM Dowel 2x10 Dowel 2x10 Dowel 2 x 10 Dowel 3x10 Dowel 3x10 Dowel  3x10 Dowel  3x10 Dowel  3x10     Foam roll stretch flex/ext  x15 2x10 2x10 2x10 2x10 2x10      Finklestein stretch   x10 x10 x10 x10 x10 x10     Wrist circles    Marbles 2m Marbles 2m                                                Ther Activity             Peg  w. supination  x30 x40 x30 x30 X30  X 30 x30                  Gait Training                                       Modalities             MH R 8m 8m 8' 8m 5m 6m 6m 6m     CP post 5m 5m 5' 5m  5m 5m 5m

## 2024-08-12 NOTE — PROGRESS NOTES
"Assessment:   S/P Right wrist arthroscopy with TFCC Repair and partial synovectomy - Right, Right de Quervain's release - Right, Application sugar tong splint - Right, and Excision ganglion cyst of the extensor tendon sheath first compartment on 5/16/2024    Right ring finger sagittal band insufficiency    Plan:   Continue weight bearing to tolerance, continue ROM to tolerance  She is having benefit from hand therapy and should continue  She can discontinue the wrist brace at this time  Her ulnar sided right ring finger sagittal band insufficiency is asymptomatic a tthis time    Follow Up:  3 months for motion recheck    To Do Next Visit:  Recheck motion      CHIEF COMPLAINT:  Chief Complaint   Patient presents with    Right Wrist - Follow-up     TFCC- Radiocarpal injection- 11/20/23  De Quervain's tenosynovitis- 1st injection 11/20/23         SUBJECTIVE:  Juhi Tena is a 39 y.o. female who presents for follow up after Right wrist arthroscopy with TFCC Repair and partial synovectomy - Right, Right de Quervain's release - Right, Application sugar tong splint - Right, and Excision ganglion cyst of the extensor tendon sheath first compartment on 5/16/2024.  Today patient has stiffness and mild continued pain at the ulnar wrist and radial styloid with motion but she is overall happy with her progress and overall pain relief. She has been doing physical therapy for about a month now and this has been beneficial to her and her range of motion.       PHYSICAL EXAMINATION:  Vital signs: Ht 5' 3\" (1.6 m)   Wt 64.4 kg (142 lb)   BMI 25.15 kg/m²   General: well developed and well nourished, alert, oriented times 3, and appears comfortable  Psychiatric: Normal    MUSCULOSKELETAL EXAMINATION:  Incision: Clean, dry, intact  Range of Motion: As expected  Neurovascular status: Neuro intact, good cap refill  Activity Restrictions: No restrictions  Right upper extremity:  Incisions CDI  Buried, slightly Palpable suture right " ulnar side of wrist near TFCC repair site, minimally tender  Wrist flexion to 45 and wrist extension to 20  Full pronosupination  Mild ttp dequervains incision site and 1st dorsal compartment tendons  There is radial deviation of the ring finger extensor tendon with full flexion consistent with sagittal band insufficiency  + finklesteins  2+ radial pulse          STUDIES REVIEWED:  No imaging      PROCEDURES PERFORMED:  none    Scribe Attestation      I,:   am acting as a scribe while in the presence of the attending physician.:       I,:   personally performed the services described in this documentation    as scribed in my presence.:

## 2024-08-15 ENCOUNTER — OFFICE VISIT (OUTPATIENT)
Dept: OCCUPATIONAL THERAPY | Facility: CLINIC | Age: 39
End: 2024-08-15
Payer: OTHER MISCELLANEOUS

## 2024-08-15 DIAGNOSIS — S69.81XD INJURY OF TRIANGULAR FIBROCARTILAGE COMPLEX (TFCC) OF RIGHT WRIST, SUBSEQUENT ENCOUNTER: Primary | ICD-10-CM

## 2024-08-15 PROCEDURE — 97140 MANUAL THERAPY 1/> REGIONS: CPT | Performed by: OCCUPATIONAL THERAPIST

## 2024-08-15 PROCEDURE — 97110 THERAPEUTIC EXERCISES: CPT | Performed by: OCCUPATIONAL THERAPIST

## 2024-08-15 NOTE — PROGRESS NOTES
Daily Note     Today's date: 8/15/2024  Patient name: Juhi Tena  : 1985  MRN: [id number]  Referring provider: Josue Barker PA-C  Dx:   Encounter Diagnosis     ICD-10-CM    1. 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 A kid bumped into my hand and it swelled      Objective: See treatment diary below      Assessment: Tolerated treatment fair. Patient  has continued wrist pain       Plan: Continue per plan of care. /REEVAL      Precautions: ROM only, Sx 24  F/u with MD 24    Manuals 7/15 7/19 7/22 7/24 7/30 8/5 8/8 8/12 8/15    TPR ext mass IE 30     2m 2m 2m 2m    Scar mob 3m 3m 5' 3m 3m 3m 3m 3m 2m    retrograde 3m 3m 4' 3m 3m 3m 3m 3m 3m    Graston R 4m 4m 3' 4m 4m 4m 4m 4m 4m    Neuro Re-Ed                          Wrist A/AAROM, TGEs Reviewed                                                                             Ther Ex             Wrist A/AAROM 2x10 Cone  2x10 Cone 2x 10 all planes Cone 3x10 Cone 3x10 Cone 3x10 Cone   3x10 Cone  3x10 Cone  3x10    P/S A/AAROM Dowel 2x10 Dowel 2x10 Dowel 2 x 10 Dowel 3x10 Dowel 3x10 Dowel  3x10 Dowel  3x10 Dowel  3x10 Dowel  3x10    Foam roll stretch flex/ext  x15 2x10 2x10 2x10 2x10 2x10      Finklestein stretch   x10 x10 x10 x10 x10 x10 x10    Wrist circles    Marbles 2m Marbles 2m         Powerweb           Yellow  3x10                              Ther Activity             Peg  w. supination  x30 x40 x30 x30 X30  X 30 x30 X30     Pinch pins with rot         Yellow  3x10    Gait Training                                       Modalities             MH R 8m 8m 8' 8m 5m 6m 6m 6m 6m    CP post 5m 5m 5' 5m  5m 5m 5m 5m

## 2024-08-19 ENCOUNTER — OFFICE VISIT (OUTPATIENT)
Dept: OCCUPATIONAL THERAPY | Facility: CLINIC | Age: 39
End: 2024-08-19
Payer: OTHER MISCELLANEOUS

## 2024-08-19 DIAGNOSIS — S69.81XD INJURY OF TRIANGULAR FIBROCARTILAGE COMPLEX (TFCC) OF RIGHT WRIST, SUBSEQUENT ENCOUNTER: Primary | ICD-10-CM

## 2024-08-19 PROCEDURE — 97140 MANUAL THERAPY 1/> REGIONS: CPT | Performed by: OCCUPATIONAL THERAPIST

## 2024-08-19 PROCEDURE — 97110 THERAPEUTIC EXERCISES: CPT | Performed by: OCCUPATIONAL THERAPIST

## 2024-08-19 NOTE — PROGRESS NOTES
Daily Note     Today's date: 2024  Patient name: Juhi Tena  : 1985  MRN: [id number]  Referring provider: Josue Barker PA-C  Dx:   Encounter Diagnosis     ICD-10-CM    1. 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 I have pain In my ring finger       Objective: See treatment diary below            Assessment  Impairments: abnormal or restricted ROM,  pain with function  Functional limitations: Pt. has limited ADLs due to non use of R hand.  Symptom irritability: moderate    Assessment details: Pt. Presents today for evaluation of the R wrist s/p surgery.  Pt. Has moderated edema and pain with hand use.  She is limited with her R wrist ROM and mobility.  She is unable to lift with her R hand with ADLs.  Pt. Is wearing a wrist support at all times.  Pt. Is out of work currently.  Pt. To benefit from skilled hand therapy to improve her ROM and progress to strength in order to return to work and baseline function.    24- Juih has increased ROM of her wrist and forearm . She has improved pain level .  and pinch strength  have improved . She is compliant with tx  and tx.   Understanding of Dx/Px/POC: good     Prognosis: good    Goals  STG( 6 visits)  1. Compliant with HEP/splint- met  2. Reduce pain to less than 8/10 with ROM- met  3.. Increase R wrist ROM by 10 degrees for improved function  LTG( 12 visits or discharge)  1 R wrist AROM WNLs all planes for function  2. R /pinch strength WFLs for RTW  3. Improve FOTO score to predicted outcome or greeater.    Plan  Patient would benefit from: skilled occupational therapy  Planned modality interventions: cryotherapy and thermotherapy: hydrocollator packs    Planned therapy interventions: IASTM, joint mobilization, manual therapy, home exercise program, graded exercise, therapeutic exercise, therapeutic activities, functional ROM exercises,  fine motor coordination training and orthotic fitting/training    Frequency: 2x week  Duration in weeks: 6  Plan of Care beginning date: 2024  Plan of Care expiration date:2024  Treatment plan discussed with: patient        Subjective Evaluation    History of Present Illness  Mechanism of injury: surgery  Mechanism of injury: S/P Right wrist arthroscopy with TFCC Repair and partial synovectomy - Right, Right de Quervain's release - Right, Application sugar tong splint - Right, and Excision ganglion cyst of the extensor tendon sheath first compartment on 2024  Quality of life: good    Patient Goals  Patient goals for therapy: decreased pain, increased motion, return to work and increased strength    Pain  Current pain rating: 3  At best pain ratin  At worst pain ratin previous  8  Quality: tight  Aggravating factors: lifting and nothing (ROM)    Social Support  Lives with: spouse    Employment status: working ([employer])  Hand dominance: ambidextrous    Treatments  Current treatment: occupational therapy        Objective     Tenderness     Right Wrist/Hand   Tenderness in the first dorsal compartment.     Additional Tenderness Details  TTP over incision    Neurological Testing     Sensation     Wrist/Hand     Right   Intact: light touch    Active Range of Motion     Left Elbow   Forearm supination: 70 previous 50 degrees   Forearm pronation:68 previous  55 degrees     Right Wrist   Wrist flexion: 58 previous 15 degrees with pain  Wrist extension: 45 previous 40 degrees with pain  Radial deviation:20 previous  15 degrees with pain  Ulnar deviation: 32 previous 15 degrees with pain    Right Thumb   Opposition: Full oppositon    Additional Active Range of Motion Details  Loose composite fist  + extrinsic flexor tightness.    Strength/Myotome Testing     Left Wrist/Hand      (2nd hand position)     Trial 1: 30    Thumb Strength  Key/Lateral Pinch     Trial 1: 12  Tip/Two-Point Pinch     Trial 1:  8  Palmar/Three-Point Pinch     Trial 1: 15    Right Wrist/Hand   Wrist extension:4  Wrist flexion: 4  Radial deviation: 4  Ulnar deviation: 4     (2nd hand position)     Trial 1: 15 previous 0    Thumb Strength   Key/Lateral Pinch     Trial 1: 8 pevious 0  Tip/Two-Point Pinch     Trial 1: 0  Palmar/Three-Point Pinch     Trial 1:6 previous  0    Swelling     Left Wrist/Hand   Circumference wrist: 15cm    Right Wrist/Hand   Circumference wrist: 15.4      Patient Juhi Tena :1985, female, PT PHONE:[phone]  Arrived at   Chief Complaint: No chief complaint on file.                Plan: Continue per plan of care.      Precautions: ROM only, Sx 24  F/u with MD 24    Manuals 7/15 7/19 7/22 7/24 7/30 8/5 8/8 8/12 8/15 8/19   TPR ext mass IE 30     2m 2m 2m 2m 2m   Scar mob 3m 3m 5' 3m 3m 3m 3m 3m 2m 2m   retrograde 3m 3m 4' 3m 3m 3m 3m 3m 3m 3m   Graston R 4m 4m 3' 4m 4m 4m 4m 4m 4m    Neuro Re-Ed                          Wrist A/AAROM, TGEs Reviewed            Yoke splint for R rng sag band           Apply                                                        Ther Ex             Wrist A/AAROM 2x10 Cone  2x10 Cone 2x 10 all planes Cone 3x10 Cone 3x10 Cone 3x10 Cone   3x10 Cone  3x10 Cone  3x10 Cone  3x10   P/S A/AAROM Dowel 2x10 Dowel 2x10 Dowel 2 x 10 Dowel 3x10 Dowel 3x10 Dowel  3x10 Dowel  3x10 Dowel  3x10 Dowel  3x10 Dowel  3x10   Foam roll stretch flex/ext  x15 2x10 2x10 2x10 2x10 2x10      Finklestein stretch   x10 x10 x10 x10 x10 x10 x10 x10   Wrist circles    Marbles 2m Marbles 2m         Powerweb           Yellow  3x10 Yellow  3x10                             Ther Activity             Peg  w. supination  x30 x40 x30 x30 X30  X 30 x30 X30  x30   Pinch pins with rot         Yellow  3x10 Yellow  3x10   Gait Training                                       Modalities             MH R 8m 8m 8' 8m 5m 6m 6m 6m 6m    CP post 5m 5m 5' 5m  5m 5m 5m 5m

## 2024-08-21 ENCOUNTER — OFFICE VISIT (OUTPATIENT)
Dept: OCCUPATIONAL THERAPY | Facility: CLINIC | Age: 39
End: 2024-08-21
Payer: OTHER MISCELLANEOUS

## 2024-08-21 DIAGNOSIS — S69.81XD INJURY OF TRIANGULAR FIBROCARTILAGE COMPLEX (TFCC) OF RIGHT WRIST, SUBSEQUENT ENCOUNTER: Primary | ICD-10-CM

## 2024-08-21 PROCEDURE — 97140 MANUAL THERAPY 1/> REGIONS: CPT | Performed by: OCCUPATIONAL THERAPIST

## 2024-08-21 PROCEDURE — 97110 THERAPEUTIC EXERCISES: CPT | Performed by: OCCUPATIONAL THERAPIST

## 2024-08-21 NOTE — PROGRESS NOTES
Daily Note     Today's date: 2024  Patient name: Juhi Tena  : 1985  MRN: [id number]  Referring provider: Josue Barker PA-C  Dx:   Encounter Diagnosis     ICD-10-CM    1. 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 Pt noted with inc AROM but reports pain with extension and flexion (WE>WF). Pt notes with RUE compensation due to pain of wrist, now causing pain to shoulder. Patient demonstrated fatigue post treatment and would benefit from continued OT      Plan: Continue per plan of care.      Precautions: ROM only, Sx 24  F/u with MD 24    Manuals 7/15 7/19 7/22 7/24 7/30 8/5 8/8 8/12 8/15 8/21   TPR ext mass IE 30     2m 2m 2m 2m 2m   Scar mob 3m 3m 5' 3m 3m 3m 3m 3m 2m 2m   retrograde 3m 3m 4' 3m 3m 3m 3m 3m 3m 3m   Graston R 4m 4m 3' 4m 4m 4m 4m 4m 4m    Neuro Re-Ed                          Wrist A/AAROM, TGEs Reviewed            Yoke splint for R rng sag band           Apply                                                        Ther Ex             Wrist A/AAROM 2x10 Cone  2x10 Cone 2x 10 all planes Cone 3x10 Cone 3x10 Cone 3x10 Cone   3x10 Cone  3x10 Cone  3x10 Cone  3x10   P/S A/AAROM Dowel 2x10 Dowel 2x10 Dowel 2 x 10 Dowel 3x10 Dowel 3x10 Dowel  3x10 Dowel  3x10 Dowel  3x10 Dowel  3x10 Dowel  3x10   Foam roll stretch flex/ext  x15 2x10 2x10 2x10 2x10 2x10      Finklestein stretch   x10 x10 x10 x10 x10 x10 x10 x10   Wrist circles    Marbles 2m Marbles 2m         Powerweb           Yellow  3x10 Yellow  3x10                             Ther Activity             Peg  w. supination  x30 x40 x30 x30 X30  X 30 x30 X30  x30   Pinch pins with rot         Yellow  3x10 Yellow  3x10   Gait Training                                       Modalities             MH R 8m 8m 8' 8m 5m  6m 6m 6m 6m    CP post 5m 5m 5' 5m  5m 5m 5m 5m

## 2024-08-26 ENCOUNTER — OFFICE VISIT (OUTPATIENT)
Dept: OCCUPATIONAL THERAPY | Facility: CLINIC | Age: 39
End: 2024-08-26
Payer: OTHER MISCELLANEOUS

## 2024-08-26 DIAGNOSIS — S69.81XD INJURY OF TRIANGULAR FIBROCARTILAGE COMPLEX (TFCC) OF RIGHT WRIST, SUBSEQUENT ENCOUNTER: Primary | ICD-10-CM

## 2024-08-26 PROCEDURE — 97140 MANUAL THERAPY 1/> REGIONS: CPT | Performed by: OCCUPATIONAL THERAPIST

## 2024-08-26 PROCEDURE — 97110 THERAPEUTIC EXERCISES: CPT | Performed by: OCCUPATIONAL THERAPIST

## 2024-08-26 NOTE — PROGRESS NOTES
Daily Note     Today's date: 2024  Patient name: Juhi Tena  : 1985  MRN: [id number]  Referring provider: Josue Barker PA-C  Dx:   Encounter Diagnosis     ICD-10-CM    1. 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      Objective: See treatment diary below      Assessment: Tolerated treatment fair. Pt making steady progress with her wrist mobility.  Continues with pain ulnar side of wrist but less overall with function.        Plan: Continue per plan of care.      Precautions: ROM only, Sx 24  F/u with MD 24    Manuals 8/26 7/19 7/22 7/24 7/30 8/5 8/8 8/12 8/15 8/21   TPR ext mass 2m     2m 2m 2m 2m 2m   Scar mob 3m 3m 5' 3m 3m 3m 3m 3m 2m 2m   retrograde 3m 3m 4' 3m 3m 3m 3m 3m 3m 3m   Graston R 4m 4m 3' 4m 4m 4m 4m 4m 4m    Neuro Re-Ed                          Wrist A/AAROM, TGEs             Yoke splint for R rng sag band           Apply                                                        Ther Ex             Wrist A/AAROM Cone 3x10 Cone  2x10 Cone 2x 10 all planes Cone 3x10 Cone 3x10 Cone 3x10 Cone   3x10 Cone  3x10 Cone  3x10 Cone  3x10   P/S A/AAROM SM  2x10 Dowel 2x10 Dowel 2 x 10 Dowel 3x10 Dowel 3x10 Dowel  3x10 Dowel  3x10 Dowel  3x10 Dowel  3x10 Dowel  3x10   Foam roll stretch flex/ext  x15 2x10 2x10 2x10 2x10 2x10      Finklestein stretch   x10 x10 x10 x10 x10 x10 x10 x10   Wrist circles Marbles 2 m   Marbles 2m Marbles 2m         Powerweb   YPW 2x30        Yellow  3x10 Yellow  3x10                             Ther Activity             Peg  w. supination  x30 x40 x30 x30 X30  X 30 x30 X30  x30   Pinch pins with rot Y 3 sets        Yellow  3x10 Yellow  3x10   Gait Training                                       Modalities             MH R 8m 8m 8' 8m 5m 6m 6m 6m 6m    CP post 5m 5m 5' 5m  5m 5m 5m 5m

## 2024-08-28 ENCOUNTER — OFFICE VISIT (OUTPATIENT)
Dept: OCCUPATIONAL THERAPY | Facility: CLINIC | Age: 39
End: 2024-08-28
Payer: OTHER MISCELLANEOUS

## 2024-08-28 DIAGNOSIS — S69.81XD INJURY OF TRIANGULAR FIBROCARTILAGE COMPLEX (TFCC) OF RIGHT WRIST, SUBSEQUENT ENCOUNTER: Primary | ICD-10-CM

## 2024-08-28 PROCEDURE — 97110 THERAPEUTIC EXERCISES: CPT | Performed by: OCCUPATIONAL THERAPIST

## 2024-08-28 PROCEDURE — 97140 MANUAL THERAPY 1/> REGIONS: CPT | Performed by: OCCUPATIONAL THERAPIST

## 2024-08-28 NOTE — PROGRESS NOTES
Daily Note     Today's date: 2024  Patient name: Juhi Tena  : 1985  MRN: [id number]  Referring provider: Josue Barker PA-C  Dx:   Encounter Diagnosis     ICD-10-CM    1. 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      Objective: See treatment diary below      Assessment: Tolerated treatment fair. Pt making steady progress with her wrist mobility.  Ulnar sided wrist pain irritated with internal stitch.  Unable to perform wrist curls without pain using 1lb weight.       Plan: Continue per plan of care.      Precautions: ROM only, Sx 24  F/u with MD 24    Manuals 8/26 8/28 7/22 7/24 7/30 8/5 8/8 8/12 8/15 8/21   TPR ext mass 2m     2m 2m 2m 2m 2m   Scar mob 3m 3m 5' 3m 3m 3m 3m 3m 2m 2m   retrograde 3m 3m 4' 3m 3m 3m 3m 3m 3m 3m   Graston R 4m 4m 3' 4m 4m 4m 4m 4m 4m    Neuro Re-Ed                          Wrist A/AAROM, TGEs             Yoke splint for R rng sag band           Apply                                                        Ther Ex             Wrist A/AAROM Cone 3x10 Cone  2x10 Cone 2x 10 all planes Cone 3x10 Cone 3x10 Cone 3x10 Cone   3x10 Cone  3x10 Cone  3x10 Cone  3x10   P/S A/AAROM SM  2x10 Dowel 2x10 Dowel 2 x 10 Dowel 3x10 Dowel 3x10 Dowel  3x10 Dowel  3x10 Dowel  3x10 Dowel  3x10 Dowel  3x10   Foam roll stretch flex/ext  x15 2x10 2x10 2x10 2x10 2x10      Finklestein stretch   x10 x10 x10 x10 x10 x10 x10 x10   Wrist circles Marbles 2 m Marbles 2 m  Marbles 2m Marbles 2m         Powerweb   YPW 2x30 YPW 2x30       Yellow  3x10 Yellow  3x10                             Ther Activity             Peg  w. supination   x40 x30 x30 X30  X 30 x30 X30  x30   Pinch pins with rot Y 3 sets Y 3 sets       Yellow  3x10 Yellow  3x10   Gait Training                                       Modalities             MH R 8m 8m 8' 8m 5m 6m 6m 6m 6m    CP post 5m 5m 5' 5m   5m 5m 5m 5m

## 2024-09-03 ENCOUNTER — OFFICE VISIT (OUTPATIENT)
Dept: OCCUPATIONAL THERAPY | Facility: CLINIC | Age: 39
End: 2024-09-03
Payer: OTHER MISCELLANEOUS

## 2024-09-03 DIAGNOSIS — S69.81XD INJURY OF TRIANGULAR FIBROCARTILAGE COMPLEX (TFCC) OF RIGHT WRIST, SUBSEQUENT ENCOUNTER: Primary | ICD-10-CM

## 2024-09-03 PROCEDURE — 97140 MANUAL THERAPY 1/> REGIONS: CPT | Performed by: OCCUPATIONAL THERAPIST

## 2024-09-03 PROCEDURE — 97110 THERAPEUTIC EXERCISES: CPT | Performed by: OCCUPATIONAL THERAPIST

## 2024-09-03 NOTE — PROGRESS NOTES
Daily Note     Today's date: 9/3/2024  Patient name: Juhi Tena  : 1985  MRN: [id number]  Referring provider: Josue Barker PA-C  Dx:   Encounter Diagnosis     ICD-10-CM    1. 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 Patient  has  improved ROM . Pain is improving       Plan: Continue per plan of care.      Precautions: ROM only, Sx 24  F/u with MD 24    Manuals 8/26 8/28 9/3 7/24 7/30 8/5 8/8 8/12 8/15 8/21   TPR ext mass 2m     2m 2m 2m 2m 2m   Scar mob 3m 3m 4m 3m 3m 3m 3m 3m 2m 2m   retrograde 3m 3m 4m 3m 3m 3m 3m 3m 3m 3m   Graston R 4m 4m 4m 4m 4m 4m 4m 4m 4m    Neuro Re-Ed                          Wrist A/AAROM, TGEs             Yoke splint for R rng sag band           Apply                                                        Ther Ex             Wrist A/AAROM Cone 3x10 Cone  2x10 Cone 2x 10 all planes Cone 3x10 Cone 3x10 Cone 3x10 Cone   3x10 Cone  3x10 Cone  3x10 Cone  3x10   P/S A/AAROM SM  2x10 Dowel 2x10 Dowel 2 x 10 Dowel 3x10 Dowel 3x10 Dowel  3x10 Dowel  3x10 Dowel  3x10 Dowel  3x10 Dowel  3x10   Foam roll stretch flex/ext  x15 2x10 2x10 2x10 2x10 2x10      Finklestein stretch   x10 x10 x10 x10 x10 x10 x10 x10   Wrist circles Marbles 2 m Marbles 2 m Marbles  2m Marbles 2m Marbles 2m         Powerweb   YPW 2x30 YPW 2x30 RPW  2x30      Yellow  3x10 Yellow  3x10                             Ther Activity             Peg  w. supination   x40 x30 x30 X30  X 30 x30 X30  x30   Pinch pins with rot Y 3 sets Y 3 sets R 3 sets      Yellow  3x10 Yellow  3x10   Gait Training                                       Modalities             MH R 8m 8m 8' 8m 5m 6m 6m 6m 6m    CP post 5m 5m 5' 5m  5m 5m 5m 5m

## 2024-09-06 ENCOUNTER — OFFICE VISIT (OUTPATIENT)
Dept: OCCUPATIONAL THERAPY | Facility: CLINIC | Age: 39
End: 2024-09-06
Payer: OTHER MISCELLANEOUS

## 2024-09-06 DIAGNOSIS — S69.81XD INJURY OF TRIANGULAR FIBROCARTILAGE COMPLEX (TFCC) OF RIGHT WRIST, SUBSEQUENT ENCOUNTER: Primary | ICD-10-CM

## 2024-09-06 PROCEDURE — 97110 THERAPEUTIC EXERCISES: CPT | Performed by: OCCUPATIONAL THERAPIST

## 2024-09-06 PROCEDURE — 97140 MANUAL THERAPY 1/> REGIONS: CPT | Performed by: OCCUPATIONAL THERAPIST

## 2024-09-06 NOTE — PROGRESS NOTES
Daily Note     Today's date: 2024  Patient name: Juhi Tena  : 1985  MRN: [id number]  Referring provider: Josue Barker PA-C  Dx:   Encounter Diagnosis     ICD-10-CM    1. 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      Objective: See treatment diary below      Assessment: Tolerated treatment well. Patient able to tolerated very light resistance without eliciting pain.      Plan: Continue per plan of care.      Precautions: ROM only, Sx 24  F/u with MD 24    Manuals 8/26 8/28 9/3 9/6 7/30 8/5 8/8 8/12 8/15 8/21   TPR ext mass 2m     2m 2m 2m 2m 2m   Scar mob 3m 3m 4m 4m 3m 3m 3m 3m 2m 2m   retrograde 3m 3m 4m 4m 3m 3m 3m 3m 3m 3m   Graston R 4m 4m 4m 4m 4m 4m 4m 4m 4m    Neuro Re-Ed                          Wrist A/AAROM, TGEs             Yoke splint for R rng sag band           Apply                                                        Ther Ex             Wrist A/AAROM Cone 3x10 Cone  2x10 Cone 2x 10 all planes #1 3x10 Cone 3x10 Cone 3x10 Cone   3x10 Cone  3x10 Cone  3x10 Cone  3x10   P/S A/AAROM SM  2x10 Dowel 2x10 Dowel 2 x 10 SM 3x10 Dowel 3x10 Dowel  3x10 Dowel  3x10 Dowel  3x10 Dowel  3x10 Dowel  3x10   Foam roll stretch flex/ext  x15 2x10  2x10 2x10 2x10      Finklestein stretch   x10 x10 x10 x10 x10 x10 x10 x10   Wrist circles Marbles 2 m Marbles 2 m Marbles  2m Marbles 2m Marbles 2m         Powerweb   YPW 2x30 YPW 2x30 RPW  2x30 RPW 2x30     Yellow  3x10 Yellow  3x10                             Ther Activity             Peg  w. supination   x40  x30 X30  X 30 x30 X30  x30   Pinch pins with rot Y 3 sets Y 3 sets R 3 sets R 3sets     Yellow  3x10 Yellow  3x10   Gait Training                                       Modalities             MH R 8m 8m 8' 8m 5m 6m 6m 6m 6m    CP post 5m 5m 5' 5m  5m 5m 5m 5m

## 2024-09-09 ENCOUNTER — OFFICE VISIT (OUTPATIENT)
Dept: OCCUPATIONAL THERAPY | Facility: CLINIC | Age: 39
End: 2024-09-09
Payer: OTHER MISCELLANEOUS

## 2024-09-09 DIAGNOSIS — S69.81XD INJURY OF TRIANGULAR FIBROCARTILAGE COMPLEX (TFCC) OF RIGHT WRIST, SUBSEQUENT ENCOUNTER: Primary | ICD-10-CM

## 2024-09-09 PROCEDURE — 97140 MANUAL THERAPY 1/> REGIONS: CPT | Performed by: OCCUPATIONAL THERAPIST

## 2024-09-09 PROCEDURE — 97110 THERAPEUTIC EXERCISES: CPT | Performed by: OCCUPATIONAL THERAPIST

## 2024-09-09 NOTE — PROGRESS NOTES
Daily Note     Today's date: 2024  Patient name: Juhi Tena  : 1985  MRN: [id number]  Referring provider: Josue Barker PA-C  Dx:   Encounter Diagnosis     ICD-10-CM    1. 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        Objective: See treatment diary below      Assessment: Tolerated treatment well. Patient ulnar side of wrist remains painful, pain level 4/10 with movement.        Plan: Continue per plan of care.      Precautions: ROM only, Sx 24  F/u with MD 24    Manuals 8/26 8/28 9/3 9/6 9/9 8/5 8/8 8/12 8/15 8/21   TPR ext mass 2m     2m 2m 2m 2m 2m   Scar mob 3m 3m 4m 4m 4m 3m 3m 3m 2m 2m   retrograde 3m 3m 4m 4m 4m 3m 3m 3m 3m 3m   Graston R 4m 4m 4m 4m 4m 4m 4m 4m 4m    Neuro Re-Ed                          Wrist A/AAROM, TGEs             Yoke splint for R rng sag band           Apply                                                        Ther Ex             Wrist A/AAROM Cone 3x10 Cone  2x10 Cone 2x 10 all planes #1 3x10 #1  3x10 Cone 3x10 Cone   3x10 Cone  3x10 Cone  3x10 Cone  3x10   P/S A/AAROM SM  2x10 Dowel 2x10 Dowel 2 x 10 SM 3x10 SM  3x10 Dowel  3x10 Dowel  3x10 Dowel  3x10 Dowel  3x10 Dowel  3x10   Foam roll stretch flex/ext  x15 2x10   2x10 2x10      Finklestein stretch   x10 x10 x10 x10 x10 x10 x10 x10   Wrist circles Marbles 2 m Marbles 2 m Marbles  2m Marbles 2m Marbles 2m         Powerweb   YPW 2x30 YPW 2x30 RPW  2x30 RPW 2x30 RPW 2x30    Yellow  3x10 Yellow  3x10                             Ther Activity             Peg  w. supination   x40  x30 X30  X 30 x30 X30  x30   Pinch pins with rot Y 3 sets Y 3 sets R 3 sets R 3sets     Yellow  3x10 Yellow  3x10   Gait Training                                       Modalities             MH R 8m 8m 8' 8m 5m 6m 6m 6m 6m    CP post 5m 5m 5' 5m 5m 5m 5m 5m 5m

## 2024-09-11 ENCOUNTER — OFFICE VISIT (OUTPATIENT)
Dept: OCCUPATIONAL THERAPY | Facility: CLINIC | Age: 39
End: 2024-09-11
Payer: OTHER MISCELLANEOUS

## 2024-09-11 DIAGNOSIS — S69.81XD INJURY OF TRIANGULAR FIBROCARTILAGE COMPLEX (TFCC) OF RIGHT WRIST, SUBSEQUENT ENCOUNTER: Primary | ICD-10-CM

## 2024-09-11 PROCEDURE — 97140 MANUAL THERAPY 1/> REGIONS: CPT | Performed by: OCCUPATIONAL THERAPIST

## 2024-09-11 PROCEDURE — 97110 THERAPEUTIC EXERCISES: CPT | Performed by: OCCUPATIONAL THERAPIST

## 2024-09-11 NOTE — PROGRESS NOTES
Daily Note     Today's date: 2024  Patient name: Juhi Tena  : 1985  MRN: [id number]  Referring provider: Josue Barker PA-C  Dx:   Encounter Diagnosis     ICD-10-CM    1. 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        Objective: See treatment diary below      Assessment: Tolerated treatment well. Patient making steady progress with tx.  L hand remains weak.  Unable to progress resistance due to pain level with mobility against resistance.      Plan: Continue per plan of care.      Precautions: ROM only, Sx 24  F/u with MD 24    Manuals 8/26 8/28 9/3 9/6 9/9 9/11 8/8 8/12 8/15 8/21   TPR ext mass 2m     2m 2m 2m 2m 2m   Scar mob 3m 3m 4m 4m 4m 3m 3m 3m 2m 2m   retrograde 3m 3m 4m 4m 4m 3m 3m 3m 3m 3m   Graston R 4m 4m 4m 4m 4m 4m 4m 4m 4m    Neuro Re-Ed                          Wrist A/AAROM, TGEs             Yoke splint for R rng sag band           Apply                                                        Ther Ex             Wrist A/AAROM Cone 3x10 Cone  2x10 Cone 2x 10 all planes #1 3x10 #1  3x10 #1  3x10 Cone   3x10 Cone  3x10 Cone  3x10 Cone  3x10   P/S A/AAROM SM  2x10 Dowel 2x10 Dowel 2 x 10 SM 3x10 SM  3x10 SM  3x10 Dowel  3x10 Dowel  3x10 Dowel  3x10 Dowel  3x10   Foam roll stretch flex/ext  x15 2x10   2x10 2x10      Finklestein stretch   x10 x10 x10 x10 x10 x10 x10 x10   Wrist circles Marbles 2 m Marbles 2 m Marbles  2m Marbles 2m Marbles 2m         Powerweb   YPW 2x30 YPW 2x30 RPW  2x30 RPW 2x30 RPW 2x30 RPW 2x30   Yellow  3x10 Yellow  3x10                             Ther Activity             Peg  w. supination   x40  x30  X 30 x30 X30  x30   Pinch pins with rot Y 3 sets Y 3 sets R 3 sets R 3sets  R 3sets   Yellow  3x10 Yellow  3x10   Gait Training                                       Modalities             MH R 8m 8m 8' 8m 5m 6m 6m 6m 6m    CP post  5m 5m 5' 5m 5m 5m 5m 5m 5m

## 2024-09-16 ENCOUNTER — OFFICE VISIT (OUTPATIENT)
Dept: OCCUPATIONAL THERAPY | Facility: CLINIC | Age: 39
End: 2024-09-16
Payer: OTHER MISCELLANEOUS

## 2024-09-16 DIAGNOSIS — S69.81XD INJURY OF TRIANGULAR FIBROCARTILAGE COMPLEX (TFCC) OF RIGHT WRIST, SUBSEQUENT ENCOUNTER: Primary | ICD-10-CM

## 2024-09-16 PROCEDURE — 97140 MANUAL THERAPY 1/> REGIONS: CPT | Performed by: OCCUPATIONAL THERAPIST

## 2024-09-16 PROCEDURE — 97110 THERAPEUTIC EXERCISES: CPT | Performed by: OCCUPATIONAL THERAPIST

## 2024-09-16 NOTE — PROGRESS NOTES
Daily Note     Today's date: 2024  Patient name: Juhi Tena  : 1985  MRN: [id number]  Referring provider: Josue Barker PA-C  Dx:   Encounter Diagnosis     ICD-10-CM    1. 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 Patient is making steady progress with her function.  Full AROM of the wrist.        Plan: Continue per plan of care.      Precautions: ROM only, Sx 24  F/u with MD 24    Manuals 8/26 8/28 9/3 9/6 9/9 9/11 9/16 8/12 8/15 8/21   TPR ext mass 2m     2m 2m 2m 2m 2m   Scar mob 3m 3m 4m 4m 4m 3m 3m 3m 2m 2m   retrograde 3m 3m 4m 4m 4m 3m 3m 3m 3m 3m   Graston R 4m 4m 4m 4m 4m 4m 4m 4m 4m    Neuro Re-Ed                          Wrist A/AAROM, TGEs             Yoke splint for R rng sag band           Apply                                                        Ther Ex             Wrist A/AAROM Cone 3x10 Cone  2x10 Cone 2x 10 all planes #1 3x10 #1  3x10 #1  3x10 #1  3x10 Cone  3x10 Cone  3x10 Cone  3x10   P/S A/AAROM SM  2x10 Dowel 2x10 Dowel 2 x 10 SM 3x10 SM  3x10 SM  3x10 SM  3x10 Dowel  3x10 Dowel  3x10 Dowel  3x10   Foam roll stretch flex/ext  x15 2x10   2x10       Finklestein stretch   x10 x10 x10 x10  x10 x10 x10   Wrist circles Marbles 2 m Marbles 2 m Marbles  2m Marbles 2m Marbles 2m         Powerweb   YPW 2x30 YPW 2x30 RPW  2x30 RPW 2x30 RPW 2x30 RPW 2x30 RPW 2x30  Yellow  3x10 Yellow  3x10                             Ther Activity             Peg  w. supination   x40  x30   x30 X30  x30   Pinch pins with rot Y 3 sets Y 3 sets R 3 sets R 3sets  R 3sets R 3 sets  Yellow  3x10 Yellow  3x10   Gait Training                                       Modalities             MH R 8m 8m 8' 8m 5m 6m 6m 6m 6m    CP post 5m 5m 5' 5m 5m 5m 5m 5m 5m

## 2024-09-23 ENCOUNTER — OFFICE VISIT (OUTPATIENT)
Dept: OCCUPATIONAL THERAPY | Facility: CLINIC | Age: 39
End: 2024-09-23
Payer: OTHER MISCELLANEOUS

## 2024-09-23 DIAGNOSIS — S69.81XD INJURY OF TRIANGULAR FIBROCARTILAGE COMPLEX (TFCC) OF RIGHT WRIST, SUBSEQUENT ENCOUNTER: Primary | ICD-10-CM

## 2024-09-23 PROCEDURE — 97110 THERAPEUTIC EXERCISES: CPT | Performed by: OCCUPATIONAL THERAPIST

## 2024-09-23 PROCEDURE — 97140 MANUAL THERAPY 1/> REGIONS: CPT | Performed by: OCCUPATIONAL THERAPIST

## 2024-09-23 NOTE — PROGRESS NOTES
Daily Note     Today's date: 2024  Patient name: Juhi Tena  : 1985  MRN: [id number]  Referring provider: Josue Barker PA-C  Dx:   Encounter Diagnosis     ICD-10-CM    1. 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 Patient tolerating light resistance without pain now.   She is slowly progressing with her functional strength.      Plan: Continue per plan of care.      Precautions: ROM only, Sx 24  F/u with MD 24    Manuals 8/26 8/28 9/3 9/6 9/9 9/11 9/16 9/23 8/15 8/21   TPR ext mass 2m     2m 2m 2m 2m 2m   Scar mob 3m 3m 4m 4m 4m 3m 3m 3m 2m 2m   retrograde 3m 3m 4m 4m 4m 3m 3m 3m 3m 3m   Graston R 4m 4m 4m 4m 4m 4m 4m 4m 4m    Neuro Re-Ed                          Wrist A/AAROM, TGEs             Yoke splint for R rng sag band           Apply                                                        Ther Ex             Wrist A/AAROM Cone 3x10 Cone  2x10 Cone 2x 10 all planes #1 3x10 #1  3x10 #1  3x10 #1  3x10 #2  3x10 Cone  3x10 Cone  3x10   P/S A/AAROM SM  2x10 Dowel 2x10 Dowel 2 x 10 SM 3x10 SM  3x10 SM  3x10 SM  3x10 Y  3x10 Dowel  3x10 Dowel  3x10   Foam roll stretch flex/ext  x15 2x10   2x10       Finklestein stretch   x10 x10 x10 x10  x10 x10 x10   Wrist circles Marbles 2 m Marbles 2 m Marbles  2m Marbles 2m Marbles 2m         Powerweb   YPW 2x30 YPW 2x30 RPW  2x30 RPW 2x30 RPW 2x30 RPW 2x30 RPW 2x30 GPW 2x30 Yellow  3x10 Yellow  3x10                             Ther Activity             Peg  w. supination   x40  x30   x30 X30  x30   Pinch pins with rot Y 3 sets Y 3 sets R 3 sets R 3sets  R 3sets R 3 sets G 3 sets Yellow  3x10 Yellow  3x10   Gait Training                                       Modalities             MH R 8m 8m 8' 8m 5m 6m 6m 6m 6m    CP post 5m 5m 5' 5m 5m 5m 5m 5m 5m

## 2024-09-25 ENCOUNTER — OFFICE VISIT (OUTPATIENT)
Dept: OCCUPATIONAL THERAPY | Facility: CLINIC | Age: 39
End: 2024-09-25
Payer: OTHER MISCELLANEOUS

## 2024-09-25 DIAGNOSIS — S69.81XD INJURY OF TRIANGULAR FIBROCARTILAGE COMPLEX (TFCC) OF RIGHT WRIST, SUBSEQUENT ENCOUNTER: Primary | ICD-10-CM

## 2024-09-25 PROCEDURE — 97140 MANUAL THERAPY 1/> REGIONS: CPT | Performed by: OCCUPATIONAL THERAPIST

## 2024-09-25 PROCEDURE — 97110 THERAPEUTIC EXERCISES: CPT | Performed by: OCCUPATIONAL THERAPIST

## 2024-09-25 NOTE — PROGRESS NOTES
"Daily Note     Today's date: 2024  Patient name: Juhi Tena  : 1985  MRN: [id number]  Referring provider: Josue Barker PA-C  Dx:   Encounter Diagnosis     ICD-10-CM    1. 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 Pt reports pain on R wrist as 2/10.        Objective: See treatment diary below      Assessment: Tolerated treatment well. Patient tolerating light resistance with minimal pain and discomfort, however she does not have any pain without movement. Pt is slowly progressing with her functional strength during progressive resistance exercises.      Plan: Continue per plan of care.      Precautions: ROM only, Sx 24  F/u with MD 24    Manuals 8/26 8/28 9/3 9/6 9/9 9/11 9/16 9/23 9/25 8/21   TPR ext mass 2m     2m 2m 2m  2m   Scar mob 3m 3m 4m 4m 4m 3m 3m 3m 2m 2m   retrograde 3m 3m 4m 4m 4m 3m 3m 3m 3m 3m   Graston R 4m 4m 4m 4m 4m 4m 4m 4m 4m    Neuro Re-Ed                          Wrist A/AAROM, TGEs             Yoke splint for R rng sag band           Apply                                                        Ther Ex             Wrist A/AAROM Cone 3x10 Cone  2x10 Cone 2x 10 all planes #1 3x10 #1  3x10 #1  3x10 #1  3x10 #2  3x10 #2  3x10 Cone  3x10   P/S A/AAROM SM  2x10 Dowel 2x10 Dowel 2 x 10 SM 3x10 SM  3x10 SM  3x10 SM  3x10 Y  3x10  Dowel  3x10   Foam roll stretch flex/ext  x15 2x10   2x10       Finklestein stretch   x10 x10 x10 x10  x10  x10   Wrist circles Marbles 2 m Marbles 2 m Marbles  2m Marbles 2m Marbles 2m         Powerweb   YPW 2x30 YPW 2x30 RPW  2x30 RPW 2x30 RPW 2x30 RPW 2x30 RPW 2x30 GPW 2x30 GPW  2x30 Yellow  3x10            GPW  10x3  Wrist/forearm stabilization with little oscillations                 Ther Activity             Peg pick " up w. supination   x40  x30   x30   x30   Pinch pins with rot Y 3 sets Y 3 sets R 3 sets R 3sets  R 3sets R 3 sets G 3 sets Green 5 sets Yellow  3x10   Gait Training                                       Modalities             MH R 8m 8m 8' 8m 5m 6m 6m 6m 6m    CP post 5m 5m 5' 5m 5m 5m 5m 5m 5m

## 2024-09-30 ENCOUNTER — OFFICE VISIT (OUTPATIENT)
Dept: OCCUPATIONAL THERAPY | Facility: CLINIC | Age: 39
End: 2024-09-30
Payer: OTHER MISCELLANEOUS

## 2024-09-30 DIAGNOSIS — S69.81XD INJURY OF TRIANGULAR FIBROCARTILAGE COMPLEX (TFCC) OF RIGHT WRIST, SUBSEQUENT ENCOUNTER: Primary | ICD-10-CM

## 2024-09-30 PROCEDURE — 97140 MANUAL THERAPY 1/> REGIONS: CPT | Performed by: OCCUPATIONAL THERAPIST

## 2024-09-30 PROCEDURE — 97110 THERAPEUTIC EXERCISES: CPT | Performed by: OCCUPATIONAL THERAPIST

## 2024-09-30 NOTE — PROGRESS NOTES
Daily Note     Today's date: 2024  Patient name: Juhi Tena  : 1985  MRN: [id number]  Referring provider: Josue Barker PA-C  Dx:   Encounter Diagnosis     ICD-10-CM    1. 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          Objective: See treatment diary below      Assessment: Tolerated treatment well. Patient continues with internal stitch irritation over the ulnar aspect of the wrist.  Her AROM and function is progressing well.  Pt. Will be traveling for 1 week and will return to therapy when she returns.  F/U with MD is 10/21.        Plan: Continue per plan of care.      Precautions: ROM only, Sx 24  F/u with MD 24    Manuals 8/26 8/28 9/3 9/6 9/9 9/11 9/16 9/23 9/25 9/30   TPR ext mass 2m     2m 2m 2m  2m   Scar mob 3m 3m 4m 4m 4m 3m 3m 3m 2m 2m   retrograde 3m 3m 4m 4m 4m 3m 3m 3m 3m 3m   Graston R 4m 4m 4m 4m 4m 4m 4m 4m 4m 4m   Neuro Re-Ed                          Wrist A/AAROM, TGEs             Yoke splint for R rng sag band                                                                   Ther Ex             Wrist A/AAROM Cone 3x10 Cone  2x10 Cone 2x 10 all planes #1 3x10 #1  3x10 #1  3x10 #1  3x10 #2  3x10 #2  3x10 #2  3x10   P/S A/AAROM SM  2x10 Dowel 2x10 Dowel 2 x 10 SM 3x10 SM  3x10 SM  3x10 SM  3x10 Y  3x10  R  3x10   Foam roll stretch flex/ext  x15 2x10   2x10       Finklestein stretch   x10 x10 x10 x10  x10     Wrist circles Marbles 2 m Marbles 2 m Marbles  2m Marbles 2m Marbles 2m         Powerweb   YPW 2x30 YPW 2x30 RPW  2x30 RPW 2x30 RPW 2x30 RPW 2x30 RPW 2x30 GPW 2x30 GPW  2x30 GPW  3x10            GPW  10x3  Wrist/forearm stabilization with little oscillations GPW 3x10                Ther Activity             Peg  w. supination   x40  x30   x30      Pinch pins with rot Y 3 sets Y 3 sets R 3 sets R 3sets  R 3sets R 3 sets G 3 sets  Green 5 sets Blue  3x10   Gait Training                                       Modalities             MH R 8m 8m 8' 8m 5m 6m 6m 6m 6m 6m   CP post 5m 5m 5' 5m 5m 5m 5m 5m 5m

## 2024-10-01 ENCOUNTER — OFFICE VISIT (OUTPATIENT)
Dept: OCCUPATIONAL THERAPY | Facility: CLINIC | Age: 39
End: 2024-10-01
Payer: OTHER MISCELLANEOUS

## 2024-10-01 DIAGNOSIS — S69.81XD INJURY OF TRIANGULAR FIBROCARTILAGE COMPLEX (TFCC) OF RIGHT WRIST, SUBSEQUENT ENCOUNTER: Primary | ICD-10-CM

## 2024-10-01 PROCEDURE — 97110 THERAPEUTIC EXERCISES: CPT | Performed by: OCCUPATIONAL THERAPIST

## 2024-10-01 PROCEDURE — 97140 MANUAL THERAPY 1/> REGIONS: CPT | Performed by: OCCUPATIONAL THERAPIST

## 2024-10-01 NOTE — PROGRESS NOTES
Daily Note     Today's date: 10/1/2024  Patient name: Jhui Tena  : 1985  MRN: [id number]  Referring provider: Josue Barker PA-C  Dx:   Encounter Diagnosis     ICD-10-CM    1. 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 Patient  has mild edema dorsal wrist       Plan: Continue per plan of care.      Precautions: ROM only, Sx 24  F/u with MD 24    Manuals 10/1 8/28 9/3 9/6 9/9 9/11 9/16 9/23 9/25 9/30   TPR ext mass 2m     2m 2m 2m  2m   Scar mob 3m 3m 4m 4m 4m 3m 3m 3m 2m 2m   retrograde 3m 3m 4m 4m 4m 3m 3m 3m 3m 3m   Graston R 4m 4m 4m 4m 4m 4m 4m 4m 4m 4m   Neuro Re-Ed                          Wrist A/AAROM, TGEs             Yoke splint for R rng sag band                                                                   Ther Ex             Wrist A/AAROM 2#  3x10 Cone  2x10 Cone 2x 10 all planes #1 3x10 #1  3x10 #1  3x10 #1  3x10 #2  3x10 #2  3x10 #2  3x10   P/S A/AAROM red  2x10 Dowel 2x10 Dowel 2 x 10 SM 3x10 SM  3x10 SM  3x10 SM  3x10 Y  3x10  R  3x10   Foam roll stretch flex/ext  x15 2x10   2x10       Finklestein stretch   x10 x10 x10 x10  x10     Wrist circles Marbles 2 m Marbles 2 m Marbles  2m Marbles 2m Marbles 2m         Powerweb   BPW 2x30 YPW 2x30 RPW  2x30 RPW 2x30 RPW 2x30 RPW 2x30 RPW 2x30 GPW 2x30 GPW  2x30 GPW  3x10            GPW  10x3  Wrist/forearm stabilization with little oscillations GPW 3x10                Ther Activity             Peg  w. supination   x40  x30   x30      Pinch pins  Blue  3 sets  Y 3 sets R 3 sets R 3sets  R 3sets R 3 sets G 3 sets Green 5 sets Blue  3x10   Gait Training                                       Modalities             MH R 8m 8m 8' 8m 5m 6m 6m 6m 6m 6m   CP post 5m 5m 5' 5m 5m 5m 5m 5m 5m

## 2024-10-15 ENCOUNTER — OFFICE VISIT (OUTPATIENT)
Dept: OCCUPATIONAL THERAPY | Facility: CLINIC | Age: 39
End: 2024-10-15
Payer: OTHER MISCELLANEOUS

## 2024-10-15 DIAGNOSIS — S69.81XD INJURY OF TRIANGULAR FIBROCARTILAGE COMPLEX (TFCC) OF RIGHT WRIST, SUBSEQUENT ENCOUNTER: Primary | ICD-10-CM

## 2024-10-15 PROCEDURE — 97140 MANUAL THERAPY 1/> REGIONS: CPT | Performed by: OCCUPATIONAL THERAPIST

## 2024-10-15 PROCEDURE — 97110 THERAPEUTIC EXERCISES: CPT | Performed by: OCCUPATIONAL THERAPIST

## 2024-10-15 NOTE — PROGRESS NOTES
Daily Note     Today's date: 10/15/2024  Patient name: Juhi Tena  : 1985  MRN: [id number]  Referring provider: Josue Barker PA-C  Dx:   Encounter Diagnosis     ICD-10-CM    1. 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 I am avoiding lifting heavy items, however I am able to perform ADLs with no problem. I am going back to the doctor on 10/21/24.      Objective: See treatment diary below      Assessment: Tolerated treatment well. Patient continues to have mild edema in R dorsal wrist  . Pt was unable to tolerate progressed resistance during therapeutic exercises, however pt was able to tolerate increased repetitions. Verbal cueing provided for proper positioning during exercises. No pain or discomfort reported post tx session.      Plan: Continue per plan of care.      Precautions: ROM only, Sx 24  F/u with MD 24    Manuals 10/1 10/15 9/3 9/6 9/9 9/11 9/16 9/23 9/25 9/30   TPR ext mass 2m 2m    2m 2m 2m  2m   Scar mob 3m 2m 4m 4m 4m 3m 3m 3m 2m 2m   retrograde 3m 3m 4m 4m 4m 3m 3m 3m 3m 3m   Graston R 4m 4m 4m 4m 4m 4m 4m 4m 4m 4m   Neuro Re-Ed                          Wrist A/AAROM, TGEs             Yoke splint for R rng sag band                                                                   Ther Ex             Wrist A/AAROM 2#  3x10 2#  3x10 Cone 2x 10 all planes #1 3x10 #1  3x10 #1  3x10 #1  3x10 #2  3x10 #2  3x10 #2  3x10   P/S A/AAROM red  2x10 Red  3x10 Dowel 2 x 10 SM 3x10 SM  3x10 SM  3x10 SM  3x10 Y  3x10  R  3x10   Foam roll stretch flex/ext   2x10   2x10       Finklestein stretch   x10 x10 x10 x10  x10     Wrist circles Marbles 2 m Marbles 2 m Marbles  2m Marbles 2m Marbles 2m         Powerweb   BPW 2x30 Blue PW 2x30 RPW  2x30 RPW 2x30 RPW 2x30 RPW 2x30 RPW 2x30 GPW 2x30 GPW  2x30 GPW  3x10            GPW  10x3  Wrist/forearm stabilization  with little oscillations GPW 3x10                Ther Activity             Peg  w. supination   x40  x30   x30      Pinch pins  Blue  3 sets  Blue 5 sets, varying grasps R 3 sets R 3sets  R 3sets R 3 sets G 3 sets Green 5 sets Blue  3x10   Gait Training                                       Modalities             MH R 8m 8m 8' 8m 5m 6m 6m 6m 6m 6m   CP post 5m 5m 5' 5m 5m 5m 5m 5m 5m

## 2024-10-17 ENCOUNTER — OFFICE VISIT (OUTPATIENT)
Dept: OCCUPATIONAL THERAPY | Facility: CLINIC | Age: 39
End: 2024-10-17
Payer: OTHER MISCELLANEOUS

## 2024-10-17 DIAGNOSIS — S69.81XD INJURY OF TRIANGULAR FIBROCARTILAGE COMPLEX (TFCC) OF RIGHT WRIST, SUBSEQUENT ENCOUNTER: Primary | ICD-10-CM

## 2024-10-17 PROCEDURE — 97110 THERAPEUTIC EXERCISES: CPT | Performed by: OCCUPATIONAL THERAPIST

## 2024-10-17 PROCEDURE — 97140 MANUAL THERAPY 1/> REGIONS: CPT | Performed by: OCCUPATIONAL THERAPIST

## 2024-10-17 NOTE — PROGRESS NOTES
Daily Note     Today's date: 10/17/2024  Patient name: Juhi Tena  : 1985  MRN: [id number]  Referring provider: Josue Barker PA-C  Dx:   Encounter Diagnosis     ICD-10-CM    1. 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 My dog jumped on my hand and bent it       Objective: See treatment diary below                  Assessment  Impairments: abnormal or restricted ROM,  pain with function  Functional limitations: Pt. has limited ADLs due to non use of R hand.  Symptom irritability: moderate    Assessment details: Pt. Presents today for evaluation of the R wrist s/p surgery.  Pt. Has moderated edema and pain with hand use.  She is limited with her R wrist ROM and mobility.  She is unable to lift with her R hand with ADLs.  Pt. Is wearing a wrist support at all times.  Pt. Is out of work currently.  Pt. To benefit from skilled hand therapy to improve her ROM and progress to strength in order to return to work and baseline function.    24- Juhi has increased ROM of her wrist and forearm . She has improved pain level .  and pinch strength  have improved . She is compliant with tx  and tx.   Understanding of Dx/Px/POC: good    10/17/24- Nadege has improved ROM of R wrist and forearm . She has improved pain . Strength is progressing . She has mild ADL impairment.       Prognosis: good    Goals  STG( 6 visits)  1. Compliant with HEP/splint- met  2. Reduce pain to less than 8/10 with ROM- met  3.. Increase R wrist ROM by 10 degrees for improved function  LTG( 12 visits or discharge)  1 R wrist AROM WNLs all planes for function  2. R /pinch strength WFLs for RTW  3. Improve FOTO score to predicted outcome or greeater.    Plan  Patient would benefit from: skilled occupational therapy  Planned modality interventions: cryotherapy and thermotherapy: hydrocollator packs    Planned therapy  interventions: IASTM, joint mobilization, manual therapy, home exercise program, graded exercise, therapeutic exercise, therapeutic activities, functional ROM exercises, fine motor coordination training and orthotic fitting/training    Frequency: 2x week  Duration in weeks: 6  Plan of Care beginning date: 10/17/2024  Plan of Care expiration date:2024  Treatment plan discussed with: patient        Subjective Evaluation    History of Present Illness  Mechanism of injury: surgery  Mechanism of injury: S/P Right wrist arthroscopy with TFCC Repair and partial synovectomy - Right, Right de Quervain's release - Right, Application sugar tong splint - Right, and Excision ganglion cyst of the extensor tendon sheath first compartment on 2024  Quality of life: good    Patient Goals  Patient goals for therapy: decreased pain, increased motion, return to work and increased strength    Pain  Current pain rating: 3  At best pain ratin  At worst pain ratin previous  8  Quality: tight  Aggravating factors: lifting and nothing (ROM)    Social Support  Lives with: spouse    Employment status: working ([email or web address])  Hand dominance: ambidextrous    Treatments  Current treatment: occupational therapy        Objective     Tenderness     Right Wrist/Hand   Tenderness in the first dorsal compartment.     Additional Tenderness Details  TTP over incision    Neurological Testing     Sensation     Wrist/Hand     Right   Intact: light touch    Active Range of Motion     Left Elbow   Forearm supination: 70 previous 50 degrees   Forearm pronation: 74 previous  55 degrees     Right Wrist   Wrist flexion:64previous 15 degrees with pain  Wrist extension: 68 previous 40 degrees with pain  Radial deviation:20 previous  15 degrees with pain  Ulnar deviation: 38 previous 15 degrees with pain    Right Thumb   Opposition: Full oppositon         Strength/Myotome Testing     Left Wrist/Hand      (2nd hand position)     Trial 1: 30    Thumb  Strength  Key/Lateral Pinch     Trial 1: 12  Tip/Two-Point Pinch     Trial 1: 8  Palmar/Three-Point Pinch     Trial 1: 15    Right Wrist/Hand   Wrist extension:5 previous 4  Wrist flexion:5 previous  4  Radial deviation:5 previous  4  Ulnar deviation:previous  4     (2nd hand position)     Trial 1: 120previous 0    Thumb Strength   Key/Lateral Pinch     Trial 1: 9 pevious 0  Tip/Two-Point Pinch     Trial 1:7 previous  0  Palmar/Three-Point Pinch     Trial 1:7 previous  0    Swelling     Left Wrist/Hand   Circumference wrist: 15cm    Right Wrist/Hand   Circumference wrist: 15.4      Patient Juhi Tena :1985, female, PT PHONE:[phone]  Arrived at   Chief Complaint: No chief complaint on file.            Plan: Continue per plan of care.      Precautions: ROM only, Sx 24  F/u with MD 24    Manuals 10/1 10/15 10/17 9/6 9/9 9/11 9/16 9/23 9/25 9/30   TPR ext mass 2m 2m    2m 2m 2m  2m   Scar mob 3m 2m 4m 4m 4m 3m 3m 3m 2m 2m   retrograde 3m 3m 4m 4m 4m 3m 3m 3m 3m 3m   Graston R 4m 4m 4m 4m 4m 4m 4m 4m 4m 4m   Neuro Re-Ed                          Wrist A/AAROM, TGEs             Yoke splint for R rng sag band                                                                   Ther Ex             Wrist A/AAROM 2#  3x10 2#  3x10 2#  3x10 #1 3x10 #1  3x10 #1  3x10 #1  3x10 #2  3x10 #2  3x10 #2  3x10   P/S A/AAROM red  2x10 Red  3x10 Red  3x10 SM 3x10 SM  3x10 SM  3x10 SM  3x10 Y  3x10  R  3x10   Foam roll stretch flex/ext      2x10       Finklestein stretch    x10 x10 x10  x10     Wrist circles Marbles 2 m Marbles 2 m Marbles  2m Marbles 2m Marbles 2m         Powerweb   BPW 2x30 Blue PW 2x30 BPW  2x30 RPW 2x30 RPW 2x30 RPW 2x30 RPW 2x30 GPW 2x30 GPW  2x30 GPW  3x10            GPW  10x3  Wrist/forearm stabilization with little oscillations GPW 3x10                Ther Activity             Peg  w. supination   x40  x30   x30      Pinch pins  Blue  3 sets  Blue 5 sets, varying grasps R 3 sets  R 3sets  R 3sets R 3 sets G 3 sets Green 5 sets Blue  3x10   Gait Training                                       Modalities             MH R 8m 8m 8m 8m 5m 6m 6m 6m 6m 6m   CP post 5m 5m 5m 5m 5m 5m 5m 5m 5m

## 2024-10-21 ENCOUNTER — OFFICE VISIT (OUTPATIENT)
Dept: OBGYN CLINIC | Facility: CLINIC | Age: 39
End: 2024-10-21
Payer: OTHER MISCELLANEOUS

## 2024-10-21 ENCOUNTER — OFFICE VISIT (OUTPATIENT)
Dept: OCCUPATIONAL THERAPY | Facility: CLINIC | Age: 39
End: 2024-10-21
Payer: OTHER MISCELLANEOUS

## 2024-10-21 VITALS — HEIGHT: 63 IN | BODY MASS INDEX: 25.16 KG/M2 | WEIGHT: 142 LBS

## 2024-10-21 DIAGNOSIS — S69.81XD INJURY OF TRIANGULAR FIBROCARTILAGE COMPLEX (TFCC) OF RIGHT WRIST, SUBSEQUENT ENCOUNTER: ICD-10-CM

## 2024-10-21 DIAGNOSIS — Z47.89 AFTERCARE FOLLOWING SURGERY OF THE MUSCULOSKELETAL SYSTEM: Primary | ICD-10-CM

## 2024-10-21 DIAGNOSIS — S69.81XD INJURY OF TRIANGULAR FIBROCARTILAGE COMPLEX (TFCC) OF RIGHT WRIST, SUBSEQUENT ENCOUNTER: Primary | ICD-10-CM

## 2024-10-21 PROCEDURE — 97110 THERAPEUTIC EXERCISES: CPT | Performed by: OCCUPATIONAL THERAPIST

## 2024-10-21 PROCEDURE — 97140 MANUAL THERAPY 1/> REGIONS: CPT | Performed by: OCCUPATIONAL THERAPIST

## 2024-10-21 PROCEDURE — 99213 OFFICE O/P EST LOW 20 MIN: CPT | Performed by: ORTHOPAEDIC SURGERY

## 2024-10-21 NOTE — PROGRESS NOTES
Daily Note     Today's date: 10/21/2024  Patient name: Juhi Tena  : 1985  MRN: [id number]  Referring provider: Josue Barker PA-C  Dx:   Encounter Diagnosis     ICD-10-CM    1. 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 Patient  has trigger points in extensors. ROM is improving       Plan: Continue per plan of care.      Precautions: ROM only, Sx 24  F/u with MD 24    Manuals 10/1 10/15 10/17 10/21 9/9 9/11 9/16 9/23 9/25 9/30   TPR ext mass 2m 2m    2m 2m 2m  2m   Scar mob 3m 2m 4m 4m 4m 3m 3m 3m 2m 2m   retrograde 3m 3m 4m 4m 4m 3m 3m 3m 3m 3m   Graston R 4m 4m 4m 4m 4m 4m 4m 4m 4m 4m   Neuro Re-Ed                          Wrist A/AAROM, TGEs             Yoke splint for R rng sag band                                                                   Ther Ex             Wrist A/AAROM 2#  3x10 2#  3x10 2#  3x10 2#   3x10 #1  3x10 #1  3x10 #1  3x10 #2  3x10 #2  3x10 #2  3x10   P/S A/AAROM red  2x10 Red  3x10 Red  3x10 SM 3x10 SM  3x10 SM  3x10 SM  3x10 Y  3x10  R  3x10   Foam roll stretch flex/ext      2x10       Finklestein stretch    x10 x10 x10  x10     Wrist circles Marbles 2 m Marbles 2 m Marbles  2m Marbles 2m Marbles 2m         Powerweb   BPW 2x30 Blue PW 2x30 BPW  2x30 RPW 2x30 RPW 2x30 RPW 2x30 RPW 2x30 GPW 2x30 GPW  2x30 GPW  3x10            GPW  10x3  Wrist/forearm stabilization with little oscillations GPW 3x10                Ther Activity             Peg  w. supination   x40  x30   x30      Pinch pins  Blue  3 sets  Blue 5 sets, varying grasps R 3 sets G  3sets  R 3sets R 3 sets G 3 sets Green 5 sets Blue  3x10   Gait Training                                       Modalities             MH R 8m 8m 8m 8m 5m 6m 6m 6m 6m 6m   CP post 5m 5m 5m 5m 5m 5m 5m 5m 5m

## 2024-10-21 NOTE — PROGRESS NOTES
ASSESSMENT/PLAN:    Assessment:   Status post TFCC repair performed on 05/16/2024    Plan:   Continue strengthening and ROM exercises with physical therapy. She has no formal restrictions.  She may return to activities as tolerated.    Follow Up:  PRN    _____________________________________________________  CHIEF COMPLAINT:  Chief Complaint   Patient presents with    Right Wrist - Follow-up     TFCC Repair - 5/16/24       SUBJECTIVE:  Juhi Tena is a 39 y.o. female who presents for follow up status post TFCC repair performed on 5/16/2024.  She continues to attend and progress in physical therapy.  She does note she feels a knot over the area of the surgery.  She notes occasional increase in swelling.  She has increased pain with weather changes and increased activities.      PAST MEDICAL HISTORY:  Past Medical History:   Diagnosis Date    Anemia     Anxiety     Chronic pain disorder     wrist right    COVID 2021    De Quervain's tenosynovitis, right     Hx of bleeding disorder     Von Willebrand disease (HCC)     Wears glasses        PAST SURGICAL HISTORY:  Past Surgical History:   Procedure Laterality Date    NO PAST SURGERIES      NV APPLICATION LONG ARM SPLINT SHOULDER HAND Right 5/16/2024    Procedure: Application sugar tong splint;  Surgeon: Louie Barone MD;  Location:  MAIN OR;  Service: Orthopedics    NV ARTHROSCOPY WRIST SURGICAL SYNOVECTOMY PARTIAL Right 5/16/2024    Procedure: Right wrist arthroscopy with TFCC Repair and partial synovectomy;  Surgeon: Louie Barone MD;  Location:  MAIN OR;  Service: Orthopedics    NV ARTHRS WRST EXC&/RPR TRIANG FIBROCART&/JOINT Right 5/16/2024    Procedure: Right wrist arthroscopy with TFCC Repair and partial synovectomy;  Surgeon: Louie Barone MD;  Location: [facility];  Service: Orthopedics    NV EXCISION GANGLION WRIST DORSAL/VOLAR PRIMARY  5/16/2024    Procedure: Excision ganglion cyst of the extensor tendon sheath first compartment;  Surgeon:  Louie Barone MD;  Location:  MAIN OR;  Service: Orthopedics    CO INCISION EXTENSOR TENDON SHEATH WRIST Right 5/16/2024    Procedure: Right de Quervain's release;  Surgeon: Louie Barone MD;  Location:  MAIN OR;  Service: Orthopedics       FAMILY HISTORY:  Family History   Problem Relation Age of Onset    No Known Problems Mother     No Known Problems Father        SOCIAL HISTORY:  Social History     Tobacco Use    Smoking status: Never    Smokeless tobacco: Never   Vaping Use    Vaping status: Never Used   Substance Use Topics    Alcohol use: Yes    Drug use: Never       MEDICATIONS:    Current Outpatient Medications:     acetaminophen (TYLENOL) 325 mg tablet, Take 650 mg by mouth every 6 (six) hours as needed for mild pain, Disp: , Rfl:     [facility] Naproxen Sodium 220 MG CAPS, TAKE 1 CAPSULE (220 MG TOTAL) BY MOUTH 2 (TWO) TIMES A DAY *OTC*, Disp: 60 capsule, Rfl: 0    Diclofenac Sodium (VOLTAREN) 1 %, Apply 2 g topically if needed, Disp: , Rfl:     diphenhydrAMINE (BENADRYL) 25 mg capsule, Take 25 mg by mouth every 6 (six) hours as needed for sleep, Disp: , Rfl:     Green Tea, Estefania sinensis, (GREEN TEA EXTRACT PO), Take by mouth, Disp: , Rfl:     ibuprofen (MOTRIN) 200 mg tablet, Take by mouth every 6 (six) hours as needed for mild pain, Disp: , Rfl:     multivitamin (THERAGRAN) TABS, Take 1 tablet by mouth daily, Disp: , Rfl:     naproxen (NAPROSYN) 250 mg tablet, Take 250 mg by mouth 2 (two) times a day with meals, Disp: , Rfl:     sertraline (ZOLOFT) 25 mg tablet, Take 25 mg by mouth daily Patient stated taking 100mg, Disp: , Rfl:     acetaminophen (TYLENOL) 650 mg CR tablet, Take 1 tablet (650 mg total) by mouth every 8 (eight) hours as needed for mild pain (Patient not taking: Reported on 10/21/2024), Disp: 30 tablet, Rfl: 0    traMADol (Ultram) 50 mg tablet, Take 1 tablet (50 mg total) by mouth every 6 (six) hours as needed for moderate pain (Patient not taking: Reported on 10/21/2024), Disp:  "10 tablet, Rfl: 0    ALLERGIES:  Allergies   Allergen Reactions    Shellfish-Derived Products - Food Allergy Anaphylaxis       REVIEW OF SYSTEMS:  Pertinent items are noted in HPI.  A comprehensive review of systems was negative.    LABS:  HgA1c: No results found for: \"HGBA1C\"  BMP: No results found for: \"GLUCOSE\", \"CALCIUM\", \"NA\", \"K\", \"CO2\", \"CL\", \"BUN\", \"CREATININE\"    _____________________________________________________  PHYSICAL EXAMINATION:  Vital signs: Ht 5' 3\" (1.6 m)   Wt 64.4 kg (142 lb)   BMI 25.15 kg/m²   General: well developed and well nourished, alert, oriented times 3, and appears comfortable  Psychiatric: Normal  HEENT: Trachea Midline, No torticollis  Cardiovascular: No discernable arrhythmia  Pulmonary: No wheezing or stridor  Abdomen: No rebound or guarding  Extremities: No peripheral edema  Skin: No masses, erythema, lacerations, fluctation, ulcerations  Neurovascular: Sensation Intact to the Median, Ulnar, Radial Nerve, Motor Intact to the Median, Ulnar, Radial Nerve, and Pulses Intact    MUSCULOSKELETAL EXAMINATION:  Right wrist: well healed incision, palpable knot present, 5 degrees lacking from full wrist flexion, wrist is stable    _____________________________________________________  STUDIES REVIEWED:  No Studies to review      PROCEDURES PERFORMED:  Procedures  No Procedures performed today   Scribe Attestation      I,:  Jana Stewart am acting as a scribe while in the presence of the attending physician.:       I,:  Louie Barone MD personally performed the services described in this documentation    as scribed in my presence.:             "

## 2024-10-23 ENCOUNTER — OFFICE VISIT (OUTPATIENT)
Dept: OCCUPATIONAL THERAPY | Facility: CLINIC | Age: 39
End: 2024-10-23
Payer: OTHER MISCELLANEOUS

## 2024-10-23 DIAGNOSIS — S69.81XD INJURY OF TRIANGULAR FIBROCARTILAGE COMPLEX (TFCC) OF RIGHT WRIST, SUBSEQUENT ENCOUNTER: Primary | ICD-10-CM

## 2024-10-23 PROCEDURE — 97140 MANUAL THERAPY 1/> REGIONS: CPT | Performed by: OCCUPATIONAL THERAPIST

## 2024-10-23 PROCEDURE — 97110 THERAPEUTIC EXERCISES: CPT | Performed by: OCCUPATIONAL THERAPIST

## 2024-10-23 NOTE — PROGRESS NOTES
Daily Note     Today's date: 10/23/2024  Patient name: Juhi Tena  : 1985  MRN: [id number]  Referring provider: Josue Barker PA-C  Dx:   Encounter Diagnosis     ICD-10-CM    1. 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 Pt reports seeing the doctor this past Monday.      Objective: See treatment diary below      Assessment: Tolerated treatment fair. Patient  has trigger points in extensors. ROM/strength is improving. Educated pt on performing Finkelstein stretch at home or when out in community to help relieve R wrist and thumb pain.       Plan: Continue per plan of care.      Precautions: ROM only, Sx 24  F/u with MD 24    Manuals 10/1 10/15 10/17 10/21 10/23 9/11 9/16 9/23 9/25 9/30   TPR ext mass 2m 2m    2m 2m 2m  2m   Scar mob 3m 2m 4m 4m 4m 3m 3m 3m 2m 2m   retrograde 3m 3m 4m 4m 4m 3m 3m 3m 3m 3m   Graston R 4m 4m 4m 4m 4m 4m 4m 4m 4m 4m   Neuro Re-Ed                          Wrist A/AAROM, TGEs             Yoke splint for R rng sag band                                                                   Ther Ex             Wrist A/AAROM 2#  3x10 2#  3x10 2#  3x10 2#   3x10 2#  3x10  Wrist radial and ulnar deviation    2#  3x10  Wrist flex./ext. #1  3x10 #1  3x10 #2  3x10 #2  3x10 #2  3x10   P/S A/AAROM red  2x10 Red  3x10 Red  3x10 SM 3x10  SM  3x10 SM  3x10 Y  3x10  R  3x10   Foam roll stretch flex/ext      2x10       Finklestein stretch    x10 x10 x10  x10     Wrist circles Marbles 2 m Marbles 2 m Marbles  2m Marbles 2m Marbles 1.5 m R and 1.5 m L        Powerweb   BPW 2x30 Blue PW 2x30 BPW  2x30 RPW 2x30 BPW 2x30 RPW 2x30 RPW 2x30 GPW 2x30 GPW  2x30 GPW  3x10   Powerweb  simulating opening jar/wrist radial deviation     YPW  3x10    GPW  10x3  Wrist/forearm stabilization with little oscillations GPW 3x10                 Ther Activity             Peg  w. supination   x40     x30      Pinch pins  Blue  3 sets  Blue 5 sets, varying grasps R 3 sets G  3sets G  3 sets R 3sets R 3 sets G 3 sets Green 5 sets Blue  3x10   Gait Training                                       Modalities             MH R 8m 8m 8m 8m 8m 6m 6m 6m 6m 6m   CP post 5m 5m 5m 5m 5m 5m 5m 5m 5m

## 2024-10-29 ENCOUNTER — OFFICE VISIT (OUTPATIENT)
Dept: OCCUPATIONAL THERAPY | Facility: CLINIC | Age: 39
End: 2024-10-29
Payer: OTHER MISCELLANEOUS

## 2024-10-29 DIAGNOSIS — S69.81XD INJURY OF TRIANGULAR FIBROCARTILAGE COMPLEX (TFCC) OF RIGHT WRIST, SUBSEQUENT ENCOUNTER: Primary | ICD-10-CM

## 2024-10-29 PROCEDURE — 97110 THERAPEUTIC EXERCISES: CPT | Performed by: OCCUPATIONAL THERAPIST

## 2024-10-29 PROCEDURE — 97140 MANUAL THERAPY 1/> REGIONS: CPT | Performed by: OCCUPATIONAL THERAPIST

## 2024-10-29 NOTE — PROGRESS NOTES
Daily Note     Today's date: 10/29/2024  Patient name: Juhi Tena  : 1985  MRN: [id number]  Referring provider: Josue Barker PA-C  Dx:   Encounter Diagnosis     ICD-10-CM    1. 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 Patient  has DRUJ and ECU discomfort . Mild tenderness with palpation DW.       Plan: Continue per plan of care.       Precautions: universal   CO-MORBIDITIES:  HEP ACCESS CODE:   FOTO Completed On:     POC Expires Reeval for Medicare to be completed Unit LImit Auth Expiration Date PT/OT/STVisit Limit      By visit na na NA NA    Completed on visit                Insert 3 rows at top of treatment diary - top left boxes should look like the example below       TREATMENT DIARY  Auth Status   26               Visit #   Visits Remaining 4         Precautions: ROM only, Sx 24  F/u with MD 24    Manuals 10/1 10/15 10/17 10/21 10/23 10/29 9/16 9/23 9/25 9/30   TPR ext mass 2m 2m    2m 2m 2m  2m   Scar mob 3m 2m 4m 4m 4m 3m 3m 3m 2m 2m   retrograde 3m 3m 4m 4m 4m 3m 3m 3m 3m 3m   Graston R 4m 4m 4m 4m 4m 4m 4m 4m 4m 4m   Neuro Re-Ed             KT       DW       Wrist A/AAROM, TGEs             Yoke splint for R rng sag band                                                                   Ther Ex             Wrist A/AAROM 2#  3x10 2#  3x10 2#  3x10 2#   3x10 2#  3x10  Wrist radial and ulnar deviation    2#  3x10  Wrist flex./ext. 2#    3x10 #1  3x10 #2  3x10 #2  3x10 #2  3x10   P/S A/AAROM red  2x10 Red  3x10 Red  3x10 SM 3x10  SM  3x10 SM  3x10 Y  3x10  R  3x10   Foam roll stretch flex/ext      2x10       Finklestein stretch    x10 x10 x10  x10     Wrist circles Marbles 2 m Marbles 2 m Marbles  2m Marbles 2m Marbles 1.5 m R and 1.5 m L MARBLES   2M       Powerweb   BPW 2x30 Blue PW 2x30 BPW  2x30  RPW 2x30 BPW 2x30 BPW 2x30 RPW 2x30 GPW 2x30 GPW  2x30 GPW  3x10   Powerweb  simulating opening jar/wrist radial deviation     YPW  3x10 YPW  3x10   GPW  10x3  Wrist/forearm stabilization with little oscillations GPW 3x10                Ther Activity             Peg  w. supination   x40     x30      Pinch pins  Blue  3 sets  Blue 5 sets, varying grasps R 3 sets G  3sets G  3 sets G  3sets R 3 sets G 3 sets Green 5 sets Blue  3x10   Gait Training                                       Modalities             MH R 8m 8m 8m 8m 8m 6m 6m 6m 6m 6m   CP post 5m 5m 5m 5m 5m 5m 5m 5m 5m

## 2024-11-01 ENCOUNTER — OFFICE VISIT (OUTPATIENT)
Dept: OCCUPATIONAL THERAPY | Facility: CLINIC | Age: 39
End: 2024-11-01
Payer: OTHER MISCELLANEOUS

## 2024-11-01 DIAGNOSIS — S69.81XD INJURY OF TRIANGULAR FIBROCARTILAGE COMPLEX (TFCC) OF RIGHT WRIST, SUBSEQUENT ENCOUNTER: Primary | ICD-10-CM

## 2024-11-01 PROCEDURE — 97140 MANUAL THERAPY 1/> REGIONS: CPT | Performed by: OCCUPATIONAL THERAPIST

## 2024-11-01 PROCEDURE — 97110 THERAPEUTIC EXERCISES: CPT | Performed by: OCCUPATIONAL THERAPIST

## 2024-11-01 NOTE — PROGRESS NOTES
Daily Note     Today's date: 2024  Patient name: Juhi Tena  : 1985  MRN: [id number]  Referring provider: Josue Barker PA-C  Dx:   Encounter Diagnosis     ICD-10-CM    1. 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 Patient  has improved pain . Strength is progressing   trigger points in ext and flexor mass. Instructed in STM at home      Plan: Continue per plan of care.      Precautions: ROM only, Sx 24  F/u with MD 24       CO-MORBIDITIES:  HEP ACCESS CODE:   FOTO Completed On:     POC Expires Reeval for Medicare to be completed Unit LImit Auth Expiration Date PT/OT/STVisit Limit     By visit 27  NA   na    Completed on visit                Insert 3 rows at top of treatment diary - top left boxes should look like the example below       TREATMENT DIARY  Auth Status                   Visit # 27   Visits Remaining       Manuals 10/1 10/15 10/17 10/21 10/23 10/29 11/1 9/23 9/25 9/30   TPR ext mass 2m 2m    2m 2m 2m  2m   Scar mob 3m 2m 4m 4m 4m 3m 3m 3m 2m 2m   retrograde 3m 3m 4m 4m 4m 3m 3m 3m 3m 3m   Graston R 4m 4m 4m 4m 4m 4m 4m 4m 4m 4m   Neuro Re-Ed             KT       DW       Wrist A/AAROM, TGEs             Yoke splint for R rng sag band                                                                   Ther Ex             Wrist A/AAROM 2#  3x10 2#  3x10 2#  3x10 2#   3x10 2#  3x10  Wrist radial and ulnar deviation    2#  3x10  Wrist flex./ext. 2#    3x10 2#  3x10 #2  3x10 #2  3x10 #2  3x10   P/S A/AAROM red  2x10 Red  3x10 Red  3x10 SM 3x10  SM  3x10 SM  3x10 Y  3x10  R  3x10   Foam roll stretch flex/ext      2x10 2x10      Finklestein stretch    x10 x10 x10  x10     Wrist circles Marbles 2 m Marbles 2 m Marbles  2m Marbles 2m Marbles 1.5 m R and 1.5 m L MARBLES   2M Marbles  2m       Powerweb   BPW 2x30 Blue PW 2x30 BPW  2x30 RPW 2x30 BPW 2x30 BPW 2x30 BPW 2x30 GPW 2x30 GPW  2x30 GPW  3x10   Powerweb  simulating opening jar/wrist radial deviation     YPW  3x10 YPW  3x10 Y FB    3x10  GPW  10x3  Wrist/forearm stabilization with little oscillations GPW 3x10                Ther Activity             Peg  w. supination   x40     x30      Pinch pins  Blue  3 sets  Blue 5 sets, varying grasps R 3 sets G  3sets G  3 sets G  3sets R 3 sets G 3 sets Green 5 sets Blue  3x10   Gait Training                                       Modalities             MH R 8m 8m 8m 8m 8m 6m 6m 6m 6m 6m   CP post 5m 5m 5m 5m 5m 5m 5m 5m 5m

## 2024-11-06 ENCOUNTER — OFFICE VISIT (OUTPATIENT)
Dept: OCCUPATIONAL THERAPY | Facility: CLINIC | Age: 39
End: 2024-11-06
Payer: OTHER MISCELLANEOUS

## 2024-11-06 DIAGNOSIS — S69.81XD INJURY OF TRIANGULAR FIBROCARTILAGE COMPLEX (TFCC) OF RIGHT WRIST, SUBSEQUENT ENCOUNTER: Primary | ICD-10-CM

## 2024-11-06 PROCEDURE — 97140 MANUAL THERAPY 1/> REGIONS: CPT | Performed by: OCCUPATIONAL THERAPIST

## 2024-11-06 PROCEDURE — 97110 THERAPEUTIC EXERCISES: CPT | Performed by: OCCUPATIONAL THERAPIST

## 2024-11-06 NOTE — PROGRESS NOTES
Daily Note     Today's date: 2024  Patient name: Juhi Tena  : 1985  MRN: [id number]  Referring provider: Josue Barker PA-C  Dx:   Encounter Diagnosis     ICD-10-CM    1. 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 Followed up with pt and there is still no clarification with going back to work yet. Communicated to pt to speak with doctor for follow up.      Objective: See treatment diary below      Assessment: Tolerated treatment well. Patient  has improved pain. Strength is progressing   trigger points in ext and flexor mass. Instructed pt on foam roll stretches at home and finkelstein stretches at home.      Plan: Continue per plan of care.      Precautions: ROM only, Sx 24  F/u with MD 24       CO-MORBIDITIES:  HEP ACCESS CODE:   FOTO Completed On:     POC Expires Reeval for Medicare to be completed Unit LImit Auth Expiration Date PT/OT/STVisit Limit     By visit 27  NA   na    Completed on visit                Insert 3 rows at top of treatment diary - top left boxes should look like the example below       TREATMENT DIARY  Auth Status                   Visit # 27   Visits Remaining       Manuals 10/1 10/15 10/17 10/21 10/23 10/29 11/1 11/6 9/25 9/30   TPR ext mass 2m 2m    2m 2m 2m  2m   Scar mob 3m 2m 4m 4m 4m 3m 3m 2m 2m 2m   retrograde 3m 3m 4m 4m 4m 3m 3m 3m 3m 3m   Graston R 4m 4m 4m 4m 4m 4m 4m 4m 4m 4m   Neuro Re-Ed             KT       DW       Wrist A/AAROM, TGEs             Yoke splint for R rng sag band                                                                   Ther Ex             Wrist A/AAROM 2#  3x10 2#  3x10 2#  3x10 2#   3x10 2#  3x10  Wrist radial and ulnar deviation    2#  3x10  Wrist flex./ext. 2#    3x10 2#  3x10 #2  3x10 #2  3x10 #2  3x10   P/S A/AAROM red  2x10 Red  3x10 Red  3x10 SM 3x10  SM  3x10 SM  3x10 SM  3x10  R  3x10   Foam roll  stretch flex/ext      2x10 2x10 2x10     Finklestein stretch    x10 x10 x10  x10     Wrist circles Marbles 2 m Marbles 2 m Marbles  2m Marbles 2m Marbles 1.5 m R and 1.5 m L MARBLES   2M Marbles  2m Marbles 2m     Powerweb   BPW 2x30 Blue PW 2x30 BPW  2x30 RPW 2x30 BPW 2x30 BPW 2x30 BPW 2x30 BlackPW 3x10 GPW  2x30 GPW  3x10   Powerweb  simulating opening jar/wrist radial deviation     YPW  3x10 YPW  3x10 YPW    3x10 YPW  3x10 GPW  10x3  Wrist/forearm stabilization with little oscillations GPW 3x10                Ther Activity             Peg  w. supination   x40           Pinch pins  Blue  3 sets  Blue 5 sets, varying grasps R 3 sets G  3sets G  3 sets G  3sets R 3 sets G 3 sets, varying grasps Green 5 sets Blue  3x10   Gait Training                                       Modalities             MH R 8m 8m 8m 8m 8m 6m 6m 8m 6m 6m   CP post 5m 5m 5m 5m 5m 5m 5m  5m

## 2024-11-08 ENCOUNTER — OFFICE VISIT (OUTPATIENT)
Dept: OCCUPATIONAL THERAPY | Facility: CLINIC | Age: 39
End: 2024-11-08
Payer: OTHER MISCELLANEOUS

## 2024-11-08 DIAGNOSIS — S69.81XD INJURY OF TRIANGULAR FIBROCARTILAGE COMPLEX (TFCC) OF RIGHT WRIST, SUBSEQUENT ENCOUNTER: Primary | ICD-10-CM

## 2024-11-08 PROCEDURE — 97140 MANUAL THERAPY 1/> REGIONS: CPT | Performed by: OCCUPATIONAL THERAPIST

## 2024-11-08 PROCEDURE — 97110 THERAPEUTIC EXERCISES: CPT | Performed by: OCCUPATIONAL THERAPIST

## 2024-11-08 NOTE — PROGRESS NOTES
Daily Note     Today's date: 2024  Patient name: Juhi Tena  : 1985  MRN: [id number]  Referring provider: Josue Barker PA-C  Dx:   Encounter Diagnosis     ICD-10-CM    1. 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 Patient is making slow progress . She gets relief  with taping. We discussed taping at night for pain  difficulty with UBE  .     Plan:  REEVAL      Precautions: ROM only, Sx 24  F/u with MD 24       CO-MORBIDITIES:  HEP ACCESS CODE:   FOTO Completed On:     POC Expires Reeval for Medicare to be completed Unit LImit Auth Expiration Date PT/OT/STVisit Limit     By visit 27  NA   na    Completed on visit                Insert 3 rows at top of treatment diary - top left boxes should look like the example below       TREATMENT DIARY  Auth Status                   Visit # 29   Visits Remaining 1       Manuals 10/1 10/15 10/17  RE 10/21 10/23 10/29 11/1 11/6 11/8 9/30   TPR ext mass 2m 2m    2m 2m 2m  2m   Scar mob 3m 2m 4m 4m 4m 3m 3m 2m 2m 2m   retrograde 3m 3m 4m 4m 4m 3m 3m 3m 3m 3m   Graston R 4m 4m 4m 4m 4m 4m 4m 4m 4m 4m   Neuro Re-Ed             KT       DW       Wrist A/AAROM, TGEs             Yoke splint for R rng sag band                                                                   Ther Ex             Wrist A/AAROM 2#  3x10 2#  3x10 2#  3x10 2#   3x10 2#  3x10  Wrist radial and ulnar deviation    2#  3x10  Wrist flex./ext. 2#    3x10 2#  3x10 #2  3x10 #2  3x10 #2  3x10   P/S A/AAROM red  2x10 Red  3x10 Red  3x10 SM 3x10  SM  3x10 SM  3x10 SM  3x10 Sm  3x10 R  3x10   Foam roll stretch flex/ext      2x10 2x10 2x10     Finklestein stretch    x10 x10 x10  x10     Wrist circles Marbles 2 m Marbles 2 m Marbles  2m Marbles 2m Marbles 1.5 m R and 1.5 m L MARBLES    2M Marbles  2m Marbles 2m Marbles  2m    Powerweb   BPW 2x30 Blue PW 2x30 BPW  2x30 RPW 2x30 BPW 2x30 BPW 2x30 BPW 2x30 BlackPW 3x10 BPW  5x10 BPW  3x10   Powerweb  simulating opening jar/wrist radial deviation     YPW  3x10 YPW  3x10 YPW    3x10 YPW  3x10 GPW  10x3  Wrist/forearm stabilization with little oscillations GPW 3x10                  Ther Activity             Peg  w. supination   x40           Pinch pins  Blue  3 sets  Blue 5 sets, varying grasps R 3 sets G  3sets G  3 sets G  3sets R 3 sets G 3 sets, varying grasps Blue   5 sets Blue  3x10    Work sim lift  B          11#  3x10    UBE          5m                 Modalities             MH R 8m 8m 8m 8m 8m 6m 6m 8m 6m 6m   CP post 5m 5m 5m 5m 5m 5m 5m  5m

## 2024-11-11 ENCOUNTER — OFFICE VISIT (OUTPATIENT)
Dept: OCCUPATIONAL THERAPY | Facility: CLINIC | Age: 39
End: 2024-11-11
Payer: OTHER MISCELLANEOUS

## 2024-11-11 DIAGNOSIS — S69.81XD INJURY OF TRIANGULAR FIBROCARTILAGE COMPLEX (TFCC) OF RIGHT WRIST, SUBSEQUENT ENCOUNTER: Primary | ICD-10-CM

## 2024-11-11 PROCEDURE — 97140 MANUAL THERAPY 1/> REGIONS: CPT | Performed by: OCCUPATIONAL THERAPIST

## 2024-11-11 PROCEDURE — 97110 THERAPEUTIC EXERCISES: CPT | Performed by: OCCUPATIONAL THERAPIST

## 2024-11-11 NOTE — PROGRESS NOTES
Daily Note     Today's date: 2024  Patient name: Juhi Tena  : 1985  MRN: [id number]  Referring provider: Josue Barker PA-C  Dx:   Encounter Diagnosis     ICD-10-CM    1. 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 My dog jumped on my hand and bent it       Objective: See treatment diary below                  Assessment  Impairments: abnormal or restricted ROM,  pain with function  Functional limitations: Pt. has limited ADLs due to non use of R hand.  Symptom irritability: moderate    Assessment details: Pt. Presents today for evaluation of the R wrist s/p surgery.  Pt. Has moderated edema and pain with hand use.  She is limited with her R wrist ROM and mobility.  She is unable to lift with her R hand with ADLs.  Pt. Is wearing a wrist support at all times.  Pt. Is out of work currently.  Pt. To benefit from skilled hand therapy to improve her ROM and progress to strength in order to return to work and baseline function.    24- Juhi has increased ROM of her wrist and forearm . She has improved pain level .  and pinch strength  have improved . She is compliant with tx  and tx.   Understanding of Dx/Px/POC: good    10/17/24- Nadege has improved ROM of R wrist and forearm . She has improved pain . Strength is progressing . She has mild ADL impairment.    24- Juhi has improved wrist ROM . She has improved  strength . Function is progressing slowly .     Prognosis: good    Goals  STG( 6  visits)  1. Compliant with HEP/splint- met  2. Reduce pain to less than 8/10 with ROM- met  3.. Increase R wrist ROM by 10 degrees for improved function-met  LTG( 12 visits or discharge)  1 R wrist AROM WNLs all planes for function  2. R /pinch strength WFLs for RTW  3. Improve FOTO score to predicted outcome or greeater.    Plan  Patient would benefit from: skilled occupational therapy  Planned modality interventions: cryotherapy and thermotherapy: hydrocollator packs    Planned therapy interventions: IASTM, joint mobilization, manual therapy, home exercise program, graded exercise, therapeutic exercise, therapeutic activities, functional ROM exercises, fine motor coordination training and orthotic fitting/training    Frequency: 2x week  Duration in weeks: 6  Plan of Care beginning date: 2024  Plan of Care expiration date:2024  Treatment plan discussed with: patient        Subjective Evaluation    History of Present Illness  Mechanism of injury: surgery  Mechanism of injury: S/P Right wrist arthroscopy with TFCC Repair and partial synovectomy - Right, Right de Quervain's release - Right, Application sugar tong splint - Right, and Excision ganglion cyst of the extensor tendon sheath first compartment on 2024  Quality of life: good    Patient Goals  Patient goals for therapy: decreased pain, increased motion, return to work and increased strength    Pain  Current pain ratin  At best pain ratin  At worst pain ratin previous  8  Quality: tight  Aggravating factors: lifting and nothing (ROM)    Social Support  Lives with: spouse    Employment status: working ([employer])  Hand dominance: ambidextrous    Treatments  Current treatment: occupational therapy        Objective     Tenderness     Right Wrist/Hand   Tenderness in the first dorsal compartment.     Additional Tenderness Details  TTP over incision    Neurological Testing     Sensation     Wrist/Hand     Right   Intact: light  touch    Active Range of Motion     Left Elbow   Forearm supination: 70 previous 50 degrees   Forearm pronation: 74 previous  55 degrees     Right Wrist   Wrist flexion:68 previous 15 degrees    Wrist extension: 68 previous 40 degrees with pain  Radial deviation:20 previous  15    Ulnar deviation: 40 previous 15 degrees with pain    Right Thumb   Opposition: Full oppositon         Strength/Myotome Testing     Left Wrist/Hand      (2nd hand position)     Trial 1: 30    Thumb Strength  Key/Lateral Pinch     Trial 1: 12  Tip/Two-Point Pinch     Trial 1: 8  Palmar/Three-Point Pinch     Trial 1: 15    Right Wrist/Hand   Wrist extension:5 previous 4  Wrist flexion:5 previous  4  Radial deviation:5 previous  4  Ulnar deviation:previous  4     (2nd hand position)     Trial 1: 30 previous 20    Thumb Strength   Key/Lateral Pinch     Trial 1: 9 pevious 0  Tip/Two-Point Pinch     Trial 1:7 previous  0  Palmar/Three-Point Pinch     Trial 1:7 previous  0    Swelling     Left Wrist/Hand   Circumference wrist: 15cm    Right Wrist/Hand   Circumference wrist: 15.2      Patient Juhi Tena :1985, female, PT PHONE:[phone]  Arrived at   Chief Complaint: No chief complaint on file.                              Plan: Continue per plan of care.      Precautions:   Sx 24  F/u with MD 24       CO-MORBIDITIES:  HEP ACCESS CODE:   FOTO Completed On:     POC Expires Reeval for Medicare to be completed Unit LImit Auth Expiration Date PT/OT/STVisit Limit     By visit 27  NA   na    Completed on visit                Insert 3 rows at top of treatment diary - top left boxes should look like the example below       TREATMENT DIARY  Auth Status                   Visit # 30   Visits Remaining 0       Manuals 10/1 10/15 10/17  RE 10/21 10/23 10/29 11/1 11/6 11/8 11/11  RE   TPR ext mass 2m 2m    2m 2m 2m  2m   Scar mob 3m 2m 4m 4m 4m 3m 3m 2m 2m 2m   retrograde 3m 3m 4m 4m 4m 3m 3m 3m 3m 3m   Graston R 4m 4m 4m 4m  4m 4m 4m 4m 4m 4m   Neuro Re-Ed             KT       DW       Wrist A/AAROM, TGEs             Yoke splint for R rng sag band                                                                   Ther Ex             Wrist A/AAROM 2#  3x10 2#  3x10 2#  3x10 2#   3x10 2#  3x10  Wrist radial and ulnar deviation    2#  3x10  Wrist flex./ext. 2#    3x10 2#  3x10 #2  3x10 #2  3x10 3#  3x10   P/S A/AAROM red  2x10 Red  3x10 Red  3x10 SM 3x10  SM  3x10 SM  3x10 SM  3x10 Sm  3x10 R  3x10   Foam roll stretch flex/ext      2x10 2x10 2x10     Finklestein stretch    x10 x10 x10  x10     Wrist circles Marbles 2 m Marbles 2 m Marbles  2m Marbles 2m Marbles 1.5 m R and 1.5 m L MARBLES   2M Marbles  2m Marbles 2m Marbles  2m 2.2#  ball  2m    Powerweb   BPW 2x30 Blue PW 2x30 BPW  2x30 RPW 2x30 BPW 2x30 BPW 2x30 BPW 2x30 BlackPW 3x10 BPW  5x10 BPW  3x10   Powerweb  simulating opening jar/wrist radial deviation     YPW  3x10 YPW  3x10 YPW    3x10 YPW  3x10 GPW  10x3  Wrist/forearm stabilization with little oscillations GPW 3x10                  Ther Activity             Peg  w. supination   x40           Pinch pins  Blue  3 sets  Blue 5 sets, varying grasps R 3 sets G  3sets G  3 sets G  3sets R 3 sets G 3 sets, varying grasps Blue   5 sets Blue  3x10    Work sim lift  B          11#  2x10 11#  2x10   UBE          5m 5m                Modalities             MH R 8m 8m 8m 8m 8m 6m 6m 8m 6m 6m   CP post 5m 5m 5m 5m 5m 5m 5m  5m

## 2024-11-14 ENCOUNTER — OFFICE VISIT (OUTPATIENT)
Dept: OCCUPATIONAL THERAPY | Facility: CLINIC | Age: 39
End: 2024-11-14
Payer: OTHER MISCELLANEOUS

## 2024-11-14 DIAGNOSIS — S69.81XD INJURY OF TRIANGULAR FIBROCARTILAGE COMPLEX (TFCC) OF RIGHT WRIST, SUBSEQUENT ENCOUNTER: Primary | ICD-10-CM

## 2024-11-14 PROCEDURE — 97110 THERAPEUTIC EXERCISES: CPT | Performed by: OCCUPATIONAL THERAPIST

## 2024-11-14 PROCEDURE — 97140 MANUAL THERAPY 1/> REGIONS: CPT | Performed by: OCCUPATIONAL THERAPIST

## 2024-11-14 NOTE — PROGRESS NOTES
Daily Note     Today's date: 2024  Patient name: Juhi Tena  : 1985  MRN: [id number]  Referring provider: Josue Barker PA-C  Dx:   Encounter Diagnosis     ICD-10-CM    1. 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 My dog jumped on my hand and bent it       Objective: See treatment diary below     Assessment: Pt ronel tx well. Pt denied anymore pain than usual; c/o soreness to R wrist and occ sharp pain at wrist due to stitch. Pt required rest breaks, able to start exercises with upgraded weight of 3# but needed to finish with 2#. Pt reported yoke splint for RF extensor sublux breaking; therapist fabricated a new one- pt reported comfort and independence with stacey/doff.     Plan: Continue per plan of care.      Precautions:   Sx 24  F/u with MD 24       CO-MORBIDITIES:  HEP ACCESS CODE:   FOTO Completed On:     POC Expires Reeval for Medicare to be completed Unit LImit Auth Expiration Date PT/OT/STVisit Limit     By visit 27  NA   na    Completed on visit                Insert 3 rows at top of treatment diary - top left boxes should look like the example below       TREATMENT DIARY  Auth Status                   Visit # 30   Visits Remaining 0       Manuals 11/14 10/15 10/17  RE 10/21 10/23 10/29 11/1 11/6 11/8 11/11  RE   TPR ext mass 2m 2m    2m 2m 2m  2m   Scar mob 2m 2m 4m 4m 4m 3m 3m 2m 2m 2m    retrograde 3m 3m 4m 4m 4m 3m 3m 3m 3m 3m   Graston R 4m 4m 4m 4m 4m 4m 4m 4m 4m 4m   Neuro Re-Ed             KT       DW       Wrist A/AAROM, TGEs             Yoke splint for R rng sag band                                                                   Ther Ex             Wrist A/AAROM 3# and 2#  3x10 2#  3x10 2#  3x10 2#   3x10 2#  3x10  Wrist radial and ulnar deviation    2#  3x10  Wrist flex./ext. 2#    3x10 2#  3x10 #2  3x10 #2  3x10 3#  3x10   P/S A/AAROM red  2x10 Red  3x10 Red  3x10 SM 3x10  SM  3x10 SM  3x10 SM  3x10 Sm  3x10 R  3x10   Foam roll stretch flex/ext      2x10 2x10 2x10     Finklestein stretch    x10 x10 x10  x10     Wrist circles 2.2# ball 2m Marbles 2 m Marbles  2m Marbles 2m Marbles 1.5 m R and 1.5 m L MARBLES   2M Marbles  2m Marbles 2m Marbles  2m 2.2#  ball  2m    Powerweb   BPW 3x10 Blue PW 2x30 BPW  2x30 RPW 2x30 BPW 2x30 BPW 2x30 BPW 2x30 BlackPW 3x10 BPW  5x10 BPW  3x10   Powerweb  simulating opening jar/wrist radial deviation GPW 3x10    YPW  3x10 YPW  3x10 YPW    3x10 YPW  3x10 GPW  10x3  Wrist/forearm stabilization with little oscillations GPW 3x10                  Ther Activity             Peg  w. supination   x40           Pinch pins  Blue  3x10 Blue 5 sets, varying grasps R 3 sets G  3sets G  3 sets G  3sets R 3 sets G 3 sets, varying grasps Blue   5 sets Blue  3x10    Work sim lift  B  11# 2x10        11#  2x10 11#  2x10   UBE  5m        5m 5m                Modalities             MH R 8m 8m 8m 8m 8m 6m 6m 8m 6m 6m   CP post Post tx 5m 5m 5m 5m 5m 5m  5m

## 2024-11-18 ENCOUNTER — OFFICE VISIT (OUTPATIENT)
Dept: OCCUPATIONAL THERAPY | Facility: CLINIC | Age: 39
End: 2024-11-18
Payer: OTHER MISCELLANEOUS

## 2024-11-18 DIAGNOSIS — S69.81XD INJURY OF TRIANGULAR FIBROCARTILAGE COMPLEX (TFCC) OF RIGHT WRIST, SUBSEQUENT ENCOUNTER: Primary | ICD-10-CM

## 2024-11-18 PROCEDURE — 97110 THERAPEUTIC EXERCISES: CPT | Performed by: OCCUPATIONAL THERAPIST

## 2024-11-18 PROCEDURE — 97140 MANUAL THERAPY 1/> REGIONS: CPT | Performed by: OCCUPATIONAL THERAPIST

## 2024-11-18 NOTE — PROGRESS NOTES
Daily Note     Today's date: 2024  Patient name: Juhi Tena  : 1985  MRN: [id number]  Referring provider: Josue Barker PA-C  Dx:   Encounter Diagnosis     ICD-10-CM    1. 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 I don't like pushing a door open         Objective: See treatment diary below      Assessment: Tolerated treatment well. Patient  is making slow gains with return to full activity .   We discussed elbow position to avoid ulnar paresthesias ,  . Scrub added to improve tolerance to wt bearing .     Plan: Continue per plan of care.      Precautions:   Sx 24  F/u with MD 24       CO-MORBIDITIES:  HEP ACCESS CODE:   FOTO Completed On:     POC Expires Reeval for Medicare to be completed Unit LImit Auth Expiration Date PT/OT/STVisit Limit     By visit 27  NA   na    Completed on visit               ows at top of treatment diary - top left boxes should look like the example below       TREATMENT DIARY  Auth Status                   Visit # 30   Visits Remaining 0       Manuals 11/14 11/18 10/17  RE 10/21 10/23 10/29 11/1 11/6 11/8 11/11  RE   TPR ext mass 2m 2m    2m 2m 2m  2m   Scar mob 2m 2m 4m 4m 4m 3m 3m 2m 2m 2m   retrograde 3m 3m 4m 4m 4m 3m 3m 3m 3m 3m   Graston R 4m 4m 4m 4m 4m 4m 4m 4m 4m 4m   Neuro Re-Ed             KT       DW       Wrist A/AAROM, TGEs             Yoke splint for R rng sag band                                                                   Ther Ex             Wrist A/AAROM 3# and 2#  3x10 3#  3x10 2#  3x10 2#   3x10 2#  3x10  Wrist radial and ulnar deviation    2#  3x10  Wrist flex./ext. 2#    3x10 2#  3x10 #2  3x10 #2  3x10 3#  3x10   P/S A/AAROM red  2x10 Red  3x10 Red  3x10 SM 3x10  SM  3x10 SM  3x10 SM  3x10 Sm  3x10 R  3x10   Foam roll stretch flex/ext      2x10 2x10 2x10     Finklestein stretch     x10 x10 x10  x10     Wrist circles 2.2# ball 2m Marbles 2 m 2.2#   ball  2m Marbles 2m Marbles 1.5 m R and 1.5 m L MARBLES   2M Marbles  2m Marbles 2m Marbles  2m 2.2#  ball  2m    Powerweb   BPW 3x10 Blue PW 2x30 BPW  2x30 RPW 2x30 BPW 2x30 BPW 2x30 BPW 2x30 BlackPW 3x10 BPW  5x10 BPW  3x10   Powerweb  simulating opening jar/wrist radial deviation GPW 3x10    YPW  3x10 YPW  3x10 YPW    3x10 YPW  3x10 GPW  10x3  Wrist/forearm stabilization with little oscillations GPW 3x10    Scrub   1m  50%            Ther Activity             Peg  w. supination   x40           Pinch pins  Blue  3x10 Blue 5 sets, varying grasps Blue 3 sets G  3sets G  3 sets G  3sets R 3 sets G 3 sets, varying grasps Blue   5 sets Blue  3x10    Work sim lift  B  11# 2x10 11#  3x10        11#  2x10 11#  2x10   UBE  5m 5m       5m 5m                Modalities             MH R 8m 8m 8m 8m 8m 6m 6m 8m 6m 6m   CP post Post tx 5m 5m 5m 5m 5m 5m  5m

## 2024-11-21 ENCOUNTER — OFFICE VISIT (OUTPATIENT)
Dept: OCCUPATIONAL THERAPY | Facility: CLINIC | Age: 39
End: 2024-11-21
Payer: OTHER MISCELLANEOUS

## 2024-11-21 DIAGNOSIS — S69.81XD INJURY OF TRIANGULAR FIBROCARTILAGE COMPLEX (TFCC) OF RIGHT WRIST, SUBSEQUENT ENCOUNTER: Primary | ICD-10-CM

## 2024-11-21 PROCEDURE — 97140 MANUAL THERAPY 1/> REGIONS: CPT | Performed by: OCCUPATIONAL THERAPIST

## 2024-11-21 PROCEDURE — 97110 THERAPEUTIC EXERCISES: CPT | Performed by: OCCUPATIONAL THERAPIST

## 2024-11-21 NOTE — PROGRESS NOTES
Daily Note     Today's date: 2024  Patient name: Juhi Tena  : 1985  MRN: [id number]  Referring provider: Josue Barker PA-C  Dx:   Encounter Diagnosis     ICD-10-CM    1. 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 Patient  has improved ronel to wt bearing       Plan: Continue per plan of care.      Precautions:   Sx 24  F/u with MD 24       CO-MORBIDITIES:  HEP ACCESS CODE:   FOTO Completed On:     POC Expires Reeval for Medicare to be completed Unit LImit Auth Expiration Date PT/OT/STVisit Limit     By visit 27  NA   na    Completed on visit               ows at top of treatment diary - top left boxes should look like the example below       TREATMENT DIARY  Auth Status                   Visit # 16   Visits Remaining 3           13          Manuals 11/14 11/18 11/21 10/21 10/23 10/29 11/1 11/6 11/8 11/11  RE   TPR ext mass 2m 2m    2m 2m 2m  2m   Scar mob 2m 2m 4m 4m 4m 3m 3m 2m 2m 2m   retrograde 3m 3m 4m 4m 4m 3m 3m 3m 3m 3m   Graston R 4m 4m 4m 4m 4m 4m 4m 4m 4m 4m   Neuro Re-Ed             KT       DW       Wrist A/AAROM, TGEs             Yoke splint for R rng sag band                                                                   Ther Ex             Wrist A/AAROM 3# and 2#  3x10 3#  3x10 3#  3x10 2#   3x10 2#  3x10  Wrist radial and ulnar deviation    2#  3x10  Wrist flex./ext. 2#    3x10 2#  3x10 #2  3x10 #2  3x10 3#  3x10   P/S A/AAROM red  2x10 Red  3x10 Red  3x10 SM 3x10  SM  3x10 SM  3x10 SM  3x10 Sm  3x10 R  3x10   Foam roll stretch flex/ext      2x10 2x10 2x10     Finklestein stretch    x10 x10 x10  x10     Wrist circles 2.2# ball 2m Marbles 2 m 2.2#   ball  2m Marbles 2m Marbles 1.5 m R and 1.5 m L MARBLES   2M Marbles  2m Marbles 2m Marbles  2m 2.2#  ball  2m     Powerweb   BPW 3x10 BPW  2x30 BPW  2x30 RPW 2x30 BPW 2x30 BPW 2x30 BPW 2x30 BlackPW 3x10 BPW  5x10 BPW  3x10   Powerweb  simulating opening jar/wrist radial deviation GPW 3x10    YPW  3x10 YPW  3x10 YPW    3x10 YPW  3x10 GPW  10x3  Wrist/forearm stabilization with little oscillations GPW 3x10    Scrub   1m  50%            Ther Activity             Peg  w. supination              Pinch pins  Blue  3x10 Blue 5 sets, varying grasps Black  1 set  Blue  2 sets    3sets G  3 sets G  3sets R 3 sets G 3 sets, varying grasps Blue   5 sets Blue  3x10    Work sim lift  B  11# 2x10 11#  3x10  11#  3x10      11#  2x10 11#  2x10   UBE  5m 5m       5m 5m                Modalities             MH R 8m 8m 8m 8m 8m 6m 6m 8m 6m 6m   CP post Post tx 5m 5m 5m 5m 5m 5m  5m

## 2024-11-25 ENCOUNTER — OFFICE VISIT (OUTPATIENT)
Dept: OCCUPATIONAL THERAPY | Facility: CLINIC | Age: 39
End: 2024-11-25
Payer: OTHER MISCELLANEOUS

## 2024-11-25 DIAGNOSIS — S69.81XD INJURY OF TRIANGULAR FIBROCARTILAGE COMPLEX (TFCC) OF RIGHT WRIST, SUBSEQUENT ENCOUNTER: Primary | ICD-10-CM

## 2024-11-25 PROCEDURE — 97110 THERAPEUTIC EXERCISES: CPT | Performed by: OCCUPATIONAL THERAPIST

## 2024-11-25 PROCEDURE — 97140 MANUAL THERAPY 1/> REGIONS: CPT | Performed by: OCCUPATIONAL THERAPIST

## 2024-11-25 NOTE — PROGRESS NOTES
Daily Note     Today's date: 2024  Patient name: Juhi Tena  : 1985  MRN: [id number]  Referring provider: Josue Barker PA-C  Dx:   Encounter Diagnosis     ICD-10-CM    1. 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 Patient  has continued  tenderness R  Ulnar wrist . She struggled with upgraded PRE      Plan: Continue per plan of care.      Precautions:   Sx 24  F/u with MD 24       CO-MORBIDITIES:  HEP ACCESS CODE:   FOTO Completed On:     POC Expires Reeval for Medicare to be completed Unit LImit Auth Expiration Date PT/OT/STVisit Limit     By visit 27  NA   na    Completed on visit               ows at top of treatment diary - top left boxes should look like the example below       TREATMENT DIARY  Auth Status                   Visit # 14   Visits Remaining 2           13 14         Manuals 11/14 11/18 11/21 11/25 10/23 10/29 11/1 11/6 11/8 11/11  RE   TPR ext mass 2m 2m    2m 2m 2m  2m   Scar mob 2m 2m 4m 4m 4m 3m 3m 2m 2m 2m   retrograde 3m 3m 4m 4m 4m 3m 3m 3m 3m 3m   Graston R 4m 4m 4m 4m 4m 4m 4m 4m 4m 4m   Neuro Re-Ed             KT       DW       Wrist A/AAROM, TGEs             Yoke splint for R rng sag band                                                                   Ther Ex             Wrist A/AAROM 3# and 2#  3x10 3#  3x10 3#  3x10 4#   3x10 2#  3x10  Wrist radial and ulnar deviation    2#  3x10  Wrist flex./ext. 2#    3x10 2#  3x10 #2  3x10 #2  3x10 3#  3x10   P/S A/AAROM red  2x10 Red  3x10 Red  3x10 SM 3x10  SM  3x10 SM  3x10 SM  3x10 Sm  3x10 R  3x10   Foam roll stretch flex/ext      2x10 2x10 2x10     Finklestein stretch    x10 x10 x10  x10     Wrist circles 2.2# ball 2m Marbles 2 m 2.2#   ball  2m Marbles 2m Marbles 1.5 m R and 1.5 m L MARBLES    2M Marbles  2m Marbles 2m Marbles  2m 2.2#  ball  2m    Powerweb   BPW 3x10 BPW  2x30 BPW  2x30 BPW 2x30 BPW 2x30 BPW 2x30 BPW 2x30 BlackPW 3x10 BPW  5x10 BPW  3x10   Powerweb  simulating opening jar/wrist radial deviation GPW 3x10    YPW  3x10 YPW  3x10 YPW    3x10 YPW  3x10 GPW  10x3  Wrist/forearm stabilization with little oscillations GPW 3x10    Scrub   1m  50%            Ther Activity             Peg  w. supination              Pinch pins  Blue  3x10 Blue 5 sets, varying grasps Black  1 set  Blue  2 sets  black  3sets G  3 sets G  3sets R 3 sets G 3 sets, varying grasps Blue   5 sets Blue  3x10    Work sim lift  B  11# 2x10 11#  3x10  11#  3x10      11#  2x10 11#  2x10   UBE  5m 5m       5m 5m                Modalities             MH R 8m 8m 8m 8m 8m 6m 6m 8m 6m 6m   CP post Post tx 5m 5m 5m 5m 5m 5m  5m

## 2024-11-29 ENCOUNTER — OFFICE VISIT (OUTPATIENT)
Dept: OCCUPATIONAL THERAPY | Facility: CLINIC | Age: 39
End: 2024-11-29
Payer: OTHER MISCELLANEOUS

## 2024-11-29 DIAGNOSIS — S69.81XD INJURY OF TRIANGULAR FIBROCARTILAGE COMPLEX (TFCC) OF RIGHT WRIST, SUBSEQUENT ENCOUNTER: Primary | ICD-10-CM

## 2024-11-29 PROCEDURE — 97110 THERAPEUTIC EXERCISES: CPT | Performed by: OCCUPATIONAL THERAPIST

## 2024-11-29 PROCEDURE — 97140 MANUAL THERAPY 1/> REGIONS: CPT | Performed by: OCCUPATIONAL THERAPIST

## 2024-11-29 NOTE — PROGRESS NOTES
Daily Note     Today's date: 2024  Patient name: Juhi Tena  : 1985  MRN: [id number]  Referring provider: Josue Barker PA-C  Dx:   Encounter Diagnosis     ICD-10-CM    1. 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      Objective: See treatment diary below      Assessment: Tolerated treatment fair. Patient continues with wrist pain.  Tender over the 1st DC.       Plan: Continue per plan of care.      Precautions:   Sx 24  F/u with MD 24       CO-MORBIDITIES:  HEP ACCESS CODE:   FOTO Completed On:     POC Expires Reeval for Medicare to be completed Unit LImit Auth Expiration Date PT/OT/STVisit Limit     By visit 27  NA   na    Completed on visit               ows at top of treatment diary - top left boxes should look like the example below       TREATMENT DIARY  Auth Status                   Visit # 14   Visits Remaining 2           13 14 15 AUTH       Manuals 11/14 11/18 11/21 11/25 11/29 10/29 11/1 11/6 11/8 11/11  RE   TPR ext mass 2m 2m    2m 2m 2m  2m   Scar mob 2m 2m 4m 4m 4m 3m 3m 2m 2m 2m   retrograde 3m 3m 4m 4m 4m 3m 3m 3m 3m 3m   Graston R 4m 4m 4m 4m 4m 4m 4m 4m 4m 4m   Neuro Re-Ed             KT       DW       Wrist A/AAROM, TGEs             Yoke splint for R rng sag band                                                                   Ther Ex             Wrist A/AAROM 3# and 2#  3x10 3#  3x10 3#  3x10 4#   3x10 #3 3x10 2#    3x10 2#  3x10 #2  3x10 #2  3x10 3#  3x10   P/S A/AAROM red  2x10 Red  3x10 Red  3x10 SM 3x10  SM  3x10 SM  3x10 SM  3x10 Sm  3x10 R  3x10   Foam roll stretch flex/ext      2x10 2x10 2x10     Finklestein stretch    x10 x10 x10  x10     Wrist circles 2.2# ball 2m Marbles 2 m 2.2#   ball  2m Marbles 2m 2.2# 2m MARBLES   2M Marbles  2m Marbles 2m Marbles  2m 2.2#  ball  2m    Powerweb   BPW 3x10 BPW  2x30 BPW  2x30 BPW 2x30 BPW 2x30  BPW 2x30 BPW 2x30 BlackPW 3x10 BPW  5x10 BPW  3x10   Powerweb  simulating opening jar/wrist radial deviation GPW 3x10     YPW  3x10 YPW    3x10 YPW  3x10 GPW  10x3  Wrist/forearm stabilization with little oscillations GPW 3x10    Scrub   1m  50%            Ther Activity             Peg  w. supination              Pinch pins  Blue  3x10 Blue 5 sets, varying grasps Black  1 set  Blue  2 sets  black  3sets Blue  3 sets G  3sets R 3 sets G 3 sets, varying grasps Blue   5 sets Blue  3x10    Work sim lift  B  11# 2x10 11#  3x10  11#  3x10  #11 3x10 ball    11#  2x10 11#  2x10   UBE  5m 5m   6m    5m 5m                Modalities             MH R 8m 8m 8m 8m 8m 6m 6m 8m 6m 6m   CP post Post tx 5m 5m 5m 5m 5m 5m  5m

## 2024-12-02 ENCOUNTER — OFFICE VISIT (OUTPATIENT)
Dept: OCCUPATIONAL THERAPY | Facility: CLINIC | Age: 39
End: 2024-12-02
Payer: OTHER MISCELLANEOUS

## 2024-12-02 DIAGNOSIS — S69.81XD INJURY OF TRIANGULAR FIBROCARTILAGE COMPLEX (TFCC) OF RIGHT WRIST, SUBSEQUENT ENCOUNTER: Primary | ICD-10-CM

## 2024-12-02 PROCEDURE — 97110 THERAPEUTIC EXERCISES: CPT | Performed by: OCCUPATIONAL THERAPIST

## 2024-12-02 PROCEDURE — 97140 MANUAL THERAPY 1/> REGIONS: CPT | Performed by: OCCUPATIONAL THERAPIST

## 2024-12-02 NOTE — PROGRESS NOTES
Daily Note     Today's date: 2024  Patient name: Juhi Tena  : 1985  MRN: [id number]  Referring provider: Josue Barker PA-C  Dx:   Encounter Diagnosis     ICD-10-CM    1. 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 Patient  has continued pain with use . She has trigger points  in the ext mass.       Plan: Continue per plan of care.      Precautions:   Sx 24  F/u with MD 24       CO-MORBIDITIES:  HEP ACCESS CODE:   FOTO Completed On:     POC Expires Reeval for Medicare to be completed Unit LImit Auth Expiration Date PT/OT/STVisit Limit     By visit 27  NA   na    Completed on visit               ows at top of treatment diary - top left boxes should look like the example below       TREATMENT DIARY  Auth Status                   Visit # 15   Visits Remaining 1           13 14 15 16       Manuals   RE   TPR ext mass 2m 2m    2m 2m 2m  2m   Scar mob 2m 2m 4m 4m 4m 3m 3m 2m 2m 2m   retrograde 3m 3m 4m 4m 4m 3m 3m 3m 3m 3m   Graston R 4m 4m 4m 4m 4m 4m 4m 4m 4m 4m   Neuro Re-Ed             KT       DW       Wrist A/AAROM, TGEs             Yoke splint for R rng sag band                                                                   Ther Ex             Wrist A/AAROM 3# and 2#  3x10 3#  3x10 3#  3x10 4#   3x10 #3 3x10 3#    3x10 2#  3x10 #2  3x10 #2  3x10 3#  3x10   P/S A/AAROM red  2x10 Red  3x10 Red  3x10 SM 3x10  SM  3x10 SM  3x10 SM  3x10 Sm  3x10 R  3x10   Foam roll stretch flex/ext      2x10 2x10 2x10     Finklestein stretch    x10 x10    x10     Wrist circles 2.2# ball 2m Marbles 2 m 2.2#   ball  2m Marbles 2m 2.2# 2m 2.2#  2m Marbles  2m Marbles 2m Marbles  2m 2.2#  ball  2m    Powerweb   BPW 3x10 BPW   2x30 BPW  2x30 BPW 2x30 BPW 2x30 BPW 2x30 BPW 2x30 BlackPW 3x10 BPW  5x10 BPW  3x10   Powerweb  simulating opening jar/wrist radial deviation GPW 3x10       YPW    3x10 YPW  3x10 GPW  10x3  Wrist/forearm stabilization with little oscillations GPW 3x10    Scrub   1m  50%            Ther Activity             Peg  w. supination              Pinch pins  Blue  3x10 Blue 5 sets, varying grasps Black  1 set  Blue  2 sets  black  3sets Blue  3 sets G  3sets R 3 sets G 3 sets, varying grasps Blue   5 sets Blue  3x10    Work sim lift  B  11# 2x10 11#  3x10  11#  3x10  #11 3x10 ball 11#  3x10  ball   11#  2x10 11#  2x10   UBE  5m 5m   6m    5m 5m                Modalities             MH R 8m 8m 8m 8m 8m 6m 6m 8m 6m 6m   CP post Post tx 5m 5m 5m 5m 5m 5m  5m

## 2024-12-04 ENCOUNTER — OFFICE VISIT (OUTPATIENT)
Dept: OCCUPATIONAL THERAPY | Facility: CLINIC | Age: 39
End: 2024-12-04
Payer: OTHER MISCELLANEOUS

## 2024-12-04 DIAGNOSIS — S69.81XD INJURY OF TRIANGULAR FIBROCARTILAGE COMPLEX (TFCC) OF RIGHT WRIST, SUBSEQUENT ENCOUNTER: Primary | ICD-10-CM

## 2024-12-04 PROCEDURE — 97110 THERAPEUTIC EXERCISES: CPT | Performed by: OCCUPATIONAL THERAPIST

## 2024-12-04 PROCEDURE — 97140 MANUAL THERAPY 1/> REGIONS: CPT | Performed by: OCCUPATIONAL THERAPIST

## 2024-12-04 NOTE — PROGRESS NOTES
Daily Note     Today's date: 2024  Patient name: Juhi Tena  : 1985  MRN: [id number]  Referring provider: Josue Barker PA-C  Dx:   Encounter Diagnosis     ICD-10-CM    1. 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 Pt reports she has been performing her HEP at home such as scrubbing.      Objective: See treatment diary below      Assessment: Tolerated treatment fair. Patient has trigger points in R ext mass benefiting from TPR. Pt will continue to benefit from OP OT.      Plan: Continue per plan of care.      Precautions:   Sx 24  F/u with MD 24       CO-MORBIDITIES:  HEP ACCESS CODE:   FOTO Completed On:     POC Expires Reeval for Medicare to be completed Unit LImit Auth Expiration Date PT/OT/STVisit Limit     By visit 27  NA   na    Completed on visit               ows at top of treatment diary - top left boxes should look like the example below       TREATMENT DIARY  Auth Status                   Visit # 15   Visits Remaining 1           13 14 15 16 17      Manuals   RE   TPR ext mass 2m 2m    2m 2m 2m  2m   Scar mob 2m 2m 4m 4m 4m 3m 3m 2m 2m 2m   retrograde 3m 3m 4m 4m 4m 3m 3m 3m 3m 3m   Graston R 4m 4m 4m 4m 4m 4m 4m 4m 4m 4m   Neuro Re-Ed             KT       DW       Wrist A/AAROM, TGEs             Yoke splint for R rng sag band                                                                   Ther Ex             Wrist A/AAROM 3# and 2#  3x10 3#  3x10 3#  3x10 4#   3x10 #3 3x10 3#    3x10 3#  3x10 #2  3x10 #2  3x10 3#  3x10   P/S A/AAROM red  2x10 Red  3x10 Red  3x10 SM 3x10  SM  3x10 red  2x10 SM  3x10 Sm  3x10 R  3x10   Foam roll stretch flex/ext        2x10     Finklestein stretch    x10 x10   x20 x10     Wrist circles 2.2# ball 2m Marbles 2 m 2.2#   ball  2m Marbles 2m 2.2# 2m 2.2#  2m 2.2#  2m  Marbles 2m Marbles  2m 2.2#  ball  2m    Powerweb   BPW 3x10 BPW  2x30 BPW  2x30 BPW 2x30 BPW 2x30 BPW 2x30 BPW 2x30 BlackPW 3x10 BPW  5x10 BPW  3x10   Powerweb  simulating opening jar/wrist radial deviation GPW 3x10        YPW  3x10 GPW  10x3  Wrist/forearm stabilization with little oscillations GPW 3x10    Scrub   1m  50%            Ther Activity             Peg  w. supination              Pinch pins  Blue  3x10 Blue 5 sets, varying grasps Black  1 set  Blue  2 sets  black  3sets Blue  3 sets G  3sets  G 3 sets, varying grasps Blue   5 sets Blue  3x10    Work sim lift  B  11# 2x10 11#  3x10  11#  3x10  #11 3x10 ball 11#  3x10  ball 11# ball  3x10  11#  2x10 11#  2x10   UBE  5m 5m   6m 6m 6m level 1  5m 5m                Modalities             MH R 8m 8m 8m 8m 8m 6m 6m 8m 6m 6m   CP post Post tx 5m 5m 5m 5m 5m 5m  5m

## 2024-12-09 ENCOUNTER — OFFICE VISIT (OUTPATIENT)
Dept: OCCUPATIONAL THERAPY | Facility: CLINIC | Age: 39
End: 2024-12-09
Payer: OTHER MISCELLANEOUS

## 2024-12-09 DIAGNOSIS — S69.81XD INJURY OF TRIANGULAR FIBROCARTILAGE COMPLEX (TFCC) OF RIGHT WRIST, SUBSEQUENT ENCOUNTER: Primary | ICD-10-CM

## 2024-12-09 PROCEDURE — 97110 THERAPEUTIC EXERCISES: CPT | Performed by: OCCUPATIONAL THERAPIST

## 2024-12-09 PROCEDURE — 97140 MANUAL THERAPY 1/> REGIONS: CPT | Performed by: OCCUPATIONAL THERAPIST

## 2024-12-11 ENCOUNTER — OFFICE VISIT (OUTPATIENT)
Dept: OCCUPATIONAL THERAPY | Facility: CLINIC | Age: 39
End: 2024-12-11
Payer: OTHER MISCELLANEOUS

## 2024-12-11 DIAGNOSIS — S69.81XD INJURY OF TRIANGULAR FIBROCARTILAGE COMPLEX (TFCC) OF RIGHT WRIST, SUBSEQUENT ENCOUNTER: Primary | ICD-10-CM

## 2024-12-11 PROCEDURE — 97110 THERAPEUTIC EXERCISES: CPT | Performed by: OCCUPATIONAL THERAPIST

## 2024-12-11 PROCEDURE — 97140 MANUAL THERAPY 1/> REGIONS: CPT | Performed by: OCCUPATIONAL THERAPIST

## 2024-12-11 NOTE — PROGRESS NOTES
Daily Note     Today's date: 2024  Patient name: Juhi Tena  : 1985  MRN: [id number]  Referring provider: Josue Barker PA-C  Dx:   Encounter Diagnosis     ICD-10-CM    1. 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      Objective: See treatment diary below      Assessment: Tolerated treatment fair. Patient continues with radial wrist pain/soreness, Subluxation of the RF extensor tendon has not changed, compliant with yoke splint.       Plan: Continue per plan of care.      Precautions:   Sx 24  F/u with MD 24       CO-MORBIDITIES:  HEP ACCESS CODE:   FOTO Completed On:     POC Expires Reeval for Medicare to be completed Unit LImit Auth Expiration Date PT/OT/STVisit Limit     By visit 27  NA   na    Completed on visit               ows at top of treatment diary - top left boxes should look like the example below       TREATMENT DIARY  Auth Status                   Visit # 15   Visits Remaining 1           13 14 15 16 17 18 19    Manuals   RE   TPR ext mass 2m 2m    2m 2m 2m  2m   Scar mob 2m 2m 4m 4m 4m 3m 3m 2m 2m 2m   retrograde 3m 3m 4m 4m 4m 3m 3m 3m 3m 3m   Graston R 4m 4m 4m 4m 4m 4m 4m 4m 4m 4m   Neuro Re-Ed             KT       DW       Wrist A/AAROM, TGEs             Yoke splint for R rng sag band                                                                   Ther Ex             Wrist A/AAROM 3# and 2#  3x10 3#  3x10 3#  3x10 4#   3x10 #3 3x10 3#    3x10 3#  3x10 #3  3x10 #3  3x10 3#  3x10   P/S A/AAROM red  2x10 Red  3x10 Red  3x10 SM 3x10  SM  3x10 red  2x10 LG  3x10 LG  3x10 R  3x10   Foam roll stretch flex/ext        2x10     Finklestein stretch    x10 x10   x20      Wrist circles 2.2# ball 2m Marbles 2 m 2.2#   ball  2m Marbles 2m 2.2# 2m 2.2#  2m 2.2#  2m 2.2#  2m 2.2#  2m 2.2#  ball  2m     Powerweb   BPW 3x10 BPW  2x30 BPW  2x30 BPW 2x30 BPW 2x30 BPW 2x30 BPW 2x30 BlackPW 3x10 BPW  5x10 BPW  3x10   Powerweb  simulating opening jar/wrist radial deviation GPW 3x10        YPW  3x10 GPW  10x3  Wrist/forearm stabilization with little oscillations GPW 3x10    Scrub   1m  50%            Ther Activity             Peg  w. supination              Pinch pins  Blue  3x10 Blue 5 sets, varying grasps Black  1 set  Blue  2 sets  black  3sets Blue  3 sets G  3sets  G 3 sets, varying grasps Blue   5 sets Blue  3x10    Work sim lift  B  11# 2x10 11#  3x10  11#  3x10  #11 3x10 ball 11#  3x10  ball 11# ball  3x10 #11 ball 3x10 11#  2x10 11#  2x10   UBE  5m 5m   6m 6m 6m level 1 6m level 2.5 6m 5m                Modalities             MH R 8m 8m 8m 8m 8m 6m 6m 8m 6m 6m   CP post Post tx 5m 5m 5m 5m 5m 5m 5m

## 2024-12-16 ENCOUNTER — OFFICE VISIT (OUTPATIENT)
Dept: OCCUPATIONAL THERAPY | Facility: CLINIC | Age: 39
End: 2024-12-16
Payer: OTHER MISCELLANEOUS

## 2024-12-16 DIAGNOSIS — S69.81XD INJURY OF TRIANGULAR FIBROCARTILAGE COMPLEX (TFCC) OF RIGHT WRIST, SUBSEQUENT ENCOUNTER: Primary | ICD-10-CM

## 2024-12-16 PROCEDURE — 97140 MANUAL THERAPY 1/> REGIONS: CPT | Performed by: OCCUPATIONAL THERAPIST

## 2024-12-16 PROCEDURE — 97110 THERAPEUTIC EXERCISES: CPT | Performed by: OCCUPATIONAL THERAPIST

## 2024-12-16 NOTE — PROGRESS NOTES
Daily Note     Today's date: 2024  Patient name: Juhi Tena  : 1985  MRN: [id number]  Referring provider: Josue Barker PA-C  Dx:   Encounter Diagnosis     ICD-10-CM    1. 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      Objective: See treatment diary below      Assessment: Tolerated treatment fair. Patient has continued pain over 1st dorsal compartment.      Plan: Continue per plan of care.      Precautions:   Sx 24  F/u with MD 24       CO-MORBIDITIES:  HEP ACCESS CODE:   FOTO Completed On:     POC Expires Reeval for Medicare to be completed Unit LImit Auth Expiration Date PT/OT/STVisit Limit     By visit 27  NA   na    Completed on visit               ows at top of treatment diary - top left boxes should look like the example below       TREATMENT DIARY  Auth Status                   Visit # 15   Visits Remaining 1           13 14 15 16 17 18 19 20   Manuals    TPR ext mass 2m 2m    2m 2m 2m  2m   Scar mob 2m 2m 4m 4m 4m 3m 3m 2m 2m 2m   retrograde 3m 3m 4m 4m 4m 3m 3m 3m 3m 3m   Graston R 4m 4m 4m 4m 4m 4m 4m 4m 4m 4m   Neuro Re-Ed             KT       DW       Wrist A/AAROM, TGEs             Yoke splint for R rng sag band                                                                   Ther Ex             Wrist A/AAROM 3# and 2#  3x10 3#  3x10 3#  3x10 4#   3x10 #3 3x10 3#    3x10 3#  3x10 #3  3x10 #3  3x10 3#  3x10   P/S A/AAROM red  2x10 Red  3x10 Red  3x10 SM 3x10  SM  3x10 red  2x10 LG  3x10 LG  3x10 R  3x10   Foam roll stretch flex/ext        2x10     Finklestein stretch    x10 x10   x20      Wrist circles 2.2# ball 2m Marbles 2 m 2.2#   ball  2m Marbles 2m 2.2# 2m 2.2#  2m 2.2#  2m 2.2#  2m 2.2#  2m 2.2#  ball  2m    Powerweb   BPW 3x10 BPW  2x30 BPW  2x30 BPW 2x30 BPW 2x30 BPW 2x30 BPW 2x30  BlackPW 3x10 BPW  5x10 BPW  3x10   Powerweb  simulating opening jar/wrist radial deviation GPW 3x10        YPW  3x10 GPW  10x3  Wrist/forearm stabilization with little oscillations GPW 3x10    Scrub   1m  50%            Ther Activity             Peg  w. supination              Pinch pins  Blue  3x10 Blue 5 sets, varying grasps Black  1 set  Blue  2 sets  black  3sets Blue  3 sets G  3sets  G 3 sets, varying grasps Blue   5 sets Blue  3x10    Work sim lift  B  11# 2x10 11#  3x10  11#  3x10  #11 3x10 ball 11#  3x10  ball 11# ball  3x10 #11 ball 3x10 11#  2x10 11#  2x10   UBE  5m 5m   6m 6m 6m level 1 6m level 2.5 6m 5m                Modalities             MH R 8m 8m 8m 8m 8m 6m 6m 8m 6m 6m   CP post Post tx 5m 5m 5m 5m 5m 5m 5m

## 2024-12-18 ENCOUNTER — OFFICE VISIT (OUTPATIENT)
Dept: OCCUPATIONAL THERAPY | Facility: CLINIC | Age: 39
End: 2024-12-18
Payer: OTHER MISCELLANEOUS

## 2024-12-18 DIAGNOSIS — S69.81XD INJURY OF TRIANGULAR FIBROCARTILAGE COMPLEX (TFCC) OF RIGHT WRIST, SUBSEQUENT ENCOUNTER: Primary | ICD-10-CM

## 2024-12-18 PROCEDURE — 97110 THERAPEUTIC EXERCISES: CPT | Performed by: OCCUPATIONAL THERAPIST

## 2024-12-18 PROCEDURE — 97140 MANUAL THERAPY 1/> REGIONS: CPT | Performed by: OCCUPATIONAL THERAPIST

## 2024-12-18 NOTE — PROGRESS NOTES
Daily Note     Today's date: 2024  Patient name: Juhi Tena  : 1985  MRN: [id number]  Referring provider: Josue Barker PA-C  Dx:   Encounter Diagnosis     ICD-10-CM    1. 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 Patient continues with mild edema in the dorsal side of the wrist.    Plan: Continue per plan of care.      Precautions:   Sx 24  F/u with MD 24       CO-MORBIDITIES:  HEP ACCESS CODE:   FOTO Completed On:     POC Expires Reeval for Medicare to be completed Unit LImit Auth Expiration Date PT/OT/STVisit Limit     By visit 27  NA   na    Completed on visit               ows at top of treatment diary - top left boxes should look like the example below       TREATMENT DIARY  Auth Status                   Visit # 15   Visits Remaining 1           20           Manuals            TPR ext mass            Scar mob 2m           retrograde 3m           Graston R 4m           Neuro Re-Ed            KT             Wrist A/AAROM, TGEs            Yoke splint for R rng sag band                                                             Ther Ex            Wrist A/AAROM 3# and 2#  3x10           P/S A/AAROM red  2x10           Foam roll stretch flex/ext            Finklestein stretch            Wrist circles 2.2# ball 2m           Powerweb   BPW 3x10           Powerweb  simulating opening jar/wrist radial deviation GPW 3x10            Scrub  1m 50%           Ther Activity            Peg  w. supination            Pinch pins  Blue  3x10            Work sim lift  B  11# 2x10           UBE  5m                       Modalities            MH R 8m           CP post Post tx                   13 14 15 16 17 18 19 20   Manuals     TPR ext mass 2m 2m    2m 2m 2m  2m   Scar mob 2m 2m 4m 4m 4m 3m 3m 2m 2m 2m   retrograde 3m 3m 4m 4m 4m 3m 3m 3m 3m 3m   Graston R 4m 4m 4m 4m 4m 4m 4m 4m 4m 4m   Neuro Re-Ed             KT       DW       Wrist A/AAROM, TGEs             Yoke splint for R rng sag band                                                                   Ther Ex             Wrist A/AAROM 3# and 2#  3x10 3#  3x10 3#  3x10 4#   3x10 #3 3x10 3#    3x10 3#  3x10 #3  3x10 #3  3x10 3#  3x10   P/S A/AAROM red  2x10 Red  3x10 Red  3x10 SM 3x10  SM  3x10 red  2x10 LG  3x10 LG  3x10 R  3x10   Foam roll stretch flex/ext        2x10     Finklestein stretch    x10 x10   x20      Wrist circles 2.2# ball 2m Marbles 2 m 2.2#   ball  2m Marbles 2m 2.2# 2m 2.2#  2m 2.2#  2m 2.2#  2m 2.2#  2m 2.2#  ball  2m    Powerweb   BPW 3x10 BPW  2x30 BPW  2x30 BPW 2x30 BPW 2x30 BPW 2x30 BPW 2x30 BlackPW 3x10 BPW  5x10 BPW  3x10   Powerweb  simulating opening jar/wrist radial deviation GPW 3x10        YPW  3x10 GPW  10x3  Wrist/forearm stabilization with little oscillations GPW 3x10    Scrub   1m  50%            Ther Activity             Peg  w. supination              Pinch pins  Blue  3x10 Blue 5 sets, varying grasps Black  1 set  Blue  2 sets  black  3sets Blue  3 sets G  3sets  G 3 sets, varying grasps Blue   5 sets Blue  3x10    Work sim lift  B  11# 2x10 11#  3x10  11#  3x10  #11 3x10 ball 11#  3x10  ball 11# ball  3x10 #11 ball 3x10 11#  2x10 11#  2x10   UBE  5m 5m   6m 6m 6m level 1 6m level 2.5 6m 5m                Modalities             MH R 8m 8m 8m 8m 8m 6m 6m 8m 6m 6m   CP post Post tx 5m 5m 5m 5m 5m 5m 5m

## 2024-12-23 ENCOUNTER — OFFICE VISIT (OUTPATIENT)
Dept: OCCUPATIONAL THERAPY | Facility: CLINIC | Age: 39
End: 2024-12-23
Payer: OTHER MISCELLANEOUS

## 2024-12-23 DIAGNOSIS — S69.81XD INJURY OF TRIANGULAR FIBROCARTILAGE COMPLEX (TFCC) OF RIGHT WRIST, SUBSEQUENT ENCOUNTER: Primary | ICD-10-CM

## 2024-12-23 PROCEDURE — 97110 THERAPEUTIC EXERCISES: CPT | Performed by: OCCUPATIONAL THERAPIST

## 2024-12-23 PROCEDURE — 97140 MANUAL THERAPY 1/> REGIONS: CPT | Performed by: OCCUPATIONAL THERAPIST

## 2024-12-23 NOTE — PROGRESS NOTES
Daily Note     Today's date: 2024  Patient name: Juhi Tena  : 1985  MRN: [id number]  Referring provider: Josue Barker PA-C  Dx:   Encounter Diagnosis     ICD-10-CM    1. 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      Objective: See treatment diary below      Assessment: Tolerated treatment fair. Patient radial wrist remains painful.      Plan: Continue per plan of care.      Precautions:   Sx 24  F/u with MD 24       CO-MORBIDITIES:  HEP ACCESS CODE:   FOTO Completed On:     POC Expires Reeval for Medicare to be completed Unit LImit Auth Expiration Date PT/OT/STVisit Limit     By visit 27  NA   na    Completed on visit               ows at top of treatment diary - top left boxes should look like the example below       TREATMENT DIARY  Auth Status                   Visit # 15   Visits Remaining 1           20 21 out of 24          Manuals           TPR ext mass            Scar mob 2m 2m          retrograde 3m 3m          Graston R 4m 4m          Neuro Re-Ed            KT             Wrist A/AAROM, TGEs            Yoke splint for R rng sag band                                                             Ther Ex            Wrist A/AAROM 3# and 2#  3x10 #3 3x10          P/S A/AAROM red  2x10 R 3x10          Foam roll stretch flex/ext            Finklestein stretch            Wrist circles 2.2# ball 2m 2.2# 2m          Powerweb   BPW 3x10 BPW 2x30          Powerweb  simulating opening jar/wrist radial deviation GPW 3x10            Scrub  1m 50% 1m 50%          Ther Activity            Peg  w. supination            Pinch pins  Blue  3x10 Blue 3 sets           Work sim lift  B  11# 2x10 #11 3x10          UBE  5m 5m                      Modalities            MH R 8m 8m          CP post Post tx 5m                  13 14 15 16 17 18 19 20   Manuals  11/14 11/18 11/21 11/25 11/29 12/2 12/4 12/9 12/11 12/16   TPR ext mass 2m 2m    2m 2m 2m  2m   Scar mob 2m 2m 4m 4m 4m 3m 3m 2m 2m 2m   retrograde 3m 3m 4m 4m 4m 3m 3m 3m 3m 3m   Graston R 4m 4m 4m 4m 4m 4m 4m 4m 4m 4m   Neuro Re-Ed             KT       DW       Wrist A/AAROM, TGEs             Yoke splint for R rng sag band                                                                   Ther Ex             Wrist A/AAROM 3# and 2#  3x10 3#  3x10 3#  3x10 4#   3x10 #3 3x10 3#    3x10 3#  3x10 #3  3x10 #3  3x10 3#  3x10   P/S A/AAROM red  2x10 Red  3x10 Red  3x10 SM 3x10  SM  3x10 red  2x10 LG  3x10 LG  3x10 R  3x10   Foam roll stretch flex/ext        2x10     Finklestein stretch    x10 x10   x20      Wrist circles 2.2# ball 2m Marbles 2 m 2.2#   ball  2m Marbles 2m 2.2# 2m 2.2#  2m 2.2#  2m 2.2#  2m 2.2#  2m 2.2#  ball  2m    Powerweb   BPW 3x10 BPW  2x30 BPW  2x30 BPW 2x30 BPW 2x30 BPW 2x30 BPW 2x30 BlackPW 3x10 BPW  5x10 BPW  3x10   Powerweb  simulating opening jar/wrist radial deviation GPW 3x10        YPW  3x10 GPW  10x3  Wrist/forearm stabilization with little oscillations GPW 3x10    Scrub   1m  50%            Ther Activity             Peg  w. supination              Pinch pins  Blue  3x10 Blue 5 sets, varying grasps Black  1 set  Blue  2 sets  black  3sets Blue  3 sets G  3sets  G 3 sets, varying grasps Blue   5 sets Blue  3x10    Work sim lift  B  11# 2x10 11#  3x10  11#  3x10  #11 3x10 ball 11#  3x10  ball 11# ball  3x10 #11 ball 3x10 11#  2x10 11#  2x10   UBE  5m 5m   6m 6m 6m level 1 6m level 2.5 6m 5m                Modalities             MH R 8m 8m 8m 8m 8m 6m 6m 8m 6m 6m   CP post Post tx 5m 5m 5m 5m 5m 5m 5m

## 2024-12-27 ENCOUNTER — OFFICE VISIT (OUTPATIENT)
Dept: OCCUPATIONAL THERAPY | Facility: CLINIC | Age: 39
End: 2024-12-27
Payer: OTHER MISCELLANEOUS

## 2024-12-27 DIAGNOSIS — S69.81XD INJURY OF TRIANGULAR FIBROCARTILAGE COMPLEX (TFCC) OF RIGHT WRIST, SUBSEQUENT ENCOUNTER: Primary | ICD-10-CM

## 2024-12-27 PROCEDURE — 97110 THERAPEUTIC EXERCISES: CPT | Performed by: OCCUPATIONAL THERAPIST

## 2024-12-27 PROCEDURE — 97140 MANUAL THERAPY 1/> REGIONS: CPT | Performed by: OCCUPATIONAL THERAPIST

## 2024-12-27 NOTE — PROGRESS NOTES
Daily Note     Today's date: 2024  Patient name: Juhi Tena  : 1985  MRN: [id number]  Referring provider: Josue Barker PA-C  Dx:   Encounter Diagnosis     ICD-10-CM    1. 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      Objective: See treatment diary below      Assessment: Tolerated treatment fair. Patient continues with radial wrist pain.      Plan: Continue per plan of care.      Precautions:   Sx 24  F/u with MD 24       CO-MORBIDITIES:  HEP ACCESS CODE:   FOTO Completed On:     POC Expires Reeval for Medicare to be completed Unit LImit Auth Expiration Date PT/OT/STVisit Limit     By visit 27  NA   na    Completed on visit               ows at top of treatment diary - top left boxes should look like the example below       TREATMENT DIARY  Auth Status                   Visit # 15   Visits Remaining 1           20 21 out of 24 22 out of 24         Manuals          TPR ext mass            Scar mob 2m 2m 2m         retrograde 3m 3m 3m         Graston R 4m 4m 4m         Neuro Re-Ed            KT             Wrist A/AAROM, TGEs            Yoke splint for R rng sag band                                                             Ther Ex            Wrist A/AAROM 3# and 2#  3x10 #3 3x10 #3 3x10         P/S A/AAROM red  2x10 R 3x10 R 3x10         Foam roll stretch flex/ext            Finklestein stretch            Wrist circles 2.2# ball 2m 2.2# 2m 2.2# 2m         Powerweb   BPW 3x10 BPW 2x30 BPW 2x30         Powerweb  simulating opening jar/wrist radial deviation GPW 3x10            Scrub  1m 50% 1m 50% 1m         Ther Activity            Peg  w. supination            Pinch pins  Blue  3x10 Blue 3 sets Blue 3 sets          Work sim lift  B  11# 2x10 #11 3x10 #11 3x10         UBE  5m 5m 5m                     Modalities            MH R 8m 8m 8m         CP  post Post tx 5m 5m                 13 14 15 16 17 18 19 20   Manuals 11/14 11/18 11/21 11/25 11/29 12/2 12/4 12/9 12/11 12/16   TPR ext mass 2m 2m    2m 2m 2m  2m   Scar mob 2m 2m 4m 4m 4m 3m 3m 2m 2m 2m   retrograde 3m 3m 4m 4m 4m 3m 3m 3m 3m 3m   Graston R 4m 4m 4m 4m 4m 4m 4m 4m 4m 4m   Neuro Re-Ed             KT       DW       Wrist A/AAROM, TGEs             Yoke splint for R rng sag band                                                                   Ther Ex             Wrist A/AAROM 3# and 2#  3x10 3#  3x10 3#  3x10 4#   3x10 #3 3x10 3#    3x10 3#  3x10 #3  3x10 #3  3x10 3#  3x10   P/S A/AAROM red  2x10 Red  3x10 Red  3x10 SM 3x10  SM  3x10 red  2x10 LG  3x10 LG  3x10 R  3x10   Foam roll stretch flex/ext        2x10     Finklestein stretch    x10 x10   x20      Wrist circles 2.2# ball 2m Marbles 2 m 2.2#   ball  2m Marbles 2m 2.2# 2m 2.2#  2m 2.2#  2m 2.2#  2m 2.2#  2m 2.2#  ball  2m    Powerweb   BPW 3x10 BPW  2x30 BPW  2x30 BPW 2x30 BPW 2x30 BPW 2x30 BPW 2x30 BlackPW 3x10 BPW  5x10 BPW  3x10   Powerweb  simulating opening jar/wrist radial deviation GPW 3x10        YPW  3x10 GPW  10x3  Wrist/forearm stabilization with little oscillations GPW 3x10    Scrub   1m  50%            Ther Activity             Peg  w. supination              Pinch pins  Blue  3x10 Blue 5 sets, varying grasps Black  1 set  Blue  2 sets  black  3sets Blue  3 sets G  3sets  G 3 sets, varying grasps Blue   5 sets Blue  3x10    Work sim lift  B  11# 2x10 11#  3x10  11#  3x10  #11 3x10 ball 11#  3x10  ball 11# ball  3x10 #11 ball 3x10 11#  2x10 11#  2x10   UBE  5m 5m   6m 6m 6m level 1 6m level 2.5 6m 5m                Modalities             MH R 8m 8m 8m 8m 8m 6m 6m 8m 6m 6m   CP post Post tx 5m 5m 5m 5m 5m 5m 5m

## 2024-12-30 ENCOUNTER — OFFICE VISIT (OUTPATIENT)
Dept: OCCUPATIONAL THERAPY | Facility: CLINIC | Age: 39
End: 2024-12-30
Payer: OTHER MISCELLANEOUS

## 2024-12-30 DIAGNOSIS — S69.81XD INJURY OF TRIANGULAR FIBROCARTILAGE COMPLEX (TFCC) OF RIGHT WRIST, SUBSEQUENT ENCOUNTER: Primary | ICD-10-CM

## 2024-12-30 PROCEDURE — 97110 THERAPEUTIC EXERCISES: CPT | Performed by: OCCUPATIONAL THERAPIST

## 2024-12-30 PROCEDURE — 97140 MANUAL THERAPY 1/> REGIONS: CPT | Performed by: OCCUPATIONAL THERAPIST

## 2024-12-30 NOTE — PROGRESS NOTES
Daily Note     Today's date: 2024  Patient name: Juhi Tena  : 1985  MRN: [id number]  Referring provider: Josue Barker PA-C  Dx:   Encounter Diagnosis     ICD-10-CM    1. 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 Patient remains consistent with pain synptoms of the radial wrist.    Plan: Continue per plan of care.      Precautions:   Sx 24  F/u with MD 24       CO-MORBIDITIES:  HEP ACCESS CODE:   FOTO Completed On:     POC Expires Reeval for Medicare to be completed Unit LImit Auth Expiration Date PT/OT/STVisit Limit     By visit   NA   na    Completed on visit               ows at top of treatment diary - top left boxes should look like the example below       TREATMENT DIARY  Auth Status                   Visit # 15   Visits Remaining 1           20 21 out of 24 22 out of 24 23 out of 24        Manuals         TPR ext mass            Scar mob 2m 2m 2m 2m        retrograde 3m 3m 3m 3m        Graston R 4m 4m 4m 4m        Neuro Re-Ed            KT             Wrist A/AAROM, TGEs            Yoke splint for R rng sag band                                                             Ther Ex            Wrist A/AAROM 3# and 2#  3x10 #3 3x10 #3 3x10 #3 3x10        P/S A/AAROM red  2x10 R 3x10 R 3x10 R 3x10        Foam roll stretch flex/ext            Finkl[place]in stretch            Wrist circles 2.2# ball 2m 2.2# 2m 2.2# 2m 2.2# 2m        Powerweb   BPW 3x10 BPW 2x30 BPW 2x30 BPW 2x30        Powerweb  simulating opening jar/wrist radial deviation GPW 3x10            Scrub  1m 50% 1m 50% 1m 1m        Ther Activity            Peg  w. supination            Pinch pins  Blue  3x10 Blue 3 sets Blue 3 sets Blue 3 sets         Work sim lift  B  11# 2x10 #11 3x10 #11  3x10 #11 3x10        UBE  5m 5m 5m 6m                    Modalities            MH R 8m 8m 8m 8m        CP post Post tx 5m 5m 5m                13 14 15 16 17 18 19 20   Manuals 11/14 11/18 11/21 11/25 11/29 12/2 12/4 12/9 12/11 12/16   TPR ext mass 2m 2m    2m 2m 2m  2m   Scar mob 2m 2m 4m 4m 4m 3m 3m 2m 2m 2m   retrograde 3m 3m 4m 4m 4m 3m 3m 3m 3m 3m   Graston R 4m 4m 4m 4m 4m 4m 4m 4m 4m 4m   Neuro Re-Ed             KT       DW       Wrist A/AAROM, TGEs             Yoke splint for R rng sag band                                                                   Ther Ex             Wrist A/AAROM 3# and 2#  3x10 3#  3x10 3#  3x10 4#   3x10 #3 3x10 3#    3x10 3#  3x10 #3  3x10 #3  3x10 3#  3x10   P/S A/AAROM red  2x10 Red  3x10 Red  3x10 SM 3x10  SM  3x10 red  2x10 LG  3x10 LG  3x10 R  3x10   Foam roll stretch flex/ext        2x10     Finklestein stretch    x10 x10   x20      Wrist circles 2.2# ball 2m Marbles 2 m 2.2#   ball  2m Marbles 2m 2.2# 2m 2.2#  2m 2.2#  2m 2.2#  2m 2.2#  2m 2.2#  ball  2m    Powerweb   BPW 3x10 BPW  2x30 BPW  2x30 BPW 2x30 BPW 2x30 BPW 2x30 BPW 2x30 BlackPW 3x10 BPW  5x10 BPW  3x10   Powerweb  simulating opening jar/wrist radial deviation GPW 3x10        YPW  3x10 GPW  10x3  Wrist/forearm stabilization with little oscillations GPW 3x10    Scrub   1m  50%            Ther Activity             Peg  w. supination              Pinch pins  Blue  3x10 Blue 5 sets, varying grasps Black  1 set  Blue  2 sets  black  3sets Blue  3 sets G  3sets  G 3 sets, varying grasps Blue   5 sets Blue  3x10    Work sim lift  B  11# 2x10 11#  3x10  11#  3x10  #11 3x10 ball 11#  3x10  ball 11# ball  3x10 #11 ball 3x10 11#  2x10 11#  2x10   UBE  5m 5m   6m 6m 6m level 1 6m level 2.5 6m 5m                Modalities             MH R 8m 8m 8m 8m 8m 6m 6m 8m 6m 6m   CP post Post tx 5m 5m 5m 5m 5m 5m 5m

## 2025-01-06 ENCOUNTER — APPOINTMENT (OUTPATIENT)
Dept: OCCUPATIONAL THERAPY | Facility: CLINIC | Age: 40
End: 2025-01-06
Payer: OTHER MISCELLANEOUS

## 2025-01-08 ENCOUNTER — OFFICE VISIT (OUTPATIENT)
Dept: OCCUPATIONAL THERAPY | Facility: CLINIC | Age: 40
End: 2025-01-08
Payer: OTHER MISCELLANEOUS

## 2025-01-08 DIAGNOSIS — S69.81XD INJURY OF TRIANGULAR FIBROCARTILAGE COMPLEX (TFCC) OF RIGHT WRIST, SUBSEQUENT ENCOUNTER: Primary | ICD-10-CM

## 2025-01-08 PROCEDURE — 97530 THERAPEUTIC ACTIVITIES: CPT | Performed by: OCCUPATIONAL THERAPIST

## 2025-01-08 PROCEDURE — 97140 MANUAL THERAPY 1/> REGIONS: CPT | Performed by: OCCUPATIONAL THERAPIST

## 2025-01-08 PROCEDURE — 97110 THERAPEUTIC EXERCISES: CPT | Performed by: OCCUPATIONAL THERAPIST

## 2025-01-08 NOTE — PROGRESS NOTES
Daily Note     Today's date: 2025  Patient name: Juhi Tena  : 1985  MRN: [id number]  Referring provider: Josue Barker PA-C  Dx:   Encounter Diagnosis     ICD-10-CM    1. 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 Patient remains consistent with pain synptoms of the radial wrist. NO changes in her pain levels.       Plan: Continue per plan of care.      Precautions:   Sx 24  F/u with MD 24       CO-MORBIDITIES:  HEP ACCESS CODE:   FOTO Completed On:     POC Expires Reeval for Medicare to be completed Unit LImit Auth Expiration Date PT/OT/STVisit Limit     By visit   NA   na    Completed on visit               ows at top of treatment diary - top left boxes should look like the example below       TREATMENT DIARY  Auth Status                   Visit # 15   Visits Remaining 1           20 21 out of 24 22 out of 24 23 out of 24 1 out of 30       Manuals        TPR ext mass            Scar mob 2m 2m 2m 2m 2m       retrograde 3m 3m 3m 3m 3m       Graston R 4m 4m 4m 4m 4m       Neuro Re-Ed            KT             Wrist A/AAROM, TGEs            Yoke splint for R rng sag band                                                             Ther Ex            Wrist A/AAROM 3# and 2#  3x10 #3 3x10 #3 3x10 #3 3x10 #3 3x10       P/S A/AAROM red  2x10 R 3x10 R 3x10 R 3x10 R 3x10       Foam roll stretch flex/ext            Finkl[place]in stretch            Wrist circles 2.2# ball 2m 2.2# 2m 2.2# 2m 2.2# 2m 2.2# 2m       Powerweb   BPW 3x10 BPW 2x30 BPW 2x30 BPW 2x30 BPW 2x30       Powerweb  simulating opening jar/wrist radial deviation GPW 3x10            Scrub  1m 50% 1m 50% 1m 1m 1m       Ther Activity            Peg  w. supination            Pinch pins  Blue  3x10  Blue 3 sets Blue 3 sets Blue 3 sets Blue 3 sets        Work sim lift  B  11# 2x10 #11 3x10 #11 3x10 #11 3x10 #11 3x10       UBE  5m 5m 5m 6m 6m                   Modalities            MH R 8m 8m 8m 8m 8m       CP post Post tx 5m 5m 5m 5m               13 14 15 16 17 18 19 20   Manuals 11/14 11/18 11/21 11/25 11/29 12/2 12/4 12/9 12/11 12/16   TPR ext mass 2m 2m    2m 2m 2m  2m   Scar mob 2m 2m 4m 4m 4m 3m 3m 2m 2m 2m   retrograde 3m 3m 4m 4m 4m 3m 3m 3m 3m 3m   Graston R 4m 4m 4m 4m 4m 4m 4m 4m 4m 4m   Neuro Re-Ed             KT       DW       Wrist A/AAROM, TGEs             Yoke splint for R rng sag band                                                                   Ther Ex             Wrist A/AAROM 3# and 2#  3x10 3#  3x10 3#  3x10 4#   3x10 #3 3x10 3#    3x10 3#  3x10 #3  3x10 #3  3x10 3#  3x10   P/S A/AAROM red  2x10 Red  3x10 Red  3x10 SM 3x10  SM  3x10 red  2x10 LG  3x10 LG  3x10 R  3x10   Foam roll stretch flex/ext        2x10     Finklestein stretch    x10 x10   x20      Wrist circles 2.2# ball 2m Marbles 2 m 2.2#   ball  2m Marbles 2m 2.2# 2m 2.2#  2m 2.2#  2m 2.2#  2m 2.2#  2m 2.2#  ball  2m    Powerweb   BPW 3x10 BPW  2x30 BPW  2x30 BPW 2x30 BPW 2x30 BPW 2x30 BPW 2x30 BlackPW 3x10 BPW  5x10 BPW  3x10   Powerweb  simulating opening jar/wrist radial deviation GPW 3x10        YPW  3x10 GPW  10x3  Wrist/forearm stabilization with little oscillations GPW 3x10    Scrub   1m  50%            Ther Activity             Peg  w. supination              Pinch pins  Blue  3x10 Blue 5 sets, varying grasps Black  1 set  Blue  2 sets  black  3sets Blue  3 sets G  3sets  G 3 sets, varying grasps Blue   5 sets Blue  3x10    Work sim lift  B  11# 2x10 11#  3x10  11#  3x10  #11 3x10 ball 11#  3x10  ball 11# ball  3x10 #11 ball 3x10 11#  2x10 11#  2x10   UBE  5m 5m   6m 6m 6m level 1 6m level 2.5 6m 5m                Modalities             MH R 8m 8m 8m 8m 8m 6m 6m 8m 6m 6m   CP post Post tx 5m 5m 5m 5m 5m 5m  5m

## 2025-01-13 ENCOUNTER — OFFICE VISIT (OUTPATIENT)
Dept: OCCUPATIONAL THERAPY | Facility: CLINIC | Age: 40
End: 2025-01-13
Payer: OTHER MISCELLANEOUS

## 2025-01-13 DIAGNOSIS — S69.81XD INJURY OF TRIANGULAR FIBROCARTILAGE COMPLEX (TFCC) OF RIGHT WRIST, SUBSEQUENT ENCOUNTER: Primary | ICD-10-CM

## 2025-01-13 PROCEDURE — 97530 THERAPEUTIC ACTIVITIES: CPT | Performed by: OCCUPATIONAL THERAPIST

## 2025-01-13 PROCEDURE — 97140 MANUAL THERAPY 1/> REGIONS: CPT | Performed by: OCCUPATIONAL THERAPIST

## 2025-01-13 PROCEDURE — 97110 THERAPEUTIC EXERCISES: CPT | Performed by: OCCUPATIONAL THERAPIST

## 2025-01-13 NOTE — PROGRESS NOTES
Daily Note     Today's date: 2025  Patient name: Juhi Tena  : 1985  MRN: [id number]  Referring provider: Josue Barker PA-C  Dx:   Encounter Diagnosis     ICD-10-CM    1. 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      Objective: See treatment diary below      Assessment: Tolerated treatment fair. Patient continues with pain in wrist, Soreness with overuse and lifting.  She has clicking on the ulnar side of the wrist and irritated internal suture.      Plan: Continue per plan of care.      Precautions:   Sx 24  F/u with MD 24       CO-MORBIDITIES:  HEP ACCESS CODE:   FOTO Completed On:     POC Expires Reeval for Medicare to be completed Unit LImit Auth Expiration Date PT/OT/STVisit Limit     By visit 27  NA   na    Completed on visit               ows at top of treatment diary - top left boxes should look like the example below       TREATMENT DIARY  Auth Status                   Visit # 15   Visits Remaining 1           20 21 out of 24 22 out of 24 23 out of 24 1 out of 30       Manuals       TPR ext mass            Scar mob 2m 2m 2m 2m 2m 2m      retrograde 3m 3m 3m 3m 3m 3m      Graston R 4m 4m 4m 4m 4m 4m      Neuro Re-Ed            KT             Wrist A/AAROM, TGEs            Yoke splint for R rng sag band                                                             Ther Ex            Wrist A/AAROM 3# and 2#  3x10 #3 3x10 #3 3x10 #3 3x10 #3 3x10 #3 3x10      P/S A/AAROM red  2x10 R 3x10 R 3x10 R 3x10 R 3x10 R 3x10      Foam roll stretch flex/ext            Finklestein stretch            Wrist circles 2.2# ball 2m 2.2# 2m 2.2# 2m 2.2# 2m 2.2# 2m 2.2# 2m      Powerweb   BPW 3x10 BPW 2x30 BPW 2x30 BPW 2x30 BPW 2x30 BPW 2x30      Powerweb  simulating opening jar/wrist radial deviation GPW 3x10            Scrub  1m 50% 1m 50% 1m 1m  1m       Ther Activity            Peg  w. supination            Pinch pins  Blue  3x10 Blue 3 sets Blue 3 sets Blue 3 sets Blue 3 sets Blue       Work sim lift  B  11# 2x10 #11 3x10 #11 3x10 #11 3x10 #11 3x10 #11 3x10      UBE  5m 5m 5m 6m 6m 6m                  Modalities            MH R 8m 8m 8m 8m 8m 8m      CP post Post tx 5m 5m 5m 5m 5m              13 14 15 16 17 18 19 20   Manuals 11/14 11/18 11/21 11/25 11/29 12/2 12/4 12/9 12/11 12/16   TPR ext mass 2m 2m    2m 2m 2m  2m   Scar mob 2m 2m 4m 4m 4m 3m 3m 2m 2m 2m   retrograde 3m 3m 4m 4m 4m 3m 3m 3m 3m 3m   Graston R 4m 4m 4m 4m 4m 4m 4m 4m 4m 4m   Neuro Re-Ed             KT       DW       Wrist A/AAROM, TGEs             Yoke splint for R rng sag band                                                                   Ther Ex             Wrist A/AAROM 3# and 2#  3x10 3#  3x10 3#  3x10 4#   3x10 #3 3x10 3#    3x10 3#  3x10 #3  3x10 #3  3x10 3#  3x10   P/S A/AAROM red  2x10 Red  3x10 Red  3x10 SM 3x10  SM  3x10 red  2x10 LG  3x10 LG  3x10 R  3x10   Foam roll stretch flex/ext        2x10     Finklestein stretch    x10 x10   x20      Wrist circles 2.2# ball 2m Marbles 2 m 2.2#   ball  2m Marbles 2m 2.2# 2m 2.2#  2m 2.2#  2m 2.2#  2m 2.2#  2m 2.2#  ball  2m    Powerweb   BPW 3x10 BPW  2x30 BPW  2x30 BPW 2x30 BPW 2x30 BPW 2x30 BPW 2x30 BlackPW 3x10 BPW  5x10 BPW  3x10   Powerweb  simulating opening jar/wrist radial deviation GPW 3x10        YPW  3x10 GPW  10x3  Wrist/forearm stabilization with little oscillations GPW 3x10    Scrub   1m  50%            Ther Activity             Peg  w. supination              Pinch pins  Blue  3x10 Blue 5 sets, varying grasps Black  1 set  Blue  2 sets  black  3sets Blue  3 sets G  3sets  G 3 sets, varying grasps Blue   5 sets Blue  3x10    Work sim lift  B  11# 2x10 11#  3x10  11#  3x10  #11 3x10 ball 11#  3x10  ball 11# ball  3x10 #11 ball 3x10 11#  2x10 11#  2x10   UBE  5m 5m   6m 6m 6m level 1 6m level 2.5 6m  5m                Modalities             MH R 8m 8m 8m 8m 8m 6m 6m 8m 6m 6m   CP post Post tx 5m 5m 5m 5m 5m 5m 5m

## 2025-01-15 ENCOUNTER — OFFICE VISIT (OUTPATIENT)
Dept: OCCUPATIONAL THERAPY | Facility: CLINIC | Age: 40
End: 2025-01-15
Payer: OTHER MISCELLANEOUS

## 2025-01-15 DIAGNOSIS — S69.81XD INJURY OF TRIANGULAR FIBROCARTILAGE COMPLEX (TFCC) OF RIGHT WRIST, SUBSEQUENT ENCOUNTER: Primary | ICD-10-CM

## 2025-01-15 PROCEDURE — 97530 THERAPEUTIC ACTIVITIES: CPT | Performed by: OCCUPATIONAL THERAPIST

## 2025-01-15 PROCEDURE — 97110 THERAPEUTIC EXERCISES: CPT | Performed by: OCCUPATIONAL THERAPIST

## 2025-01-15 PROCEDURE — 97140 MANUAL THERAPY 1/> REGIONS: CPT | Performed by: OCCUPATIONAL THERAPIST

## 2025-01-15 NOTE — PROGRESS NOTES
Daily Note     Today's date: 1/15/2025  Patient name: Juhi Tena  : 1985  MRN: [id number]  Referring provider: Josue Barker PA-C  Dx:   Encounter Diagnosis     ICD-10-CM    1. 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 Patient is performing TherEx without complaint, unable to progress POC of resistance due to pain.    Plan: Continue per plan of care.      Precautions:   Sx 24  F/u with MD 24       CO-MORBIDITIES:  HEP ACCESS CODE:   FOTO Completed On:     POC Expires Reeval for Medicare to be completed Unit LImit Auth Expiration Date PT/OT/STVisit Limit     By visit   NA   na    Completed on visit               ows at top of treatment diary - top left boxes should look like the example below       TREATMENT DIARY  Auth Status                   Visit # 15   Visits Remaining 1           20 21 out of 24 22 out of 24 23 out of 24 1 out of 30 2 /30 3/30     Manuals 12/18 12/23 12/27 12/30 1/8 1/13 1/15     TPR ext mass            Scar mob 2m 2m 2m 2m 2m 2m 2m     retrograde 3m 3m 3m 3m 3m 3m 3m     Graston R 4m 4m 4m 4m 4m 4m 4m     Neuro Re-Ed            KT             Wrist A/AAROM, TGEs            Yoke splint for R rng sag band                                                             Ther Ex            Wrist A/AAROM 3# and 2#  3x10 #3 3x10 #3 3x10 #3 3x10 #3 3x10 #3 3x10 #3 3x10     P/S A/AAROM red  2x10 R 3x10 R 3x10 R 3x10 R 3x10 R 3x10 R 3x10     Foam roll stretch flex/ext            Finklestein stretch            Wrist circles 2.2# ball 2m 2.2# 2m 2.2# 2m 2.2# 2m 2.2# 2m 2.2# 2m 2.2# 2m     Powerweb   BPW 3x10 BPW 2x30 BPW 2x30 BPW 2x30 BPW 2x30 BPW 2x30 BPW 2x30     Powerweb  simulating opening jar/wrist radial deviation GPW 3x10            Scrub  1m 50% 1m 50% 1m 1m 1m       Ther  Activity            Peg  w. supination            Pinch pins  Blue  3x10 Blue 3 sets Blue 3 sets Blue 3 sets Blue 3 sets Blue Blue 3 sets      Work sim lift  B  11# 2x10 #11 3x10 #11 3x10 #11 3x10 #11 3x10 #11 3x10 #11 3x10     UBE  5m 5m 5m 6m 6m 6m 6m                 Modalities            MH R 8m 8m 8m 8m 8m 8m 8m     CP post Post tx 5m 5m 5m 5m 5m              13 14 15 16 17 18 19 20   Manuals 11/14 11/18 11/21 11/25 11/29 12/2 12/4 12/9 12/11 12/16   TPR ext mass 2m 2m    2m 2m 2m  2m   Scar mob 2m 2m 4m 4m 4m 3m 3m 2m 2m 2m   retrograde 3m 3m 4m 4m 4m 3m 3m 3m 3m 3m   Graston R 4m 4m 4m 4m 4m 4m 4m 4m 4m 4m   Neuro Re-Ed             KT       DW       Wrist A/AAROM, TGEs             Yoke splint for R rng sag band                                                                   Ther Ex             Wrist A/AAROM 3# and 2#  3x10 3#  3x10 3#  3x10 4#   3x10 #3 3x10 3#    3x10 3#  3x10 #3  3x10 #3  3x10 3#  3x10   P/S A/AAROM red  2x10 Red  3x10 Red  3x10 SM 3x10  SM  3x10 red  2x10 LG  3x10 LG  3x10 R  3x10   Foam roll stretch flex/ext        2x10     Finklestein stretch    x10 x10   x20      Wrist circles 2.2# ball 2m Marbles 2 m 2.2#   ball  2m Marbles 2m 2.2# 2m 2.2#  2m 2.2#  2m 2.2#  2m 2.2#  2m 2.2#  ball  2m    Powerweb   BPW 3x10 BPW  2x30 BPW  2x30 BPW 2x30 BPW 2x30 BPW 2x30 BPW 2x30 BlackPW 3x10 BPW  5x10 BPW  3x10   Powerweb  simulating opening jar/wrist radial deviation GPW 3x10        YPW  3x10 GPW  10x3  Wrist/forearm stabilization with little oscillations GPW 3x10    Scrub   1m  50%            Ther Activity             Peg  w. supination              Pinch pins  Blue  3x10 Blue 5 sets, varying grasps Black  1 set  Blue  2 sets  black  3sets Blue  3 sets G  3sets  G 3 sets, varying grasps Blue   5 sets Blue  3x10    Work sim lift  B  11# 2x10 11#  3x10  11#  3x10  #11 3x10 ball 11#  3x10  ball 11# ball  3x10 #11 ball 3x10 11#  2x10 11#  2x10   UBE  5m 5m   6m 6m 6m level 1 6m  level 2.5 6m 5m                Modalities             MH R 8m 8m 8m 8m 8m 6m 6m 8m 6m 6m   CP post Post tx 5m 5m 5m 5m 5m 5m 5m

## 2025-01-20 ENCOUNTER — OFFICE VISIT (OUTPATIENT)
Dept: OBGYN CLINIC | Facility: CLINIC | Age: 40
End: 2025-01-20
Payer: OTHER MISCELLANEOUS

## 2025-01-20 ENCOUNTER — OFFICE VISIT (OUTPATIENT)
Dept: OCCUPATIONAL THERAPY | Facility: CLINIC | Age: 40
End: 2025-01-20
Payer: OTHER MISCELLANEOUS

## 2025-01-20 VITALS — BODY MASS INDEX: 26.75 KG/M2 | WEIGHT: 151 LBS | HEIGHT: 63 IN

## 2025-01-20 DIAGNOSIS — S69.81XD INJURY OF TRIANGULAR FIBROCARTILAGE COMPLEX (TFCC) OF RIGHT WRIST, SUBSEQUENT ENCOUNTER: Primary | ICD-10-CM

## 2025-01-20 PROCEDURE — 99213 OFFICE O/P EST LOW 20 MIN: CPT | Performed by: ORTHOPAEDIC SURGERY

## 2025-01-20 PROCEDURE — 97140 MANUAL THERAPY 1/> REGIONS: CPT | Performed by: OCCUPATIONAL THERAPIST

## 2025-01-20 PROCEDURE — 97110 THERAPEUTIC EXERCISES: CPT | Performed by: OCCUPATIONAL THERAPIST

## 2025-01-20 PROCEDURE — 97530 THERAPEUTIC ACTIVITIES: CPT | Performed by: OCCUPATIONAL THERAPIST

## 2025-01-20 NOTE — PROGRESS NOTES
ASSESSMENT/PLAN:    Assessment:   S/p right TFCC repair performed on 05/16/2024 with persistent pain    Plan:   Discussed with the patient that her symptoms are consistent with s/p TFCC repair.  Explained the due to her injury being due to a workers compensation case, she is cleared to return to work with no restrictions as tolerated.  Explained that a work note can be provided to make her employer aware of her not having formal restrictions however explaining that she does still have pain with certain movements.  Explained that she does have the option to do a full functional capability test, however any restrictions that are determined would be permanent and could prevent her from acquiring a desired job in the future.    Due to patient having completed an WILLY and stating that she has a weight limitation and other restrictions, she will need to provide a copy of report for review and future planning  New referral to hand therapy was placed at time of visit for continued therapy with no restrictions  Patient will follow-up in the office in 8 weeks for reevaluation of symptoms, and for future discussion on Worker's Compensation case  The patient expresses understanding and is in agreement with today's treatment plan.       Follow Up:  8  week(s)    _____________________________________________________  CHIEF COMPLAINT:  Chief Complaint   Patient presents with   • Right Wrist - Follow-up     TFCC Repair - 5/16/24       SUBJECTIVE:  Juhi Tena is a 39 y.o. female who presents for follow up status post TFCC repair performed on 5/16/2024. Patient was last seen in office on 10/21/24.  Patient states that she is still progressing in physical therapy and states that she has continued to improve her range of motion in wrist flexion and extension.  She states that she does still feel the area of the suture over the incision site.  She states that she does have occasional clicking which is sometimes painful, and has  significant stiffness in the morning.  She states that she has not returned to work at this time due to them not offering light duty, and her not progressing past 10 pounds of weighted exercises.  Patient states that per her Worker's Compensation case she did have an WILLY completed as of late November/early December 2025, however she states that she did not bring the report with her.        PAST MEDICAL HISTORY:  Past Medical History:   Diagnosis Date   • Anemia    • Anxiety    • Chronic pain disorder     wrist right   • COVID 2021   • De Quervain's tenosynovitis, right    • Hx of bleeding disorder    • Von Willebrand disease (HCC)    • Wears glasses        PAST SURGICAL HISTORY:  Past Surgical History:   Procedure Laterality Date   • NO PAST SURGERIES     • GA APPLICATION LONG ARM SPLINT SHOULDER HAND Right 5/16/2024    Procedure: Application sugar tong splint;  Surgeon: Louie Barone MD;  Location:  MAIN OR;  Service: Orthopedics   • GA ARTHROSCOPY WRIST SURGICAL SYNOVECTOMY PARTIAL Right 5/16/2024    Procedure: Right wrist arthroscopy with TFCC Repair and partial synovectomy;  Surgeon: Louie Barone MD;  Location:  MAIN OR;  Service: Orthopedics   • GA ARTHRS WRST EXC&/RPR TRIANG FIBROCART&/JOINT Right 5/16/2024    Procedure: Right wrist arthroscopy with TFCC Repair and partial synovectomy;  Surgeon: Louie Barone MD;  Location:  MAIN OR;  Service: Orthopedics   • GA EXCISION GANGLION WRIST DORSAL/VOLAR PRIMARY  5/16/2024    Procedure: Excision ganglion cyst of the extensor tendon sheath first compartment;  Surgeon: Louie Barone MD;  Location: [facility];  Service: Orthopedics   • GA INCISION EXTENSOR TENDON SHEATH WRIST Right 5/16/2024    Procedure: Right de Quervain's release;  Surgeon: Louie Barone MD;  Location:  MAIN OR;  Service: Orthopedics       FAMILY HISTORY:  Family History   Problem Relation Age of Onset   • No Known Problems Mother    • No Known Problems Father         SOCIAL HISTORY:  Social History     Tobacco Use   • Smoking status: Never   • Smokeless tobacco: Never   Vaping Use   • Vaping status: Never Used   Substance Use Topics   • Alcohol use: Yes   • Drug use: Never       MEDICATIONS:    Current Outpatient Medications:   •  acetaminophen (TYLENOL) 325 mg tablet, Take 650 mg by mouth every 6 (six) hours as needed for mild pain, Disp: , Rfl:   •  [employer] Naproxen Sodium 220 MG CAPS, TAKE 1 CAPSULE (220 MG TOTAL) BY MOUTH 2 (TWO) TIMES A DAY *OTC*, Disp: 60 capsule, Rfl: 0  •  Diclofenac Sodium (VOLTAREN) 1 %, Apply 2 g topically if needed, Disp: , Rfl:   •  diphenhydrAMINE (BENADRYL) 25 mg capsule, Take 25 mg by mouth every 6 (six) hours as needed for sleep, Disp: , Rfl:   •  Green Tea, Estefania sinensis, (GREEN TEA EXTRACT PO), Take by mouth, Disp: , Rfl:   •  ibuprofen (MOTRIN) 200 mg tablet, Take by mouth every 6 (six) hours as needed for mild pain, Disp: , Rfl:   •  multivitamin (THERAGRAN) TABS, Take 1 tablet by mouth daily, Disp: , Rfl:   •  sertraline (ZOLOFT) 25 mg tablet, Take 25 mg by mouth daily Patient stated taking 100mg (Patient taking differently: Take 100 mg by mouth daily Patient stated taking 100mg), Disp: , Rfl:   •  acetaminophen (TYLENOL) 650 mg CR tablet, Take 1 tablet (650 mg total) by mouth every 8 (eight) hours as needed for mild pain (Patient not taking: Reported on 1/20/2025), Disp: 30 tablet, Rfl: 0  •  naproxen (NAPROSYN) 250 mg tablet, Take 250 mg by mouth 2 (two) times a day with meals (Patient not taking: Reported on 1/20/2025), Disp: , Rfl:   •  traMADol (Ultram) 50 mg tablet, Take 1 tablet (50 mg total) by mouth every 6 (six) hours as needed for moderate pain (Patient not taking: Reported on 1/20/2025), Disp: 10 tablet, Rfl: 0    ALLERGIES:  Allergies   Allergen Reactions   • Shellfish-Derived Products - Food Allergy Anaphylaxis       REVIEW OF SYSTEMS:  Pertinent items are noted in HPI.  A comprehensive review of systems was  "negative.    LABS:  HgA1c: No results found for: \"HGBA1C\"  BMP: No results found for: \"GLUCOSE\", \"CALCIUM\", \"NA\", \"K\", \"CO2\", \"CL\", \"BUN\", \"CREATININE\"    _____________________________________________________  PHYSICAL EXAMINATION:  Vital signs: Ht 5' 3\" (1.6 m)   Wt 68.5 kg (151 lb)   BMI 26.75 kg/m²   General: well developed and well nourished, alert, oriented times 3, and appears comfortable  Psychiatric: Normal  HEENT: Trachea Midline, No torticollis  Cardiovascular: No discernable arrhythmia  Pulmonary: No wheezing or stridor  Abdomen: No rebound or guarding  Extremities: No peripheral edema  Skin: No masses, erythema, lacerations, fluctation, ulcerations  Neurovascular: Sensation Intact to the Median, Ulnar, Radial Nerve, Motor Intact to the Median, Ulnar, Radial Nerve, and Pulses Intact    MUSCULOSKELETAL EXAMINATION:  Right wrist: well healed incision, palpable knot present, full ROM in wrist flexion and extension.     _____________________________________________________  STUDIES REVIEWED:  No Studies to review      PROCEDURES PERFORMED:  Procedures  No Procedures performed today         Scribe Attestation    I,:  Shelly Heard am acting as a scribe while in the presence of the attending physician.:       I,:  Louie Barone MD personally performed the services described in this documentation    as scribed in my presence.:           "

## 2025-01-20 NOTE — LETTER
January 20, 2025     Patient: Juhi Tena  YOB: 1985  Date of Visit: 1/20/2025      To Whom it May Concern:    Juhi Tena is under my professional care. Juhi was seen in my office on 1/20/2025. Juhi may return to work on 1/21/25 with no formal restrictions however she is limited by pain with certain functions.    If you have any questions or concerns, please don't hesitate to call.         Sincerely,          Louie Barone MD        CC: No Recipients

## 2025-01-20 NOTE — PROGRESS NOTES
Daily Note     Today's date: 2025  Patient name: Juhi Tena  : 1985  MRN: [id number]  Referring provider: Josue Barker PA-C  Dx:   Encounter Diagnosis     ICD-10-CM    1. 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        Objective: See treatment diary below      Assessment: Tolerated treatment fair. Patient continues with pain in the wrist with activities.  Soreness today from cold weather and snow removal.  She is to follow up with MD later today for work status update.  Pt. Symptoms remain consistant with pain and limited progress with PREs due to discomfort.      Plan: Continue per plan of care.      Precautions:   Sx 24  F/u with MD 24       CO-MORBIDITIES:  HEP ACCESS CODE:   FOTO Completed On:     POC Expires Reeval for Medicare to be completed Unit LImit Auth Expiration Date PT/OT/STVisit Limit     By visit 27  NA   na    Completed on visit               ows at top of treatment diary - top left boxes should look like the example below       TREATMENT DIARY  Auth Status                   Visit # 15   Visits Remaining 1           20 21 out of 24 22 out of 24 23 out of 24 1 out of 30 2 /30 3/30 4/30    Manuals 12/18 12/23 12/27 12/30 1/8 1/13 1/15 1/20    TPR ext mass            Scar mob 2m 2m 2m 2m 2m 2m 2m 2m    retrograde 3m 3m 3m 3m 3m 3m 3m 3m    Graston R 4m 4m 4m 4m 4m 4m 4m 4m    Neuro Re-Ed            KT             Wrist A/AAROM, TGEs            Yoke splint for R rng sag band                                                             Ther Ex            Wrist A/AAROM 3# and 2#  3x10 #3 3x10 #3 3x10 #3 3x10 #3 3x10 #3 3x10 #3 3x10 #3 3x10    P/S A/AAROM red  2x10 R 3x10 R 3x10 R 3x10 R 3x10 R 3x10 R 3x10 R 3x10    Foam roll stretch flex/ext            Finklestein stretch            Wrist circles 2.2# ball 2m 2.2# 2m 2.2# 2m 2.2# 2m 2.2# 2m 2.2# 2m 2.2# 2m 2.2#  2m    Powerweb   BPW 3x10 BPW 2x30 BPW 2x30 BPW 2x30 BPW 2x30 BPW 2x30 BPW 2x30 BPW 2x30    Powerweb  simulating opening jar/wrist radial deviation GPW 3x10            Scrub  1m 50% 1m 50% 1m 1m 1m       Ther Activity            Peg  w. supination            Pinch pins  Blue  3x10 Blue 3 sets Blue 3 sets Blue 3 sets Blue 3 sets Blue Blue 3 sets Blue 3 sets     Work sim lift  B  11# 2x10 #11 3x10 #11 3x10 #11 3x10 #11 3x10 #11 3x10 #11 3x10 #11 3x10    UBE  5m 5m 5m 6m 6m 6m 6m 6m                Modalities            MH R 8m 8m 8m 8m 8m 8m 8m 8m    CP post Post tx 5m 5m 5m 5m 5m              13 14 15 16 17 18 19 20   Manuals 11/14 11/18 11/21 11/25 11/29 12/2 12/4 12/9 12/11 12/16   TPR ext mass 2m 2m    2m 2m 2m  2m   Scar mob 2m 2m 4m 4m 4m 3m 3m 2m 2m 2m   retrograde 3m 3m 4m 4m 4m 3m 3m 3m 3m 3m   Graston R 4m 4m 4m 4m 4m 4m 4m 4m 4m 4m   Neuro Re-Ed             KT       DW       Wrist A/AAROM, TGEs             Yoke splint for R rng sag band                                                                   Ther Ex             Wrist A/AAROM 3# and 2#  3x10 3#  3x10 3#  3x10 4#   3x10 #3 3x10 3#    3x10 3#  3x10 #3  3x10 #3  3x10 3#  3x10   P/S A/AAROM red  2x10 Red  3x10 Red  3x10 SM 3x10  SM  3x10 red  2x10 LG  3x10 LG  3x10 R  3x10   Foam roll stretch flex/ext        2x10     Finklestein stretch    x10 x10   x20      Wrist circles 2.2# ball 2m Marbles 2 m 2.2#   ball  2m Marbles 2m 2.2# 2m 2.2#  2m 2.2#  2m 2.2#  2m 2.2#  2m 2.2#  ball  2m    Powerweb   BPW 3x10 BPW  2x30 BPW  2x30 BPW 2x30 BPW 2x30 BPW 2x30 BPW 2x30 BlackPW 3x10 BPW  5x10 BPW  3x10   Powerweb  simulating opening jar/wrist radial deviation GPW 3x10        YPW  3x10 GPW  10x3  Wrist/forearm stabilization with little oscillations GPW 3x10    Scrub   1m  50%            Ther Activity             Peg  w. supination              Pinch pins  Blue  3x10 Blue 5 sets, varying grasps Black  1 set  Blue  2 sets  black  3sets  Blue  3 sets G  3sets  G 3 sets, varying grasps Blue   5 sets Blue  3x10    Work sim lift  B  11# 2x10 11#  3x10  11#  3x10  #11 3x10 ball 11#  3x10  ball 11# ball  3x10 #11 ball 3x10 11#  2x10 11#  2x10   UBE  5m 5m   6m 6m 6m level 1 6m level 2.5 6m 5m                Modalities             MH R 8m 8m 8m 8m 8m 6m 6m 8m 6m 6m   CP post Post tx 5m 5m 5m 5m 5m 5m 5m

## 2025-01-22 ENCOUNTER — OFFICE VISIT (OUTPATIENT)
Dept: OCCUPATIONAL THERAPY | Facility: CLINIC | Age: 40
End: 2025-01-22
Payer: OTHER MISCELLANEOUS

## 2025-01-22 DIAGNOSIS — S69.81XD INJURY OF TRIANGULAR FIBROCARTILAGE COMPLEX (TFCC) OF RIGHT WRIST, SUBSEQUENT ENCOUNTER: ICD-10-CM

## 2025-01-22 PROCEDURE — 97110 THERAPEUTIC EXERCISES: CPT | Performed by: OCCUPATIONAL THERAPIST

## 2025-01-22 PROCEDURE — 97530 THERAPEUTIC ACTIVITIES: CPT | Performed by: OCCUPATIONAL THERAPIST

## 2025-01-22 PROCEDURE — 97140 MANUAL THERAPY 1/> REGIONS: CPT | Performed by: OCCUPATIONAL THERAPIST

## 2025-01-22 NOTE — PROGRESS NOTES
Daily Note     Today's date: 2025  Patient name: Juhi Tena  : 1985  MRN: [id number]  Referring provider: Josue Barker PA-C  Dx:   Encounter Diagnosis     ICD-10-CM    1. 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      Objective: See treatment diary below      Assessment: Tolerated treatment fair. Patient followed up with MD on Monday, receiving clearance to RTW.  Discussed ongoing pain and progression in healing from MD and was given note to RTW.  POC upgrades made to support heavy lifting at work, as pt. Needs to lift 50lbs.  Pt. Struggled to lift single hand KB of 10lbs to shoulder height with lifting activity in clinic today, with pain on the ulnar side of the wrist.  She is able to lift 11lb medicine ball with both hands without as much discomfort.  Plan: Continue per plan of care.      Precautions:   Sx 24  F/u with MD 24       CO-MORBIDITIES:  HEP ACCESS CODE:   FOTO Completed On:     POC Expires Reeval for Medicare to be completed Unit LImit Auth Expiration Date PT/OT/STVisit Limit     By visit 27  NA   na    Completed on visit               ows at top of treatment diary - top left boxes should look like the example below       TREATMENT DIARY  Auth Status                   Visit # 15   Visits Remaining 1           20 21 out of 24 22 out of 24 23 out of 24 1 out of 30 2 /30 3/30 4/30 5/30   Manuals 12/18 12/23 12/27 12/30 1/8 1/13 1/15 1/20 1/22   TPR ext mass            Scar mob 2m 2m 2m 2m 2m 2m 2m 2m 2   retrograde 3m 3m 3m 3m 3m 3m 3m 3m 3m   Graston R 4m 4m 4m 4m 4m 4m 4m 4m 4m   Neuro Re-Ed            KT             Wrist A/AAROM, TGEs            Yoke splint for R rng sag band                                                             Ther Ex            Wrist A/AAROM 3# and 2#  3x10 #3  3x10 #3 3x10 #3 3x10 #3 3x10 #3 3x10 #3 3x10 #3 3x10 #4 3x10   P/S A/AAROM red  2x10 R 3x10 R 3x10 R 3x10 R 3x10 R 3x10 R 3x10 R 3x10 G 3x10   Foam roll stretch flex/ext            Finklestein stretch            Wrist circles 2.2# ball 2m 2.2# 2m 2.2# 2m 2.2# 2m 2.2# 2m 2.2# 2m 2.2# 2m 2.2# 2m 3.3# 3x10   Powerweb   BPW 3x10 BPW 2x30 BPW 2x30 BPW 2x30 BPW 2x30 BPW 2x30 BPW 2x30 BPW 2x30 Bpw 2x30   Powerweb  simulating opening jar/wrist radial deviation GPW 3x10            Scrub  1m 50% 1m 50% 1m 1m 1m    Weblside rows/ext   Ther Activity            Peg  w. supination         Shelf lifts 5,7.5lbs 1x10   Pinch pins  Blue  3x10 Blue 3 sets Blue 3 sets Blue 3 sets Blue 3 sets Blue Blue 3 sets Blue 3 sets     Work sim lift  B  11# 2x10 #11 3x10 #11 3x10 #11 3x10 #11 3x10 #11 3x10 #11 3x10 #11 3x10    UBE  5m 5m 5m 6m 6m 6m 6m 6m 5m               Modalities            MH R 8m 8m 8m 8m 8m 8m 8m 8m 8m   CP post Post tx 5m 5m 5m 5m 5m              13 14 15 16 17 18 19 20   Manuals 11/14 11/18 11/21 11/25 11/29 12/2 12/4 12/9 12/11 12/16   TPR ext mass 2m 2m    2m 2m 2m  2m   Scar mob 2m 2m 4m 4m 4m 3m 3m 2m 2m 2m   retrograde 3m 3m 4m 4m 4m 3m 3m 3m 3m 3m   Graston R 4m 4m 4m 4m 4m 4m 4m 4m 4m 4m   Neuro Re-Ed             KT       DW       Wrist A/AAROM, TGEs             Yoke splint for R rng sag band                                                                   Ther Ex             Wrist A/AAROM 3# and 2#  3x10 3#  3x10 3#  3x10 4#   3x10 #3 3x10 3#    3x10 3#  3x10 #3  3x10 #3  3x10 3#  3x10   P/S A/AAROM red  2x10 Red  3x10 Red  3x10 SM 3x10  SM  3x10 red  2x10 LG  3x10 LG  3x10 R  3x10   Foam roll stretch flex/ext        2x10     Finklestein stretch    x10 x10   x20      Wrist circles 2.2# ball 2m Marbles 2 m 2.2#   ball  2m Marbles 2m 2.2# 2m 2.2#  2m 2.2#  2m 2.2#  2m 2.2#  2m 2.2#  ball  2m    Powerweb   BPW 3x10 BPW  2x30 BPW  2x30 BPW 2x30 BPW 2x30 BPW 2x30 BPW 2x30 BlackPW 3x10 BPW  5x10  BPW  3x10   Powerweb  simulating opening jar/wrist radial deviation GPW 3x10        YPW  3x10 GPW  10x3  Wrist/forearm stabilization with little oscillations GPW 3x10    Scrub   1m  50%            Ther Activity             Peg  w. supination              Pinch pins  Blue  3x10 Blue 5 sets, varying grasps Black  1 set  Blue  2 sets  black  3sets Blue  3 sets G  3sets  G 3 sets, varying grasps Blue   5 sets Blue  3x10    Work sim lift  B  11# 2x10 11#  3x10  11#  3x10  #11 3x10 ball 11#  3x10  ball 11# ball  3x10 #11 ball 3x10 11#  2x10 11#  2x10   UBE  5m 5m   6m 6m 6m level 1 6m level 2.5 6m 5m                Modalities             MH R 8m 8m 8m 8m 8m 6m 6m 8m 6m 6m   CP post Post tx 5m 5m 5m 5m 5m 5m 5m

## 2025-01-27 ENCOUNTER — APPOINTMENT (OUTPATIENT)
Dept: OCCUPATIONAL THERAPY | Facility: CLINIC | Age: 40
End: 2025-01-27
Payer: OTHER MISCELLANEOUS

## 2025-01-29 ENCOUNTER — APPOINTMENT (OUTPATIENT)
Dept: OCCUPATIONAL THERAPY | Facility: CLINIC | Age: 40
End: 2025-01-29
Payer: OTHER MISCELLANEOUS

## 2025-01-31 ENCOUNTER — OFFICE VISIT (OUTPATIENT)
Dept: OCCUPATIONAL THERAPY | Facility: CLINIC | Age: 40
End: 2025-01-31
Payer: OTHER MISCELLANEOUS

## 2025-01-31 DIAGNOSIS — S69.81XD INJURY OF TRIANGULAR FIBROCARTILAGE COMPLEX (TFCC) OF RIGHT WRIST, SUBSEQUENT ENCOUNTER: Primary | ICD-10-CM

## 2025-01-31 PROCEDURE — 97110 THERAPEUTIC EXERCISES: CPT | Performed by: OCCUPATIONAL THERAPIST

## 2025-01-31 PROCEDURE — 97140 MANUAL THERAPY 1/> REGIONS: CPT | Performed by: OCCUPATIONAL THERAPIST

## 2025-01-31 PROCEDURE — 97530 THERAPEUTIC ACTIVITIES: CPT | Performed by: OCCUPATIONAL THERAPIST

## 2025-01-31 NOTE — PROGRESS NOTES
Daily Note     Today's date: 2025  Patient name: Juhi Tena  : 1985  MRN: [id number]  Referring provider: Josue Barker PA-C  Dx:   Encounter Diagnosis     ICD-10-CM    1. 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      Objective: See treatment diary below      Assessment: Tolerated treatment fair. Patient reported an increase in pain from last weeks session.  Pt. Managing pain with OTC ibuprofin.  Upgrades were made for work hardening and pt. Unable to complete due to complaint of pain.      Plan: Continue per plan of care.      Precautions:   Sx 24  F/u with MD 24       CO-MORBIDITIES:  HEP ACCESS CODE:   FOTO Completed On:     POC Expires Reeval for Medicare to be completed Unit LImit Auth Expiration Date PT/OT/STVisit Limit     By visit 27  NA   na    Completed on visit               ows at top of treatment diary - top left boxes should look like the example below       TREATMENT DIARY  Auth Status                   Visit # 15   Visits Remaining 1       20           Manuals            TPR ext mass            Scar mob 2m           retrograde 3m           Graston R 4m           Neuro Re-Ed            KT             Wrist A/AAROM, TGEs            Yoke splint for R rng sag band                                                             Ther Ex            Wrist A/AAROM 3#   3x10 #3 3x10 #3 3x10 #3 3x10 #3 3x10 #3 3x10 #3 3x10 #3 3x10 #4 3x10   P/S A/AAROM red  2x10 R 3x10 R 3x10 R 3x10 R 3x10 R 3x10 R 3x10 R 3x10 G 3x10   Foam roll stretch flex/ext            Finklestein stretch            Wrist circles 2.2# ball 2m 2.2# 2m 2.2# 2m 2.2# 2m 2.2# 2m 2.2# 2m 2.2# 2m 2.2# 2m 3.3# 3x10   Powerweb   BPW 3x10 BPW 2x30 BPW 2x30 BPW 2x30 BPW 2x30 BPW 2x30 BPW 2x30 BPW 2x30 Bpw 2x30   Powerweb  simulating opening jar/wrist radial deviation  GPW 3x10            Scrub   1m 50% 1m 1m 1m    Weblside rows/ext   Ther Activity            Peg  w. supination         Shelf lifts 5,7.5lbs 1x10   Pinch pins  Blue  3x10 Blue 3 sets Blue 3 sets Blue 3 sets Blue 3 sets Blue Blue 3 sets Blue 3 sets     Work sim lift  B  11# 2x10 #11 3x10 #11 3x10 #11 3x10 #11 3x10 #11 3x10 #11 3x10 #11 3x10    UBE  5m 5m 5m 6m 6m 6m 6m 6m 5m               Modalities            MH R 8m 8m 8m 8m 8m 8m 8m 8m 8m   CP post Post tx 5m 5m 5m 5m 5m            20 21 out of 24 22 out of 24 23 out of 24 1 out of 30 2 /30 3/30 4/30 5/30   Manuals 12/18 12/23 12/27 12/30 1/8 1/13 1/15 1/20 1/22   TPR ext mass            Scar mob 2m 2m 2m 2m 2m 2m 2m 2m 2   retrograde 3m 3m 3m 3m 3m 3m 3m 3m 3m   Graston R 4m 4m 4m 4m 4m 4m 4m 4m 4m   Neuro Re-Ed            KT             Wrist A/AAROM, TGEs            Yoke splint for R rng sag band                                                             Ther Ex            Wrist A/AAROM 3# and 2#  3x10 #3 3x10 #3 3x10 #3 3x10 #3 3x10 #3 3x10 #3 3x10 #3 3x10 #4 3x10   P/S A/AAROM red  2x10 R 3x10 R 3x10 R 3x10 R 3x10 R 3x10 R 3x10 R 3x10 G 3x10   Foam roll stretch flex/ext            Finklestein stretch            Wrist circles 2.2# ball 2m 2.2# 2m 2.2# 2m 2.2# 2m 2.2# 2m 2.2# 2m 2.2# 2m 2.2# 2m 3.3# 3x10   Powerweb   BPW 3x10 BPW 2x30 BPW 2x30 BPW 2x30 BPW 2x30 BPW 2x30 BPW 2x30 BPW 2x30 Bpw 2x30   Powerweb  simulating opening jar/wrist radial deviation GPW 3x10            Scrub  1m 50% 1m 50% 1m 1m 1m    Weblside rows/ext   Ther Activity            Peg  w. supination         Shelf lifts 5,7.5lbs 1x10   Pinch pins  Blue  3x10 Blue 3 sets Blue 3 sets Blue 3 sets Blue 3 sets Blue Blue 3 sets Blue 3 sets     Work sim lift  B  11# 2x10 #11 3x10 #11 3x10 #11 3x10 #11 3x10 #11 3x10 #11 3x10 #11 3x10    UBE  5m 5m 5m 6m 6m 6m 6m 6m 5m               Modalities            MH R 8m 8m 8m 8m 8m 8m 8m 8m 8m   CP post Post tx 5m 5m 5m 5m 5m               13 14 15 16 17 18 19 20   Manuals 11/14 11/18 11/21 11/25 11/29 12/2 12/4 12/9 12/11 12/16   TPR ext mass 2m 2m    2m 2m 2m  2m   Scar mob 2m 2m 4m 4m 4m 3m 3m 2m 2m 2m   retrograde 3m 3m 4m 4m 4m 3m 3m 3m 3m 3m   Graston R 4m 4m 4m 4m 4m 4m 4m 4m 4m 4m   Neuro Re-Ed             KT       DW       Wrist A/AAROM, TGEs             Yoke splint for R rng sag band                                                                   Ther Ex             Wrist A/AAROM 3# and 2#  3x10 3#  3x10 3#  3x10 4#   3x10 #3 3x10 3#    3x10 3#  3x10 #3  3x10 #3  3x10 3#  3x10   P/S A/AAROM red  2x10 Red  3x10 Red  3x10 SM 3x10  SM  3x10 red  2x10 LG  3x10 LG  3x10 R  3x10   Foam roll stretch flex/ext        2x10     Finklestein stretch    x10 x10   x20      Wrist circles 2.2# ball 2m Marbles 2 m 2.2#   ball  2m Marbles 2m 2.2# 2m 2.2#  2m 2.2#  2m 2.2#  2m 2.2#  2m 2.2#  ball  2m    Powerweb   BPW 3x10 BPW  2x30 BPW  2x30 BPW 2x30 BPW 2x30 BPW 2x30 BPW 2x30 BlackPW 3x10 BPW  5x10 BPW  3x10   Powerweb  simulating opening jar/wrist radial deviation GPW 3x10        YPW  3x10 GPW  10x3  Wrist/forearm stabilization with little oscillations GPW 3x10    Scrub   1m  50%            Ther Activity             Peg  w. supination              Pinch pins  Blue  3x10 Blue 5 sets, varying grasps Black  1 set  Blue  2 sets  black  3sets Blue  3 sets G  3sets  G 3 sets, varying grasps Blue   5 sets Blue  3x10    Work sim lift  B  11# 2x10 11#  3x10  11#  3x10  #11 3x10 ball 11#  3x10  ball 11# ball  3x10 #11 ball 3x10 11#  2x10 11#  2x10   UBE  5m 5m   6m 6m 6m level 1 6m level 2.5 6m 5m                Modalities             MH R 8m 8m 8m 8m 8m 6m 6m 8m 6m 6m   CP post Post tx 5m 5m 5m 5m 5m 5m 5m

## 2025-02-10 ENCOUNTER — OFFICE VISIT (OUTPATIENT)
Dept: OCCUPATIONAL THERAPY | Facility: CLINIC | Age: 40
End: 2025-02-10
Payer: OTHER MISCELLANEOUS

## 2025-02-10 DIAGNOSIS — S69.81XD INJURY OF TRIANGULAR FIBROCARTILAGE COMPLEX (TFCC) OF RIGHT WRIST, SUBSEQUENT ENCOUNTER: Primary | ICD-10-CM

## 2025-02-10 PROCEDURE — 97530 THERAPEUTIC ACTIVITIES: CPT | Performed by: OCCUPATIONAL THERAPIST

## 2025-02-10 PROCEDURE — 97140 MANUAL THERAPY 1/> REGIONS: CPT | Performed by: OCCUPATIONAL THERAPIST

## 2025-02-10 PROCEDURE — 97110 THERAPEUTIC EXERCISES: CPT | Performed by: OCCUPATIONAL THERAPIST

## 2025-02-10 NOTE — PROGRESS NOTES
Daily Note     Today's date: 2/10/2025  Patient name: Juhi Tena  : 1985  MRN: [id number]  Referring provider: Josue Barker PA-C  Dx:   Encounter Diagnosis     ICD-10-CM    1. 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      Objective: See treatment diary below      Assessment: Tolerated treatment fair. Patient has complaint of cracking in the hand. Conitnues with tenderness over the ulnar wrist.    Plan: Continue per plan of care.      Precautions:   Sx 24  F/u with MD 24       CO-MORBIDITIES:  HEP ACCESS CODE:   FOTO Completed On:     POC Expires Reeval for Medicare to be completed Unit LImit Auth Expiration Date PT/OT/STVisit Limit     By visit 27  NA   na    Completed on visit               ows at top of treatment diary - top left boxes should look like the example below       TREATMENT DIARY  Auth Status                   Visit # 15   Visits Remaining 1       20           Manuals  2/10          TPR ext mass            Scar mob 2m 2m          retrograde 3m 3m          Graston R 4m 4m          Neuro Re-Ed            KT             Wrist A/AAROM, TGEs            Yoke splint for R rng sag band                                                             Ther Ex            Wrist A/AAROM 3#   3x10 #3 3x10 #3 3x10 #3 3x10 #3 3x10 #3 3x10 #3 3x10 #3 3x10 #4 3x10   P/S A/AAROM red  2x10 R 3x10 R 3x10 R 3x10 R 3x10 R 3x10 R 3x10 R 3x10 G 3x10   Foam roll stretch flex/ext            Finklestein stretch            Wrist circles 2.2# ball 2m 2.2# 2m 2.2# 2m 2.2# 2m 2.2# 2m 2.2# 2m 2.2# 2m 2.2# 2m 3.3# 3x10   Powerweb   BPW 3x10 BPW 2x30 BPW 2x30 BPW 2x30 BPW 2x30 BPW 2x30 BPW 2x30 BPW 2x30 Bpw 2x30   Powerweb  simulating opening jar/wrist radial deviation GPW 3x10 Gpw 3x10           Scrub    1m 1m 1m    Weblside rows/ext   Ther Activity            Peg  w. supination          Shelf lifts 5,7.5lbs 1x10   Pinch pins  Blue  3x10 Blue 3 sets Blue 3 sets Blue 3 sets Blue 3 sets Blue Blue 3 sets Blue 3 sets     Work sim lift  B  11# 2x10 #11 3x10 #11 3x10 #11 3x10 #11 3x10 #11 3x10 #11 3x10 #11 3x10    UBE  5m 5m 5m 6m 6m 6m 6m 6m 5m               Modalities            MH R 8m 8m 8m 8m 8m 8m 8m 8m 8m   CP post Post tx 5m 5m 5m 5m 5m            20 21 out of 24 22 out of 24 23 out of 24 1 out of 30 2 /30 3/30 4/30 5/30   Manuals 12/18 12/23 12/27 12/30 1/8 1/13 1/15 1/20 1/22   TPR ext mass            Scar mob 2m 2m 2m 2m 2m 2m 2m 2m 2   retrograde 3m 3m 3m 3m 3m 3m 3m 3m 3m   Graston R 4m 4m 4m 4m 4m 4m 4m 4m 4m   Neuro Re-Ed            KT             Wrist A/AAROM, TGEs            Yoke splint for R rng sag band                                                             Ther Ex            Wrist A/AAROM 3# and 2#  3x10 #3 3x10 #3 3x10 #3 3x10 #3 3x10 #3 3x10 #3 3x10 #3 3x10 #4 3x10   P/S A/AAROM red  2x10 R 3x10 R 3x10 R 3x10 R 3x10 R 3x10 R 3x10 R 3x10 G 3x10   Foam roll stretch flex/ext            Finklestein stretch            Wrist circles 2.2# ball 2m 2.2# 2m 2.2# 2m 2.2# 2m 2.2# 2m 2.2# 2m 2.2# 2m 2.2# 2m 3.3# 3x10   Powerweb   BPW 3x10 BPW 2x30 BPW 2x30 BPW 2x30 BPW 2x30 BPW 2x30 BPW 2x30 BPW 2x30 Bpw 2x30   Powerweb  simulating opening jar/wrist radial deviation GPW 3x10            Scrub  1m 50% 1m 50% 1m 1m 1m    Weblside rows/ext   Ther Activity            Peg  w. supination         Shelf lifts 5,7.5lbs 1x10   Pinch pins  Blue  3x10 Blue 3 sets Blue 3 sets Blue 3 sets Blue 3 sets Blue Blue 3 sets Blue 3 sets     Work sim lift  B  11# 2x10 #11 3x10 #11 3x10 #11 3x10 #11 3x10 #11 3x10 #11 3x10 #11 3x10    UBE  5m 5m 5m 6m 6m 6m 6m 6m 5m               Modalities            MH R 8m 8m 8m 8m 8m 8m 8m 8m 8m   CP post Post tx 5m 5m 5m 5m 5m              13 14 15 16 17 18 19 20   Manuals 11/14 11/18 11/21 11/25 11/29 12/2 12/4 12/9 12/11 12/16   TPR ext mass 2m 2m    2m 2m 2m   2m   Scar mob 2m 2m 4m 4m 4m 3m 3m 2m 2m 2m   retrograde 3m 3m 4m 4m 4m 3m 3m 3m 3m 3m   Graston R 4m 4m 4m 4m 4m 4m 4m 4m 4m 4m   Neuro Re-Ed             KT       DW       Wrist A/AAROM, TGEs             Yoke splint for R rng sag band                                                                   Ther Ex             Wrist A/AAROM 3# and 2#  3x10 3#  3x10 3#  3x10 4#   3x10 #3 3x10 3#    3x10 3#  3x10 #3  3x10 #3  3x10 3#  3x10   P/S A/AAROM red  2x10 Red  3x10 Red  3x10 SM 3x10  SM  3x10 red  2x10 LG  3x10 LG  3x10 R  3x10   Foam roll stretch flex/ext        2x10     Finklestein stretch    x10 x10   x20      Wrist circles 2.2# ball 2m Marbles 2 m 2.2#   ball  2m Marbles 2m 2.2# 2m 2.2#  2m 2.2#  2m 2.2#  2m 2.2#  2m 2.2#  ball  2m    Powerweb   BPW 3x10 BPW  2x30 BPW  2x30 BPW 2x30 BPW 2x30 BPW 2x30 BPW 2x30 BlackPW 3x10 BPW  5x10 BPW  3x10   Powerweb  simulating opening jar/wrist radial deviation GPW 3x10        YPW  3x10 GPW  10x3  Wrist/forearm stabilization with little oscillations GPW 3x10    Scrub   1m  50%            Ther Activity             Peg  w. supination              Pinch pins  Blue  3x10 Blue 5 sets, varying grasps Black  1 set  Blue  2 sets  black  3sets Blue  3 sets G  3sets  G 3 sets, varying grasps Blue   5 sets Blue  3x10    Work sim lift  B  11# 2x10 11#  3x10  11#  3x10  #11 3x10 ball 11#  3x10  ball 11# ball  3x10 #11 ball 3x10 11#  2x10 11#  2x10   UBE  5m 5m   6m 6m 6m level 1 6m level 2.5 6m 5m                Modalities             MH R 8m 8m 8m 8m 8m 6m 6m 8m 6m 6m   CP post Post tx 5m 5m 5m 5m 5m 5m 5m

## 2025-02-12 ENCOUNTER — OFFICE VISIT (OUTPATIENT)
Dept: OCCUPATIONAL THERAPY | Facility: CLINIC | Age: 40
End: 2025-02-12
Payer: OTHER MISCELLANEOUS

## 2025-02-12 DIAGNOSIS — S69.81XD INJURY OF TRIANGULAR FIBROCARTILAGE COMPLEX (TFCC) OF RIGHT WRIST, SUBSEQUENT ENCOUNTER: Primary | ICD-10-CM

## 2025-02-12 PROCEDURE — 97530 THERAPEUTIC ACTIVITIES: CPT | Performed by: OCCUPATIONAL THERAPIST

## 2025-02-12 PROCEDURE — 97110 THERAPEUTIC EXERCISES: CPT | Performed by: OCCUPATIONAL THERAPIST

## 2025-02-12 NOTE — PROGRESS NOTES
Daily Note     Today's date: 2025  Patient name: Juhi Tena  : 1985  MRN: [id number]  Referring provider: Josue Barker PA-C  Dx:   Encounter Diagnosis     ICD-10-CM    1. 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 Pain is 2/10      Objective: See treatment diary below      Assessment: Tolerated treatment fair. Patient  has continued poor tolerance to lifting and therapeutic activity .   She hastender ext mass that improved with extensor stretching . Pt still guards at times that is increasing ext muscle pain .      2 R/L=40/62    Plan: Continue per plan of care. /REEVAL      Precautions:   Sx 24  F/u with MD 24       CO-MORBIDITIES:  HEP ACCESS CODE:   FOTO Completed On:     POC Expires Reeval for Medicare to be completed Unit LImit Auth Expiration Date PT/OT/STVisit Limit     By visit 27  NA   na    Completed on visit               ows at top of treatment diary - top left boxes should look like the example below       TREATMENT DIARY  Auth Status                   Visit # 15   Visits Remaining 1       20 21 22         Manuals 1/31 2/10 2/12         TPR ext mass            Scar mob 2m 2m 2m         retrograde 3m 3m 2m         Graston R 4m 4m 4m         Neuro Re-Ed            KT             Wrist A/AAROM, TGEs            Yoke splint for R rng sag band                                                             Ther Ex            Wrist A/AAROM 3#   3x10 #3 3x10 #3 3x10 #3 3x10 #3 3x10 #3 3x10 #3 3x10 #3 3x10 #4 3x10   P/S A/AAROM red  2x10 R 3x10 R 3x10 R 3x10 R 3x10 R 3x10 R 3x10 R 3x10 G 3x10   Foam roll stretch flex/ext            Finklestein stretch            Wrist circles 2.2# ball 2m 2.2# 2m 3.3# 2m 2.2# 2m 2.2# 2m 2.2# 2m 2.2# 2m 2.2# 2m 3.3# 3x10   Powerweb   BPW 3x10 BPW 2x30 BPW 2x30 BPW 2x30 BPW 2x30 BPW 2x30 BPW 2x30 BPW 2x30 Bpw 2x30    Powerweb  simulating opening jar/wrist radial deviation GPW 3x10 Gpw 3x10           Scrub      1m 1m    Weblside rows/ext   Ther Activity            Peg  w. supination         Shelf lifts 5,7.5lbs 1x10   Pinch pins  Blue  3x10 Blue 3 sets Black 2 sets Blue 3 sets Blue 3 sets Blue Blue 3 sets Blue 3 sets     Work sim lift  B  11# 2x10 #11 3x10 15# kb  2x10 #11 3x10 #11 3x10 #11 3x10 #11 3x10 #11 3x10    UBE  5m 5m 6m 6m 6m 6m 6m 6m 5m   Web slide row    Black  3x10         Modalities            MH R 8m 8m 5m 8m 8m 8m 8m 8m 8m   CP post Post tx 5m 5m 5m 5m 5m            20 21 out of 24 22 out of 24 23 out of 24 1 out of 30 2 /30 3/30 4/30 5/30   Manuals 12/18 12/23 12/27 12/30 1/8 1/13 1/15 1/20 1/22   TPR ext mass            Scar mob 2m 2m 2m 2m 2m 2m 2m 2m 2   retrograde 3m 3m 3m 3m 3m 3m 3m 3m 3m   Graston R 4m 4m 4m 4m 4m 4m 4m 4m 4m   Neuro Re-Ed            KT             Wrist A/AAROM, TGEs            Yoke splint for R rng sag band                                                             Ther Ex            Wrist A/AAROM 3# and 2#  3x10 #3 3x10 #3 3x10 #3 3x10 #3 3x10 #3 3x10 #3 3x10 #3 3x10 #4 3x10   P/S A/AAROM red  2x10 R 3x10 R 3x10 R 3x10 R 3x10 R 3x10 R 3x10 R 3x10 G 3x10   Foam roll stretch flex/ext            Finklestein stretch            Wrist circles 2.2# ball 2m 2.2# 2m 2.2# 2m 2.2# 2m 2.2# 2m 2.2# 2m 2.2# 2m 2.2# 2m 3.3# 3x10   Powerweb   BPW 3x10 BPW 2x30 BPW 2x30 BPW 2x30 BPW 2x30 BPW 2x30 BPW 2x30 BPW 2x30 Bpw 2x30   Powerweb  simulating opening jar/wrist radial deviation GPW 3x10            Scrub  1m 50% 1m 50% 1m 1m 1m    Weblside rows/ext   Ther Activity            Peg  w. supination         Shelf lifts 5,7.5lbs 1x10   Pinch pins  Blue  3x10 Blue 3 sets Blue 3 sets Blue 3 sets Blue 3 sets Blue Blue 3 sets Blue 3 sets     Work sim lift  B  11# 2x10 #11 3x10 #11 3x10 #11 3x10 #11 3x10 #11 3x10 #11 3x10 #11 3x10    UBE  5m 5m 5m 6m 6m 6m 6m 6m 5m                Modalities            MH R 8m 8m 8m 8m 8m 8m 8m 8m 8m   CP post Post tx 5m 5m 5m 5m 5m              13 14 15 16 17 18 19 20   Manuals 11/14 11/18 11/21 11/25 11/29 12/2 12/4 12/9 12/11 12/16   TPR ext mass 2m 2m    2m 2m 2m  2m   Scar mob 2m 2m 4m 4m 4m 3m 3m 2m 2m 2m   retrograde 3m 3m 4m 4m 4m 3m 3m 3m 3m 3m   Graston R 4m 4m 4m 4m 4m 4m 4m 4m 4m 4m   Neuro Re-Ed             KT       DW       Wrist A/AAROM, TGEs             Yoke splint for R rng sag band                                                                   Ther Ex             Wrist A/AAROM 3# and 2#  3x10 3#  3x10 3#  3x10 4#   3x10 #3 3x10 3#    3x10 3#  3x10 #3  3x10 #3  3x10 3#  3x10   P/S A/AAROM red  2x10 Red  3x10 Red  3x10 SM 3x10  SM  3x10 red  2x10 LG  3x10 LG  3x10 R  3x10   Foam roll stretch flex/ext        2x10     Finklestein stretch    x10 x10   x20      Wrist circles 2.2# ball 2m Marbles 2 m 2.2#   ball  2m Marbles 2m 2.2# 2m 2.2#  2m 2.2#  2m 2.2#  2m 2.2#  2m 2.2#  ball  2m    Powerweb   BPW 3x10 BPW  2x30 BPW  2x30 BPW 2x30 BPW 2x30 BPW 2x30 BPW 2x30 BlackPW 3x10 BPW  5x10 BPW  3x10   Powerweb  simulating opening jar/wrist radial deviation GPW 3x10        YPW  3x10 GPW  10x3  Wrist/forearm stabilization with little oscillations GPW 3x10    Scrub   1m  50%            Ther Activity             Peg  w. supination              Pinch pins  Blue  3x10 Blue 5 sets, varying grasps Black  1 set  Blue  2 sets  black  3sets Blue  3 sets G  3sets  G 3 sets, varying grasps Blue   5 sets Blue  3x10    Work sim lift  B  11# 2x10 11#  3x10  11#  3x10  #11 3x10 ball 11#  3x10  ball 11# ball  3x10 #11 ball 3x10 11#  2x10 11#  2x10   UBE  5m 5m   6m 6m 6m level 1 6m level 2.5 6m 5m                Modalities             MH R 8m 8m 8m 8m 8m 6m 6m 8m 6m 6m   CP post Post tx 5m 5m 5m 5m 5m 5m 5m

## 2025-02-19 ENCOUNTER — OFFICE VISIT (OUTPATIENT)
Dept: OCCUPATIONAL THERAPY | Facility: CLINIC | Age: 40
End: 2025-02-19
Payer: OTHER MISCELLANEOUS

## 2025-02-19 DIAGNOSIS — S69.81XD INJURY OF TRIANGULAR FIBROCARTILAGE COMPLEX (TFCC) OF RIGHT WRIST, SUBSEQUENT ENCOUNTER: Primary | ICD-10-CM

## 2025-02-19 PROCEDURE — 97140 MANUAL THERAPY 1/> REGIONS: CPT | Performed by: OCCUPATIONAL THERAPIST

## 2025-02-19 PROCEDURE — 97110 THERAPEUTIC EXERCISES: CPT | Performed by: OCCUPATIONAL THERAPIST

## 2025-02-19 PROCEDURE — 97530 THERAPEUTIC ACTIVITIES: CPT | Performed by: OCCUPATIONAL THERAPIST

## 2025-02-19 NOTE — PROGRESS NOTES
Daily Note     Today's date: 2025  Patient name: Juhi Tena  : 1985  MRN: [id number]  Referring provider: Josue Barker PA-C  Dx:   Encounter Diagnosis     ICD-10-CM    1. 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        Objective: See treatment diary below      Assessment: Tolerated treatment fair. Patient has continued pain and difficulty with lifting heavier.  Active clicking in wrist with movement.       2 R/L=40/62    Plan: Upgrade POC, work hardening     Precautions:   Sx 24  F/u with MD 24       CO-MORBIDITIES:  HEP ACCESS CODE:   FOTO Completed On:     POC Expires Reeval for Medicare to be completed Unit LImit Auth Expiration Date PT/OT/STVisit Limit     By visit 27  NA   na    Completed on visit               ows at top of treatment diary - top left boxes should look like the example below       TREATMENT DIARY  Auth Status                   Visit # 15   Visits Remaining 1       20 21 22 23        Manuals 1/31 2/10 2/12 2/19        TPR ext mass            Scar mob 2m 2m 2m 2m        retrograde 3m 3m 2m 2m        Graston R 4m 4m 4m 4m        Neuro Re-Ed            KT             Wrist A/AAROM, TGEs            Yoke splint for R rng sag band                                                             Ther Ex            Wrist A/AAROM 3#   3x10 #3 3x10 #3 3x10 #3 3x10 #3 3x10 #3 3x10 #3 3x10 #3 3x10 #4 3x10   P/S A/AAROM red  2x10 R 3x10 R 3x10 R 3x10 R 3x10 R 3x10 R 3x10 R 3x10 G 3x10   Foam roll stretch flex/ext            Finklestein stretch            Wrist circles 2.2# ball 2m 2.2# 2m 3.3# 2m 2.2# 2m 2.2# 2m 2.2# 2m 2.2# 2m 2.2# 2m 3.3# 3x10   Powerweb   BPW 3x10 BPW 2x30 BPW 2x30 BPW 2x30 BPW 2x30 BPW 2x30 BPW 2x30 BPW 2x30 Bpw 2x30   Powerweb  simulating opening jar/wrist radial deviation GPW 3x10 Gpw 3x10           Scrub      1m 1m    Weblside rows/ext   Ther  Activity            Peg  w. supination         Shelf lifts 5,7.5lbs 1x10   Pinch pins  Blue  3x10 Blue 3 sets Black 2 sets Blue 3 sets Blue 3 sets Blue Blue 3 sets Blue 3 sets     Work sim lift  B  11# 2x10 #11 3x10 15# kb  2x10 #11 3x10 #11 3x10 #11 3x10 #11 3x10 #11 3x10    UBE  5m 5m 6m 6m 6m 6m 6m 6m 5m   Web slide row    Black  3x10         Modalities            MH R 8m 8m 5m 8m 8m 8m 8m 8m 8m   CP post Post tx 5m 5m 5m 5m 5m            20 21 out of 24 22 out of 24 23 out of 24 1 out of 30 2 /30 3/30 4/30 5/30   Manuals 12/18 12/23 12/27 12/30 1/8 1/13 1/15 1/20 1/22   TPR ext mass            Scar mob 2m 2m 2m 2m 2m 2m 2m 2m 2   retrograde 3m 3m 3m 3m 3m 3m 3m 3m 3m   Graston R 4m 4m 4m 4m 4m 4m 4m 4m 4m   Neuro Re-Ed            KT             Wrist A/AAROM, TGEs            Yoke splint for R rng sag band                                                             Ther Ex            Wrist A/AAROM 3# and 2#  3x10 #3 3x10 #3 3x10 #3 3x10 #3 3x10 #3 3x10 #3 3x10 #3 3x10 #4 3x10   P/S A/AAROM red  2x10 R 3x10 R 3x10 R 3x10 R 3x10 R 3x10 R 3x10 R 3x10 G 3x10   Foam roll stretch flex/ext            Finklestein stretch            Wrist circles 2.2# ball 2m 2.2# 2m 2.2# 2m 2.2# 2m 2.2# 2m 2.2# 2m 2.2# 2m 2.2# 2m 3.3# 3x10   Powerweb   BPW 3x10 BPW 2x30 BPW 2x30 BPW 2x30 BPW 2x30 BPW 2x30 BPW 2x30 BPW 2x30 Bpw 2x30   Powerweb  simulating opening jar/wrist radial deviation GPW 3x10            Scrub  1m 50% 1m 50% 1m 1m 1m    Weblside rows/ext   Ther Activity            Peg  w. supination         Shelf lifts 5,7.5lbs 1x10   Pinch pins  Blue  3x10 Blue 3 sets Blue 3 sets Blue 3 sets Blue 3 sets Blue Blue 3 sets Blue 3 sets     Work sim lift  B  11# 2x10 #11 3x10 #11 3x10 #11 3x10 #11 3x10 #11 3x10 #11 3x10 #11 3x10    UBE  5m 5m 5m 6m 6m 6m 6m 6m 5m               Modalities            MH R 8m 8m 8m 8m 8m 8m 8m 8m 8m   CP post Post tx 5m 5m 5m 5m 5m              13 14 15 16 17 18 19 20   Manuals 11/14  11/18 11/21 11/25 11/29 12/2 12/4 12/9 12/11 12/16   TPR ext mass 2m 2m    2m 2m 2m  2m   Scar mob 2m 2m 4m 4m 4m 3m 3m 2m 2m 2m   retrograde 3m 3m 4m 4m 4m 3m 3m 3m 3m 3m   Graston R 4m 4m 4m 4m 4m 4m 4m 4m 4m 4m   Neuro Re-Ed             KT       DW       Wrist A/AAROM, TGEs             Yoke splint for R rng sag band                                                                   Ther Ex             Wrist A/AAROM 3# and 2#  3x10 3#  3x10 3#  3x10 4#   3x10 #3 3x10 3#    3x10 3#  3x10 #3  3x10 #3  3x10 3#  3x10   P/S A/AAROM red  2x10 Red  3x10 Red  3x10 SM 3x10  SM  3x10 red  2x10 LG  3x10 LG  3x10 R  3x10   Foam roll stretch flex/ext        2x10     Finklestein stretch    x10 x10   x20      Wrist circles 2.2# ball 2m Marbles 2 m 2.2#   ball  2m Marbles 2m 2.2# 2m 2.2#  2m 2.2#  2m 2.2#  2m 2.2#  2m 2.2#  ball  2m    Powerweb   BPW 3x10 BPW  2x30 BPW  2x30 BPW 2x30 BPW 2x30 BPW 2x30 BPW 2x30 BlackPW 3x10 BPW  5x10 BPW  3x10   Powerweb  simulating opening jar/wrist radial deviation GPW 3x10        YPW  3x10 GPW  10x3  Wrist/forearm stabilization with little oscillations GPW 3x10    Scrub   1m  50%            Ther Activity             Peg  w. supination              Pinch pins  Blue  3x10 Blue 5 sets, varying grasps Black  1 set  Blue  2 sets  black  3sets Blue  3 sets G  3sets  G 3 sets, varying grasps Blue   5 sets Blue  3x10    Work sim lift  B  11# 2x10 11#  3x10  11#  3x10  #11 3x10 ball 11#  3x10  ball 11# ball  3x10 #11 ball 3x10 11#  2x10 11#  2x10   UBE  5m 5m   6m 6m 6m level 1 6m level 2.5 6m 5m                Modalities             MH R 8m 8m 8m 8m 8m 6m 6m 8m 6m 6m   CP post Post tx 5m 5m 5m 5m 5m 5m 5m

## 2025-02-21 ENCOUNTER — OFFICE VISIT (OUTPATIENT)
Dept: OCCUPATIONAL THERAPY | Facility: CLINIC | Age: 40
End: 2025-02-21
Payer: OTHER MISCELLANEOUS

## 2025-02-21 DIAGNOSIS — S69.81XD INJURY OF TRIANGULAR FIBROCARTILAGE COMPLEX (TFCC) OF RIGHT WRIST, SUBSEQUENT ENCOUNTER: Primary | ICD-10-CM

## 2025-02-21 PROCEDURE — 97110 THERAPEUTIC EXERCISES: CPT | Performed by: OCCUPATIONAL THERAPIST

## 2025-02-21 NOTE — PROGRESS NOTES
Daily Note     Today's date: 2025  Patient name: Juhi Tena  : 1985  MRN: [id number]  Referring provider: Josue Barker PA-C  Dx:   Encounter Diagnosis     ICD-10-CM    1. 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      Objective: See treatment diary below      Assessment: Tolerated treatment fair. Patient has continued pain with ulnar side of wrist.  She is progressing with heavy lifitng with less pain.   2 R/L=40/62    Plan: Upgrade POC, work hardening     Precautions:   Sx 24  F/u with MD 24       CO-MORBIDITIES:  HEP ACCESS CODE:   FOTO Completed On:     POC Expires Reeval for Medicare to be completed Unit LImit Auth Expiration Date PT/OT/STVisit Limit     By visit 27  NA   na    Completed on visit               ows at top of treatment diary - top left boxes should look like the example below       TREATMENT DIARY  Auth Status                   Visit # 15   Visits Remaining 1        22 23 24       Manuals 1/31 2/10 2/12 2/19 2/21       TPR ext mass            Scar mob 2m 2m 2m 2m 2m       retrograde 3m 3m 2m 2m 2m       Graston R 4m 4m 4m 4m 4m       Neuro Re-Ed            KT             Wrist A/AAROM, TGEs            Yoke splint for R rng sag band                                                             Ther Ex            Wrist A/AAROM 3#   3x10 #3 3x10 #3 3x10 #3 3x10 #3 3x10 #3 3x10 #3 3x10 #3 3x10 #4 3x10   P/S A/AAROM red  2x10 R 3x10 R 3x10 R 3x10 R 3x10 R 3x10 R 3x10 R 3x10 G 3x10   Foam roll stretch flex/ext            Finklestein stretch            Wrist circles 2.2# ball 2m 2.2# 2m 3.3# 2m 2.2# 2m 2.2# 2m 2.2# 2m 2.2# 2m 2.2# 2m 3.3# 3x10   Powerweb   BPW 3x10 BPW 2x30 BPW 2x30 BPW 2x30 BPW 2x30 BPW 2x30 BPW 2x30 BPW 2x30 Bpw 2x30   Powerweb  simulating opening jar/wrist radial deviation GPW 3x10 Gpw 3x10           Scrub      1m  1m    Weblside rows/ext   Ther Activity            Peg  w. supination         Shelf lifts 5,7.5lbs 1x10   Pinch pins  Blue  3x10 Blue 3 sets Black 2 sets Blue 3 sets Blue 3 sets Blue Blue 3 sets Blue 3 sets     Work sim lift  B  11# 2x10 #11 3x10 15# kb  2x10 #11 3x10 #11 3x10 #11 3x10 #11 3x10 #11 3x10    UBE  5m 5m 6m 6m 6m 6m 6m 6m 5m   Web slide row    Black  3x10         Modalities            MH R 8m 8m 5m 8m 8m 8m 8m 8m 8m   CP post Post tx 5m 5m 5m 5m 5m            20 21 out of 24 22 out of 24 23 out of 24 1 out of 30 2 /30 3/30 4/30 5/30   Manuals 12/18 12/23 12/27 12/30 1/8 1/13 1/15 1/20 1/22   TPR ext mass            Scar mob 2m 2m 2m 2m 2m 2m 2m 2m 2   retrograde 3m 3m 3m 3m 3m 3m 3m 3m 3m   Graston R 4m 4m 4m 4m 4m 4m 4m 4m 4m   Neuro Re-Ed            KT             Wrist A/AAROM, TGEs            Yoke splint for R rng sag band                                                             Ther Ex            Wrist A/AAROM 3# and 2#  3x10 #3 3x10 #3 3x10 #3 3x10 #3 3x10 #3 3x10 #3 3x10 #3 3x10 #4 3x10   P/S A/AAROM red  2x10 R 3x10 R 3x10 R 3x10 R 3x10 R 3x10 R 3x10 R 3x10 G 3x10   Foam roll stretch flex/ext            Finklestein stretch            Wrist circles 2.2# ball 2m 2.2# 2m 2.2# 2m 2.2# 2m 2.2# 2m 2.2# 2m 2.2# 2m 2.2# 2m 3.3# 3x10   Powerweb   BPW 3x10 BPW 2x30 BPW 2x30 BPW 2x30 BPW 2x30 BPW 2x30 BPW 2x30 BPW 2x30 Bpw 2x30   Powerweb  simulating opening jar/wrist radial deviation GPW 3x10            Scrub  1m 50% 1m 50% 1m 1m 1m    Weblside rows/ext   Ther Activity            Peg  w. supination         Shelf lifts 5,7.5lbs 1x10   Pinch pins  Blue  3x10 Blue 3 sets Blue 3 sets Blue 3 sets Blue 3 sets Blue Blue 3 sets Blue 3 sets     Work sim lift  B  11# 2x10 #11 3x10 #11 3x10 #11 3x10 #11 3x10 #11 3x10 #11 3x10 #11 3x10    UBE  5m 5m 5m 6m 6m 6m 6m 6m 5m               Modalities            MH R 8m 8m 8m 8m 8m 8m 8m 8m 8m   CP post Post tx 5m 5m 5m 5m 5m              13 14 15  16 17 18 19 20   Manuals 11/14 11/18 11/21 11/25 11/29 12/2 12/4 12/9 12/11 12/16   TPR ext mass 2m 2m    2m 2m 2m  2m   Scar mob 2m 2m 4m 4m 4m 3m 3m 2m 2m 2m   retrograde 3m 3m 4m 4m 4m 3m 3m 3m 3m 3m   Graston R 4m 4m 4m 4m 4m 4m 4m 4m 4m 4m   Neuro Re-Ed             KT       DW       Wrist A/AAROM, TGEs             Yoke splint for R rng sag band                                                                   Ther Ex             Wrist A/AAROM 3# and 2#  3x10 3#  3x10 3#  3x10 4#   3x10 #3 3x10 3#    3x10 3#  3x10 #3  3x10 #3  3x10 3#  3x10   P/S A/AAROM red  2x10 Red  3x10 Red  3x10 SM 3x10  SM  3x10 red  2x10 LG  3x10 LG  3x10 R  3x10   Foam roll stretch flex/ext        2x10     [place] stretch    x10 x10   x20      Wrist circles 2.2# ball 2m Marbles 2 m 2.2#   ball  2m Marbles 2m 2.2# 2m 2.2#  2m 2.2#  2m 2.2#  2m 2.2#  2m 2.2#  ball  2m    Powerweb   BPW 3x10 BPW  2x30 BPW  2x30 BPW 2x30 BPW 2x30 BPW 2x30 BPW 2x30 BlackPW 3x10 BPW  5x10 BPW  3x10   Powerweb  simulating opening jar/wrist radial deviation GPW 3x10        YPW  3x10 GPW  10x3  Wrist/forearm stabilization with little oscillations GPW 3x10    Scrub   1m  50%            Ther Activity             Peg  w. supination              Pinch pins  Blue  3x10 Blue 5 sets, varying grasps Black  1 set  Blue  2 sets  black  3sets Blue  3 sets G  3sets  G 3 sets, varying grasps Blue   5 sets Blue  3x10    Work sim lift  B  11# 2x10 11#  3x10  11#  3x10  #11 3x10 ball 11#  3x10  ball 11# ball  3x10 #11 ball 3x10 11#  2x10 11#  2x10   UBE  5m 5m   6m 6m 6m level 1 6m level 2.5 6m 5m                Modalities             MH R 8m 8m 8m 8m 8m 6m 6m 8m 6m 6m   CP post Post tx 5m 5m 5m 5m 5m 5m 5m

## 2025-02-24 ENCOUNTER — OFFICE VISIT (OUTPATIENT)
Dept: OCCUPATIONAL THERAPY | Facility: CLINIC | Age: 40
End: 2025-02-24
Payer: OTHER MISCELLANEOUS

## 2025-02-24 DIAGNOSIS — S69.81XD INJURY OF TRIANGULAR FIBROCARTILAGE COMPLEX (TFCC) OF RIGHT WRIST, SUBSEQUENT ENCOUNTER: Primary | ICD-10-CM

## 2025-02-24 PROCEDURE — 97110 THERAPEUTIC EXERCISES: CPT | Performed by: OCCUPATIONAL THERAPIST

## 2025-02-24 NOTE — PROGRESS NOTES
Daily Note     Today's date: 2025  Patient name: Juhi Tena  : 1985  MRN: [id number]  Referring provider: Josue Barker PA-C  Dx:   Encounter Diagnosis     ICD-10-CM    1. 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 Patient has continued pain with ulnar side of wrist.  She is progressing with heavy lifitng with less pain.  Difficulty with heavy lifting.   2 R/L=40/62    Plan: Upgrade POC, work hardening     Precautions:   Sx 24  F/u with MD 24       CO-MORBIDITIES:  HEP ACCESS CODE:   FOTO Completed On:     POC Expires Reeval for Medicare to be completed Unit LImit Auth Expiration Date PT/OT/STVisit Limit     By visit 27  NA   na    Completed on visit               ows at top of treatment diary - top left boxes should look like the example below       TREATMENT DIARY  Auth Status                   Visit # 15   Visits Remaining 1       20 21 22 23 24 25      Manuals 1/31 2/10 2/12 2/19 2/21 2/24      TPR ext mass            Scar mob 2m 2m 2m 2m 2m       retrograde 3m 3m 2m 2m 2m       Graston R 4m 4m 4m 4m 4m       Neuro Re-Ed            KT             Wrist A/AAROM, TGEs            Yoke splint for R rng sag band                                                             Ther Ex            Wrist A/AAROM 3#   3x10 #3 3x10 #3 3x10 #3 3x10 #3 3x10 #4  3x10 #3 3x10 #3 3x10 #4 3x10   P/S A/AAROM red  2x10 R 3x10 R 3x10 R 3x10 R 3x10 G 3x10 R 3x10 R 3x10 G 3x10   Foam roll stretch flex/ext            Finklestein stretch            Wrist circles 2.2# ball 2m 2.2# 2m 3.3# 2m 2.2# 2m 2.2# 2m 2.2# 2m 2.2# 2m 2.2# 2m 3.3# 3x10   Powerweb   BPW 3x10 BPW 2x30 BPW 2x30 BPW 2x30 BPW 2x30 BPW 2x30 BPW 2x30 BPW 2x30 Bpw 2x30   Powerweb  simulating opening jar/wrist radial deviation GPW 3x10 Gpw 3x10            Scrub      1m 1m    Weblside rows/ext   Ther Activity            Peg  w. supination         Shelf lifts 5,7.5lbs 1x10   Pinch pins  Blue  3x10 Blue 3 sets Black 2 sets Blue 3 sets Blue 3 sets Blue Blue 3 sets Blue 3 sets     Work sim lift  B  11# 2x10 #11 3x10 15# kb  2x10 #11 3x10 #11 3x10 #11 3x10 #11 3x10 #11 3x10    UBE  5m 5m 6m 6m 6m 6m 6m 6m 5m   Web slide row    Black  3x10   Black 3x10      Modalities            MH R 8m 8m 5m 8m 8m 8m 8m 8m 8m   CP post Post tx 5m 5m 5m 5m 5m            20 21 out of 24 22 out of 24 23 out of 24 1 out of 30 2 /30 3/30 4/30 5/30   Manuals 12/18 12/23 12/27 12/30 1/8 1/13 1/15 1/20 1/22   TPR ext mass            Scar mob 2m 2m 2m 2m 2m 2m 2m 2m 2   retrograde 3m 3m 3m 3m 3m 3m 3m 3m 3m   Graston R 4m 4m 4m 4m 4m 4m 4m 4m 4m   Neuro Re-Ed            KT             Wrist A/AAROM, TGEs            Yoke splint for R rng sag band                                                             Ther Ex            Wrist A/AAROM 3# and 2#  3x10 #3 3x10 #3 3x10 #3 3x10 #3 3x10 #3 3x10 #3 3x10 #3 3x10 #4 3x10   P/S A/AAROM red  2x10 R 3x10 R 3x10 R 3x10 R 3x10 R 3x10 R 3x10 R 3x10 G 3x10   Foam roll stretch flex/ext            Finklestein stretch            Wrist circles 2.2# ball 2m 2.2# 2m 2.2# 2m 2.2# 2m 2.2# 2m 2.2# 2m 2.2# 2m 2.2# 2m 3.3# 3x10   Powerweb   BPW 3x10 BPW 2x30 BPW 2x30 BPW 2x30 BPW 2x30 BPW 2x30 BPW 2x30 BPW 2x30 Bpw 2x30   Powerweb  simulating opening jar/wrist radial deviation GPW 3x10            Scrub  1m 50% 1m 50% 1m 1m 1m    Weblside rows/ext   Ther Activity            Peg  w. supination         Shelf lifts 5,7.5lbs 1x10   Pinch pins  Blue  3x10 Blue 3 sets Blue 3 sets Blue 3 sets Blue 3 sets Blue Blue 3 sets Blue 3 sets     Work sim lift  B  11# 2x10 #11 3x10 #11 3x10 #11 3x10 #11 3x10 #11 3x10 #11 3x10 #11 3x10    UBE  5m 5m 5m 6m 6m 6m 6m 6m 5m               Modalities            MH R 8m 8m 8m 8m 8m 8m 8m 8m 8m   CP post Post tx 5m 5m  5m 5m 5m              13 14 15 16 17 18 19 20   Manuals 11/14 11/18 11/21 11/25 11/29 12/2 12/4 12/9 12/11 12/16   TPR ext mass 2m 2m    2m 2m 2m  2m   Scar mob 2m 2m 4m 4m 4m 3m 3m 2m 2m 2m   retrograde 3m 3m 4m 4m 4m 3m 3m 3m 3m 3m   Graston R 4m 4m 4m 4m 4m 4m 4m 4m 4m 4m   Neuro Re-Ed             KT       DW       Wrist A/AAROM, TGEs             Yoke splint for R rng sag band                                                                   Ther Ex             Wrist A/AAROM 3# and 2#  3x10 3#  3x10 3#  3x10 4#   3x10 #3 3x10 3#    3x10 3#  3x10 #3  3x10 #3  3x10 3#  3x10   P/S A/AAROM red  2x10 Red  3x10 Red  3x10 SM 3x10  SM  3x10 red  2x10 LG  3x10 LG  3x10 R  3x10   Foam roll stretch flex/ext        2x10     Finklestein stretch    x10 x10   x20      Wrist circles 2.2# ball 2m Marbles 2 m 2.2#   ball  2m Marbles 2m 2.2# 2m 2.2#  2m 2.2#  2m 2.2#  2m 2.2#  2m 2.2#  ball  2m    Powerweb   BPW 3x10 BPW  2x30 BPW  2x30 BPW 2x30 BPW 2x30 BPW 2x30 BPW 2x30 BlackPW 3x10 BPW  5x10 BPW  3x10   Powerweb  simulating opening jar/wrist radial deviation GPW 3x10        YPW  3x10 GPW  10x3  Wrist/forearm stabilization with little oscillations GPW 3x10    Scrub   1m  50%            Ther Activity             Peg  w. supination              Pinch pins  Blue  3x10 Blue 5 sets, varying grasps Black  1 set  Blue  2 sets  black  3sets Blue  3 sets G  3sets  G 3 sets, varying grasps Blue   5 sets Blue  3x10    Work sim lift  B  11# 2x10 11#  3x10  11#  3x10  #11 3x10 ball 11#  3x10  ball 11# ball  3x10 #11 ball 3x10 11#  2x10 11#  2x10   UBE  5m 5m   6m 6m 6m level 1 6m level 2.5 6m 5m                Modalities             MH R 8m 8m 8m 8m 8m 6m 6m 8m 6m 6m   CP post Post tx 5m 5m 5m 5m 5m 5m 5m

## 2025-02-26 ENCOUNTER — OFFICE VISIT (OUTPATIENT)
Dept: OCCUPATIONAL THERAPY | Facility: CLINIC | Age: 40
End: 2025-02-26
Payer: OTHER MISCELLANEOUS

## 2025-02-26 DIAGNOSIS — S69.81XD INJURY OF TRIANGULAR FIBROCARTILAGE COMPLEX (TFCC) OF RIGHT WRIST, SUBSEQUENT ENCOUNTER: Primary | ICD-10-CM

## 2025-02-26 PROCEDURE — 97110 THERAPEUTIC EXERCISES: CPT | Performed by: OCCUPATIONAL THERAPIST

## 2025-02-26 NOTE — PROGRESS NOTES
Daily Note     Today's date: 2025  Patient name: Juhi Tena  : 1985  MRN: [id number]  Referring provider: Josue Barker PA-C  Dx:   Encounter Diagnosis     ICD-10-CM    1. 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 Patient reported dropping a book on her foot following last session due to fatigue and pain of hand from TherEx.  Fatigue with therex, pt. Deconditioned with heavy lifting and body mechanics.    Plan: Upgrade POC, work hardening     Precautions:   Sx 24  F/u with MD 24       CO-MORBIDITIES:  HEP ACCESS CODE:   FOTO Completed On:     POC Expires Reeval for Medicare to be completed Unit LImit Auth Expiration Date PT/OT/STVisit Limit     By visit 27  NA   na    Completed on visit               ows at top of treatment diary - top left boxes should look like the example below       TREATMENT DIARY  Auth Status                   Visit # 15   Visits Remaining 1       20 21 22 23 24 25 26     Manuals 1/31 2/10 2/12 2/19 2/21 2/24 2/26     TPR ext mass            Scar mob 2m 2m 2m 2m 2m       retrograde 3m 3m 2m 2m 2m       Graston R 4m 4m 4m 4m 4m       Neuro Re-Ed            KT             Wrist A/AAROM, TGEs            Yoke splint for R rng sag band                                                             Ther Ex            Wrist A/AAROM 3#   3x10 #3 3x10 #3 3x10 #3 3x10 #3 3x10 #4  3x10 #4 3x10 #3 3x10 #4 3x10   P/S A/AAROM red  2x10 R 3x10 R 3x10 R 3x10 R 3x10 G 3x10 G 3x10 R 3x10 G 3x10   Foam roll stretch flex/ext            Finklestein stretch            Wrist circles 2.2# ball 2m 2.2# 2m 3.3# 2m 2.2# 2m 2.2# 2m 2.2# 2m 2.2# 2m 2.2# 2m 3.3# 3x10   Powerweb   BPW 3x10 BPW 2x30 BPW 2x30 BPW 2x30 BPW 2x30 BPW 2x30 BPW 2x30 BPW 2x30 Bpw 2x30   Powerweb  simulating opening  jar/wrist radial deviation GPW 3x10 Gpw 3x10           Scrub      1m 1m    Weblside rows/ext   Ther Activity            Peg  w. supination         Shelf lifts 5,7.5lbs 1x10   Pinch pins  Blue  3x10 Blue 3 sets Black 2 sets Blue 3 sets Blue 3 sets Blue Blue 3 sets Blue 3 sets     Work sim lift  B  11# 2x10 #11 3x10 15# kb  2x10 #11 3x10 #11 3x10 #11 3x10 #11 3x10 #11 3x10    UBE  5m 5m 6m 6m 6m 6m 6m 6m 5m   Web slide row    Black  3x10   Black 3x10      Modalities            MH R 8m 8m 5m 8m 8m 8m 8m 8m 8m   CP post Post tx 5m 5m 5m 5m 5m            20 21 out of 24 22 out of 24 23 out of 24 1 out of 30 2 /30 3/30 4/30 5/30   Manuals 12/18 12/23 12/27 12/30 1/8 1/13 1/15 1/20 1/22   TPR ext mass            Scar mob 2m 2m 2m 2m 2m 2m 2m 2m 2   retrograde 3m 3m 3m 3m 3m 3m 3m 3m 3m   Graston R 4m 4m 4m 4m 4m 4m 4m 4m 4m   Neuro Re-Ed            KT             Wrist A/AAROM, TGEs            Yoke splint for R rng sag band                                                             Ther Ex            Wrist A/AAROM 3# and 2#  3x10 #3 3x10 #3 3x10 #3 3x10 #3 3x10 #3 3x10 #3 3x10 #3 3x10 #4 3x10   P/S A/AAROM red  2x10 R 3x10 R 3x10 R 3x10 R 3x10 R 3x10 R 3x10 R 3x10 G 3x10   Foam roll stretch flex/ext            Finklestein stretch            Wrist circles 2.2# ball 2m 2.2# 2m 2.2# 2m 2.2# 2m 2.2# 2m 2.2# 2m 2.2# 2m 2.2# 2m 3.3# 3x10   Powerweb   BPW 3x10 BPW 2x30 BPW 2x30 BPW 2x30 BPW 2x30 BPW 2x30 BPW 2x30 BPW 2x30 Bpw 2x30   Powerweb  simulating opening jar/wrist radial deviation GPW 3x10            Scrub  1m 50% 1m 50% 1m 1m 1m    Weblside rows/ext   Ther Activity            Peg  w. supination         Shelf lifts 5,7.5lbs 1x10   Pinch pins  Blue  3x10 Blue 3 sets Blue 3 sets Blue 3 sets Blue 3 sets Blue Blue 3 sets Blue 3 sets     Work sim lift  B  11# 2x10 #11 3x10 #11 3x10 #11 3x10 #11 3x10 #11 3x10 #11 3x10 #11 3x10    UBE  5m 5m 5m 6m 6m 6m 6m 6m 5m               Modalities            MH R 8m  8m 8m 8m 8m 8m 8m 8m 8m   CP post Post tx 5m 5m 5m 5m 5m              13 14 15 16 17 18 19 20   Manuals 11/14 11/18 11/21 11/25 11/29 12/2 12/4 12/9 12/11 12/16   TPR ext mass 2m 2m    2m 2m 2m  2m   Scar mob 2m 2m 4m 4m 4m 3m 3m 2m 2m 2m   retrograde 3m 3m 4m 4m 4m 3m 3m 3m 3m 3m   Graston R 4m 4m 4m 4m 4m 4m 4m 4m 4m 4m   Neuro Re-Ed             KT       DW       Wrist A/AAROM, TGEs             Yoke splint for R rng sag band                                                                   Ther Ex             Wrist A/AAROM 3# and 2#  3x10 3#  3x10 3#  3x10 4#   3x10 #3 3x10 3#    3x10 3#  3x10 #3  3x10 #3  3x10 3#  3x10   P/S A/AAROM red  2x10 Red  3x10 Red  3x10 SM 3x10  SM  3x10 red  2x10 LG  3x10 LG  3x10 R  3x10   Foam roll stretch flex/ext        2x10     Finklestein stretch    x10 x10   x20      Wrist circles 2.2# ball 2m Marbles 2 m 2.2#   ball  2m Marbles 2m 2.2# 2m 2.2#  2m 2.2#  2m 2.2#  2m 2.2#  2m 2.2#  ball  2m    Powerweb   BPW 3x10 BPW  2x30 BPW  2x30 BPW 2x30 BPW 2x30 BPW 2x30 BPW 2x30 BlackPW 3x10 BPW  5x10 BPW  3x10   Powerweb  simulating opening jar/wrist radial deviation GPW 3x10        YPW  3x10 GPW  10x3  Wrist/forearm stabilization with little oscillations GPW 3x10    Scrub   1m  50%            Ther Activity             Peg  w. supination              Pinch pins  Blue  3x10 Blue 5 sets, varying grasps Black  1 set  Blue  2 sets  black  3sets Blue  3 sets G  3sets  G 3 sets, varying grasps Blue   5 sets Blue  3x10    Work sim lift  B  11# 2x10 11#  3x10  11#  3x10  #11 3x10 ball 11#  3x10  ball 11# ball  3x10 #11 ball 3x10 11#  2x10 11#  2x10   UBE  5m 5m   6m 6m 6m level 1 6m level 2.5 6m 5m                Modalities             MH R 8m 8m 8m 8m 8m 6m 6m 8m 6m 6m   CP post Post tx 5m 5m 5m 5m 5m 5m 5m

## 2025-03-03 ENCOUNTER — OFFICE VISIT (OUTPATIENT)
Dept: OCCUPATIONAL THERAPY | Facility: CLINIC | Age: 40
End: 2025-03-03
Payer: OTHER MISCELLANEOUS

## 2025-03-03 DIAGNOSIS — S69.81XD INJURY OF TRIANGULAR FIBROCARTILAGE COMPLEX (TFCC) OF RIGHT WRIST, SUBSEQUENT ENCOUNTER: Primary | ICD-10-CM

## 2025-03-03 PROCEDURE — 97110 THERAPEUTIC EXERCISES: CPT | Performed by: OCCUPATIONAL THERAPIST

## 2025-03-03 NOTE — PROGRESS NOTES
Daily Note     Today's date: 3/3/2025  Patient name: Juhi Tena  : 1985  MRN: [id number]  Referring provider: Josue Barker PA-C  Dx:   Encounter Diagnosis     ICD-10-CM    1. 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        Objective: See treatment diary below      Assessment: Tolerated treatment fair. Patient continues with soreness in the wrist/FA.  Proximal weakness and fatigue noted with heavy lifting.        Plan: Upgrade POC, work hardening     Precautions:   Sx 24  F/u with MD 24       CO-MORBIDITIES:  HEP ACCESS CODE:   FOTO Completed On:     POC Expires Reeval for Medicare to be completed Unit LImit Auth Expiration Date PT/OT/STVisit Limit     By visit 27  NA   na    Completed on visit               ows at top of treatment diary - top left boxes should look like the example below       TREATMENT DIARY  Auth Status                   Visit # 15   Visits Remaining 1        22 23 24 25  27    Manuals 1/31 2/10 2/12 2/19 2/21 2/24 2/26 3/3    TPR ext mass            Scar mob 2m 2m 2m 2m 2m       retrograde 3m 3m 2m 2m 2m       Graston R 4m 4m 4m 4m 4m       Neuro Re-Ed            KT             Wrist A/AAROM, TGEs            Yoke splint for R rng sag band                                                             Ther Ex            Wrist A/AAROM 3#   3x10 #3 3x10 #3 3x10 #3 3x10 #3 3x10 #4  3x10 #4 3x10 #3 3x10 #4 3x10   P/S A/AAROM red  2x10 R 3x10 R 3x10 R 3x10 R 3x10 G 3x10 G 3x10 G 3x10 G 3x10   Foam roll stretch flex/ext            Finklestein stretch            Wrist circles 2.2# ball 2m 2.2# 2m 3.3# 2m 2.2# 2m 2.2# 2m 2.2# 2m 2.2# 2m 2.2# 2m 3.3# 3x10   Powerweb   BPW 3x10 BPW 2x30 BPW 2x30 BPW 2x30 BPW 2x30 BPW 2x30 BPW 2x30 BPW 2x30 Bpw 2x30   Powerweb  simulating opening jar/wrist radial deviation GPW 3x10 Gpw 3x10           Scrub      1m 1m   Rows/ext  Weblside rows/ext   Ther Activity            Peg  w. supination        Shelf lifts  Shelf lifts 5,7.5lbs 1x10   Pinch pins  Blue  3x10 Blue 3 sets Black 2 sets Blue 3 sets Blue 3 sets Blue Blue 3 sets Blue 3 sets     Work sim lift  B  11# 2x10 #11 3x10 15# kb  2x10 #11 3x10 #11 3x10 #11 3x10 #11 3x10 #11 3x10    UBE  5m 5m 6m 6m 6m 6m 6m 6m 5m   Web slide row    Black  3x10   Black 3x10      Modalities            MH R 8m 8m 5m 8m 8m 8m 8m 8m 8m   CP post Post tx 5m 5m 5m 5m 5m            20 21 out of 24 22 out of 24 23 out of 24 1 out of 30 2 /30 3/30 4/30 5/30   Manuals 12/18 12/23 12/27 12/30 1/8 1/13 1/15 1/20 1/22   TPR ext mass            Scar mob 2m 2m 2m 2m 2m 2m 2m 2m 2   retrograde 3m 3m 3m 3m 3m 3m 3m 3m 3m   Graston R 4m 4m 4m 4m 4m 4m 4m 4m 4m   Neuro Re-Ed            KT             Wrist A/AAROM, TGEs            Yoke splint for R rng sag band                                                             Ther Ex            Wrist A/AAROM 3# and 2#  3x10 #3 3x10 #3 3x10 #3 3x10 #3 3x10 #3 3x10 #3 3x10 #3 3x10 #4 3x10   P/S A/AAROM red  2x10 R 3x10 R 3x10 R 3x10 R 3x10 R 3x10 R 3x10 R 3x10 G 3x10   Foam roll stretch flex/ext            Finklestein stretch            Wrist circles 2.2# ball 2m 2.2# 2m 2.2# 2m 2.2# 2m 2.2# 2m 2.2# 2m 2.2# 2m 2.2# 2m 3.3# 3x10   Powerweb   BPW 3x10 BPW 2x30 BPW 2x30 BPW 2x30 BPW 2x30 BPW 2x30 BPW 2x30 BPW 2x30 Bpw 2x30   Powerweb  simulating opening jar/wrist radial deviation GPW 3x10            Scrub  1m 50% 1m 50% 1m 1m 1m    Weblside rows/ext   Ther Activity            Peg  w. supination         Shelf lifts 5,7.5lbs 1x10   Pinch pins  Blue  3x10 Blue 3 sets Blue 3 sets Blue 3 sets Blue 3 sets Blue Blue 3 sets Blue 3 sets     Work sim lift  B  11# 2x10 #11 3x10 #11 3x10 #11 3x10 #11 3x10 #11 3x10 #11 3x10 #11 3x10    UBE  5m 5m 5m 6m 6m 6m 6m 6m 5m               Modalities            MH R 8m 8m 8m 8m 8m 8m 8m 8m 8m   CP post Post tx 5m 5m 5m 5m 5m               13 14 15 16 17 18 19 20   Manuals 11/14 11/18 11/21 11/25 11/29 12/2 12/4 12/9 12/11 12/16   TPR ext mass 2m 2m    2m 2m 2m  2m   Scar mob 2m 2m 4m 4m 4m 3m 3m 2m 2m 2m   retrograde 3m 3m 4m 4m 4m 3m 3m 3m 3m 3m   Graston R 4m 4m 4m 4m 4m 4m 4m 4m 4m 4m   Neuro Re-Ed             KT       DW       Wrist A/AAROM, TGEs             Yoke splint for R rng sag band                                                                   Ther Ex             Wrist A/AAROM 3# and 2#  3x10 3#  3x10 3#  3x10 4#   3x10 #3 3x10 3#    3x10 3#  3x10 #3  3x10 #3  3x10 3#  3x10   P/S A/AAROM red  2x10 Red  3x10 Red  3x10 SM 3x10  SM  3x10 red  2x10 LG  3x10 LG  3x10 R  3x10   Foam roll stretch flex/ext        2x10     Finklestein stretch    x10 x10   x20      Wrist circles 2.2# ball 2m Marbles 2 m 2.2#   ball  2m Marbles 2m 2.2# 2m 2.2#  2m 2.2#  2m 2.2#  2m 2.2#  2m 2.2#  ball  2m    Powerweb   BPW 3x10 BPW  2x30 BPW  2x30 BPW 2x30 BPW 2x30 BPW 2x30 BPW 2x30 BlackPW 3x10 BPW  5x10 BPW  3x10   Powerweb  simulating opening jar/wrist radial deviation GPW 3x10        YPW  3x10 GPW  10x3  Wrist/forearm stabilization with little oscillations GPW 3x10    Scrub   1m  50%            Ther Activity             Peg  w. supination              Pinch pins  Blue  3x10 Blue 5 sets, varying grasps Black  1 set  Blue  2 sets  black  3sets Blue  3 sets G  3sets  G 3 sets, varying grasps Blue   5 sets Blue  3x10    Work sim lift  B  11# 2x10 11#  3x10  11#  3x10  #11 3x10 ball 11#  3x10  ball 11# ball  3x10 #11 ball 3x10 11#  2x10 11#  2x10   UBE  5m 5m   6m 6m 6m level 1 6m level 2.5 6m 5m                Modalities             MH R 8m 8m 8m 8m 8m 6m 6m 8m 6m 6m   CP post Post tx 5m 5m 5m 5m 5m 5m 5m

## 2025-03-05 ENCOUNTER — OFFICE VISIT (OUTPATIENT)
Dept: OCCUPATIONAL THERAPY | Facility: CLINIC | Age: 40
End: 2025-03-05
Payer: OTHER MISCELLANEOUS

## 2025-03-05 DIAGNOSIS — S69.81XD INJURY OF TRIANGULAR FIBROCARTILAGE COMPLEX (TFCC) OF RIGHT WRIST, SUBSEQUENT ENCOUNTER: Primary | ICD-10-CM

## 2025-03-05 PROCEDURE — 97110 THERAPEUTIC EXERCISES: CPT | Performed by: OCCUPATIONAL THERAPIST

## 2025-03-05 NOTE — PROGRESS NOTES
Daily Note     Today's date: 3/5/2025  Patient name: Juhi Tena  : 1985  MRN: [id number]  Referring provider: Josue Barker PA-C  Dx:   Encounter Diagnosis     ICD-10-CM    1. 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      Objective: See treatment diary below      Assessment: Tolerated treatment fair. Patient continues to work of deconditioning and RUE strengthening for RTW.      Plan: Upgrade POC, work hardening     Precautions:   Sx 24  F/u with MD 24       CO-MORBIDITIES:  HEP ACCESS CODE:   FOTO Completed On:     POC Expires Reeval for Medicare to be completed Unit LImit Auth Expiration Date PT/OT/STVisit Limit     By visit 27  NA   na    Completed on visit               ows at top of treatment diary - top left boxes should look like the example below       TREATMENT DIARY  Auth Status                   Visit # 15   Visits Remaining 1       20 21 22 23 24 25  27    Manuals 1/31 2/10 2/12 2/19 2/21 2/24 2/26 3/3    TPR ext mass            Scar mob 2m 2m 2m 2m 2m       retrograde 3m 3m 2m 2m 2m       Graston R 4m 4m 4m 4m 4m       Neuro Re-Ed            KT             Wrist A/AAROM, TGEs            Yoke splint for R rng sag band                                                             Ther Ex            Wrist A/AAROM 3#   3x10 #3 3x10 #3 3x10 #3 3x10 #3 3x10 #4  3x10 #4 3x10 #4  3x10 #4 3x10   P/S A/AAROM red  2x10 R 3x10 R 3x10 R 3x10 R 3x10 G 3x10 G 3x10 G 3x10 G 3x10   Foam roll stretch flex/ext            Finklestein stretch            Wrist circles 2.2# ball 2m 2.2# 2m 3.3# 2m 2.2# 2m 2.2# 2m 2.2# 2m 2.2# 2m 2.2# 2m 3.3# 3x10   Powerweb   BPW 3x10 BPW 2x30 BPW 2x30 BPW 2x30 BPW 2x30 BPW 2x30 BPW 2x30 BPW 2x30 Bpw 2x30   Powerweb  simulating opening jar/wrist radial deviation GPW 3x10 Gpw 3x10           Scrub      1m 1m   Rows/ext Weblside rows/ext   Ther  Activity            Peg  w. supination        Shelf lifts  Shelf lifts 5,7.5lbs 1x10   Pinch pins  Blue  3x10 Blue 3 sets Black 2 sets Blue 3 sets Blue 3 sets Blue Blue 3 sets Blue 3 sets     Work sim lift  B  11# 2x10 #11 3x10 15# kb  2x10 #11 3x10 #11 3x10 #11 3x10 #11 3x10 #11 3x10    UBE  5m 5m 6m 6m 6m 6m 6m 6m 5m   Web slide row    Black  3x10   Black 3x10      Modalities            MH R 8m 8m 5m 8m 8m 8m 8m 8m 8m   CP post Post tx 5m 5m 5m 5m 5m            20 21 out of 24 22 out of 24 23 out of 24 1 out of 30 2 /30 3/30 4/30 5/30   Manuals 12/18 12/23 12/27 12/30 1/8 1/13 1/15 1/20 1/22   TPR ext mass            Scar mob 2m 2m 2m 2m 2m 2m 2m 2m 2   retrograde 3m 3m 3m 3m 3m 3m 3m 3m 3m   Graston R 4m 4m 4m 4m 4m 4m 4m 4m 4m   Neuro Re-Ed            KT             Wrist A/AAROM, TGEs            Yoke splint for R rng sag band                                                             Ther Ex            Wrist A/AAROM 3# and 2#  3x10 #3 3x10 #3 3x10 #3 3x10 #3 3x10 #3 3x10 #3 3x10 #3 3x10 #4 3x10   P/S A/AAROM red  2x10 R 3x10 R 3x10 R 3x10 R 3x10 R 3x10 R 3x10 R 3x10 G 3x10   Foam roll stretch flex/ext            Finklestein stretch            Wrist circles 2.2# ball 2m 2.2# 2m 2.2# 2m 2.2# 2m 2.2# 2m 2.2# 2m 2.2# 2m 2.2# 2m 3.3# 3x10   Powerweb   BPW 3x10 BPW 2x30 BPW 2x30 BPW 2x30 BPW 2x30 BPW 2x30 BPW 2x30 BPW 2x30 Bpw 2x30   Powerweb  simulating opening jar/wrist radial deviation GPW 3x10            Scrub  1m 50% 1m 50% 1m 1m 1m    Weblside rows/ext   Ther Activity            Peg  w. supination         Shelf lifts 5,7.5lbs 1x10   Pinch pins  Blue  3x10 Blue 3 sets Blue 3 sets Blue 3 sets Blue 3 sets Blue Blue 3 sets Blue 3 sets     Work sim lift  B  11# 2x10 #11 3x10 #11 3x10 #11 3x10 #11 3x10 #11 3x10 #11 3x10 #11 3x10    UBE  5m 5m 5m 6m 6m 6m 6m 6m 5m               Modalities            MH R 8m 8m 8m 8m 8m 8m 8m 8m 8m   CP post Post tx 5m 5m 5m 5m 5m              13 14 15 16 17 18  19 20   Manuals 11/14 11/18 11/21 11/25 11/29 12/2 12/4 12/9 12/11 12/16   TPR ext mass 2m 2m    2m 2m 2m  2m   Scar mob 2m 2m 4m 4m 4m 3m 3m 2m 2m 2m   retrograde 3m 3m 4m 4m 4m 3m 3m 3m 3m 3m   Graston R 4m 4m 4m 4m 4m 4m 4m 4m 4m 4m   Neuro Re-Ed             KT       DW       Wrist A/AAROM, TGEs             Yoke splint for R rng sag band                                                                   Ther Ex             Wrist A/AAROM 3# and 2#  3x10 3#  3x10 3#  3x10 4#   3x10 #3 3x10 3#    3x10 3#  3x10 #3  3x10 #3  3x10 3#  3x10   P/S A/AAROM red  2x10 Red  3x10 Red  3x10 SM 3x10  SM  3x10 red  2x10 LG  3x10 LG  3x10 R  3x10   Foam roll stretch flex/ext        2x10     [place] stretch    x10 x10   x20      Wrist circles 2.2# ball 2m Marbles 2 m 2.2#   ball  2m Marbles 2m 2.2# 2m 2.2#  2m 2.2#  2m 2.2#  2m 2.2#  2m 2.2#  ball  2m    Powerweb   BPW 3x10 BPW  2x30 BPW  2x30 BPW 2x30 BPW 2x30 BPW 2x30 BPW 2x30 BlackPW 3x10 BPW  5x10 BPW  3x10   Powerweb  simulating opening jar/wrist radial deviation GPW 3x10        YPW  3x10 GPW  10x3  Wrist/forearm stabilization with little oscillations GPW 3x10    Scrub   1m  50%            Ther Activity             Peg  w. supination              Pinch pins  Blue  3x10 Blue 5 sets, varying grasps Black  1 set  Blue  2 sets  black  3sets Blue  3 sets G  3sets  G 3 sets, varying grasps Blue   5 sets Blue  3x10    Work sim lift  B  11# 2x10 11#  3x10  11#  3x10  #11 3x10 ball 11#  3x10  ball 11# ball  3x10 #11 ball 3x10 11#  2x10 11#  2x10   UBE  5m 5m   6m 6m 6m level 1 6m level 2.5 6m 5m                Modalities             MH R 8m 8m 8m 8m 8m 6m 6m 8m 6m 6m   CP post Post tx 5m 5m 5m 5m 5m 5m 5m

## 2025-03-10 ENCOUNTER — APPOINTMENT (OUTPATIENT)
Dept: OCCUPATIONAL THERAPY | Facility: CLINIC | Age: 40
End: 2025-03-10
Payer: OTHER MISCELLANEOUS

## 2025-03-12 ENCOUNTER — OFFICE VISIT (OUTPATIENT)
Dept: OCCUPATIONAL THERAPY | Facility: CLINIC | Age: 40
End: 2025-03-12
Payer: OTHER MISCELLANEOUS

## 2025-03-12 DIAGNOSIS — S69.81XD INJURY OF TRIANGULAR FIBROCARTILAGE COMPLEX (TFCC) OF RIGHT WRIST, SUBSEQUENT ENCOUNTER: Primary | ICD-10-CM

## 2025-03-12 PROCEDURE — 97110 THERAPEUTIC EXERCISES: CPT | Performed by: OCCUPATIONAL THERAPIST

## 2025-03-12 NOTE — PROGRESS NOTES
Daily Note     Today's date: 3/12/2025  Patient name: Juhi Tena  : 1985  MRN: [id number]  Referring provider: Josue Barker PA-C  Dx:   Encounter Diagnosis     ICD-10-CM    1. 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       Objective: See treatment diary below      Assessment: Tolerated treatment fair. Patient has purchased wrist widget to wear with activities due to ongoing pain in the wrist.  She has some pain relief with Wbing text in clinic.    Plan: Upgrade POC, work hardening     Precautions:   Sx 24  F/u with MD 24       CO-MORBIDITIES:  HEP ACCESS CODE:   FOTO Completed On:     POC Expires Reeval for Medicare to be completed Unit LImit Auth Expiration Date PT/OT/STVisit Limit     By visit 27  NA   na    Completed on visit               ows at top of treatment diary - top left boxes should look like the example below       TREATMENT DIARY  Auth Status                   Visit # 15   Visits Remaining 1       20 21 22 23 24 25 26 27 28   Manuals 1/31 2/10 2/12 2/19 2/21 2/24 2/26 3/3 3/12   TPR ext mass            Scar mob 2m 2m 2m 2m 2m       retrograde 3m 3m 2m 2m 2m       Graston R 4m 4m 4m 4m 4m       Neuro Re-Ed            KT             Wrist A/AAROM, TGEs            Yoke splint for R rng sag band                                                             Ther Ex            Wrist A/AAROM 3#   3x10 #3 3x10 #3 3x10 #3 3x10 #3 3x10 #4  3x10 #4 3x10 #4  3x10 #4 3x10   P/S A/AAROM red  2x10 R 3x10 R 3x10 R 3x10 R 3x10 G 3x10 G 3x10 G 3x10 G 3x10   Foam roll stretch flex/ext            Finklestein stretch            Wrist circles 2.2# ball 2m 2.2# 2m 3.3# 2m 2.2# 2m 2.2# 2m 2.2# 2m 2.2# 2m 2.2# 2m 3.3# 3x10   Powerweb   BPW 3x10 BPW 2x30 BPW 2x30 BPW 2x30 BPW 2x30 BPW 2x30 BPW 2x30 BPW 2x30 Bpw 2x30   Powerweb  simulating opening jar/wrist radial  deviation GPW 3x10 Gpw 3x10           Scrub      1m 1m   Rows/ext Weblside rows/ext   Ther Activity            Peg  w. supination        Shelf lifts  Shelf lifts 5,7.5lbs 1x10   Pinch pins  Blue  3x10 Blue 3 sets Black 2 sets Blue 3 sets Blue 3 sets Blue Blue 3 sets Blue 3 sets Blue 3 sets    Work sim lift  B  11# 2x10 #11 3x10 15# kb  2x10 #11 3x10 #11 3x10 #11 3x10 #11 3x10 #11 3x10 #11 3x10   UBE  5m 5m 6m 6m 6m 6m 6m 6m 5m   Web slide row    Black  3x10   Black 3x10      Modalities            MH R 8m 8m 5m 8m 8m 8m 8m 8m 8m   CP post Post tx 5m 5m 5m 5m 5m            20 21 out of 24 22 out of 24 23 out of 24 1 out of 30 2 /30 3/30 4/30 5/30   Manuals 12/18 12/23 12/27 12/30 1/8 1/13 1/15 1/20 1/22   TPR ext mass            Scar mob 2m 2m 2m 2m 2m 2m 2m 2m 2   retrograde 3m 3m 3m 3m 3m 3m 3m 3m 3m   Graston R 4m 4m 4m 4m 4m 4m 4m 4m 4m   Neuro Re-Ed            KT             Wrist A/AAROM, TGEs            Yoke splint for R rng sag band                                                             Ther Ex            Wrist A/AAROM 3# and 2#  3x10 #3 3x10 #3 3x10 #3 3x10 #3 3x10 #3 3x10 #3 3x10 #3 3x10 #4 3x10   P/S A/AAROM red  2x10 R 3x10 R 3x10 R 3x10 R 3x10 R 3x10 R 3x10 R 3x10 G 3x10   Foam roll stretch flex/ext            Finklestein stretch            Wrist circles 2.2# ball 2m 2.2# 2m 2.2# 2m 2.2# 2m 2.2# 2m 2.2# 2m 2.2# 2m 2.2# 2m 3.3# 3x10   Powerweb   BPW 3x10 BPW 2x30 BPW 2x30 BPW 2x30 BPW 2x30 BPW 2x30 BPW 2x30 BPW 2x30 Bpw 2x30   Powerweb  simulating opening jar/wrist radial deviation GPW 3x10            Scrub  1m 50% 1m 50% 1m 1m 1m    Weblside rows/ext   Ther Activity            Peg  w. supination         Shelf lifts 5,7.5lbs 1x10   Pinch pins  Blue  3x10 Blue 3 sets Blue 3 sets Blue 3 sets Blue 3 sets Blue Blue 3 sets Blue 3 sets     Work sim lift  B  11# 2x10 #11 3x10 #11 3x10 #11 3x10 #11 3x10 #11 3x10 #11 3x10 #11 3x10    UBE  5m 5m 5m 6m 6m 6m 6m 6m 5m                Modalities            MH R 8m 8m 8m 8m 8m 8m 8m 8m 8m   CP post Post tx 5m 5m 5m 5m 5m              13 14 15 16 17 18 19 20   Manuals 11/14 11/18 11/21 11/25 11/29 12/2 12/4 12/9 12/11 12/16   TPR ext mass 2m 2m    2m 2m 2m  2m   Scar mob 2m 2m 4m 4m 4m 3m 3m 2m 2m 2m   retrograde 3m 3m 4m 4m 4m 3m 3m 3m 3m 3m   Graston R 4m 4m 4m 4m 4m 4m 4m 4m 4m 4m   Neuro Re-Ed             KT       DW       Wrist A/AAROM, TGEs             Yoke splint for R rng sag band                                                                   Ther Ex             Wrist A/AAROM 3# and 2#  3x10 3#  3x10 3#  3x10 4#   3x10 #3 3x10 3#    3x10 3#  3x10 #3  3x10 #3  3x10 3#  3x10   P/S A/AAROM red  2x10 Red  3x10 Red  3x10 SM 3x10  SM  3x10 red  2x10 LG  3x10 LG  3x10 R  3x10   Foam roll stretch flex/ext        2x10     Finklestein stretch    x10 x10   x20      Wrist circles 2.2# ball 2m Marbles 2 m 2.2#   ball  2m Marbles 2m 2.2# 2m 2.2#  2m 2.2#  2m 2.2#  2m 2.2#  2m 2.2#  ball  2m    Powerweb   BPW 3x10 BPW  2x30 BPW  2x30 BPW 2x30 BPW 2x30 BPW 2x30 BPW 2x30 BlackPW 3x10 BPW  5x10 BPW  3x10   Powerweb  simulating opening jar/wrist radial deviation GPW 3x10        YPW  3x10 GPW  10x3  Wrist/forearm stabilization with little oscillations GPW 3x10    Scrub   1m  50%            Ther Activity             Peg  w. supination              Pinch pins  Blue  3x10 Blue 5 sets, varying grasps Black  1 set  Blue  2 sets  black  3sets Blue  3 sets G  3sets  G 3 sets, varying grasps Blue   5 sets Blue  3x10    Work sim lift  B  11# 2x10 11#  3x10  11#  3x10  #11 3x10 ball 11#  3x10  ball 11# ball  3x10 #11 ball 3x10 11#  2x10 11#  2x10   UBE  5m 5m   6m 6m 6m level 1 6m level 2.5 6m 5m                Modalities             MH R 8m 8m 8m 8m 8m 6m 6m 8m 6m 6m   CP post Post tx 5m 5m 5m 5m 5m 5m 5m

## 2025-03-17 ENCOUNTER — OFFICE VISIT (OUTPATIENT)
Dept: OCCUPATIONAL THERAPY | Facility: CLINIC | Age: 40
End: 2025-03-17
Payer: OTHER MISCELLANEOUS

## 2025-03-17 DIAGNOSIS — S69.81XD INJURY OF TRIANGULAR FIBROCARTILAGE COMPLEX (TFCC) OF RIGHT WRIST, SUBSEQUENT ENCOUNTER: Primary | ICD-10-CM

## 2025-03-17 PROCEDURE — 97110 THERAPEUTIC EXERCISES: CPT | Performed by: OCCUPATIONAL THERAPIST

## 2025-03-17 NOTE — PROGRESS NOTES
Daily Note     Today's date: 3/17/2025  Patient name: Juhi Tena  : 1985  MRN: [id number]  Referring provider: Josue Barker PA-C  Dx:   Encounter Diagnosis     ICD-10-CM    1. 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      Objective: See treatment diary below      Assessment: Tolerated treatment fair. Patient wearing wrist widget for support.  Pain is less with lifting.     Precautions:   Sx 24  F/u with MD 24       CO-MORBIDITIES:  HEP ACCESS CODE:   FOTO Completed On:     POC Expires Reeval for Medicare to be completed Unit LImit Auth Expiration Date PT/OT/STVisit Limit     By visit 27  NA   na    Completed on visit               ows at top of treatment diary - top left boxes should look like the example below       TREATMENT DIARY  Auth Status                   Visit # 15   Visits Remaining 1       20 21 22 23 24 25 26 27 28 29   Manuals 1/31 2/10 2/12 2/19 2/21 2/24 2/26 3/3 3/12 3/17   TPR ext mass             Scar mob 2m 2m 2m 2m 2m        retrograde 3m 3m 2m 2m 2m        Graston R 4m 4m 4m 4m 4m        Neuro Re-Ed             KT              Wrist A/AAROM, TGEs             Yoke splint for R rng sag band                                                                  Ther Ex             Wrist A/AAROM 3#   3x10 #3 3x10 #3 3x10 #3 3x10 #3 3x10 #4  3x10 #4 3x10 #4  3x10 #4 3x10 #4 3x10   P/S A/AAROM red  2x10 R 3x10 R 3x10 R 3x10 R 3x10 G 3x10 G 3x10 G 3x10 G 3x10 G 3x10   Foam roll stretch flex/ext             Finklestein stretch             Wrist circles 2.2# ball 2m 2.2# 2m 3.3# 2m 2.2# 2m 2.2# 2m 2.2# 2m 2.2# 2m 2.2# 2m 3.3# 3x10 3.3# 3x10   Powerweb   BPW 3x10 BPW 2x30 BPW 2x30 BPW 2x30 BPW 2x30 BPW 2x30 BPW 2x30 BPW 2x30 Bpw 2x30 BPW 2x30   Powerweb  simulating opening jar/wrist radial deviation GPW 3x10 Gpw  3x10            Scrub      1m 1m   Rows/ext Weblside rows/ext Weblside rows/ext   Ther Activity             Peg  w. supination        Shelf lifts  Shelf lifts 5,7.5lbs 1x10 Shelf lifts 5.7lbs   Pinch pins  Blue  3x10 Blue 3 sets Black 2 sets Blue 3 sets Blue 3 sets Blue Blue 3 sets Blue 3 sets Blue 3 sets Blue 3 sets    Work sim lift  B  11# 2x10 #11 3x10 15# kb  2x10 #11 3x10 #11 3x10 #11 3x10 #11 3x10 #11 3x10 #11 3x10 #11 3x10   UBE  5m 5m 6m 6m 6m 6m 6m 6m 5m 5m   Web slide row    Black  3x10   Black 3x10       Modalities             MH R 8m 8m 5m 8m 8m 8m 8m 8m 8m 8m   CP post Post tx 5m 5m 5m 5m 5m             20 21 out of 24 22 out of 24 23 out of 24 1 out of 30 2 /30 3/30 4/30 5/30   Manuals 12/18 12/23 12/27 12/30 1/8 1/13 1/15 1/20 1/22   TPR ext mass            Scar mob 2m 2m 2m 2m 2m 2m 2m 2m 2   retrograde 3m 3m 3m 3m 3m 3m 3m 3m 3m   Graston R 4m 4m 4m 4m 4m 4m 4m 4m 4m   Neuro Re-Ed            KT             Wrist A/AAROM, TGEs            Yoke splint for R rng sag band                                                             Ther Ex            Wrist A/AAROM 3# and 2#  3x10 #3 3x10 #3 3x10 #3 3x10 #3 3x10 #3 3x10 #3 3x10 #3 3x10 #4 3x10   P/S A/AAROM red  2x10 R 3x10 R 3x10 R 3x10 R 3x10 R 3x10 R 3x10 R 3x10 G 3x10   Foam roll stretch flex/ext            Finklestein stretch            Wrist circles 2.2# ball 2m 2.2# 2m 2.2# 2m 2.2# 2m 2.2# 2m 2.2# 2m 2.2# 2m 2.2# 2m 3.3# 3x10   Powerweb   BPW 3x10 BPW 2x30 BPW 2x30 BPW 2x30 BPW 2x30 BPW 2x30 BPW 2x30 BPW 2x30 Bpw 2x30   Powerweb  simulating opening jar/wrist radial deviation GPW 3x10            Scrub  1m 50% 1m 50% 1m 1m 1m    Weblside rows/ext   Ther Activity            Peg  w. supination         Shelf lifts 5,7.5lbs 1x10   Pinch pins  Blue  3x10 Blue 3 sets Blue 3 sets Blue 3 sets Blue 3 sets Blue Blue 3 sets Blue 3 sets     Work sim lift  B  11# 2x10 #11 3x10 #11 3x10 #11 3x10 #11 3x10 #11 3x10 #11 3x10 #11 3x10    UBE  5m  5m 5m 6m 6m 6m 6m 6m 5m               Modalities            MH R 8m 8m 8m 8m 8m 8m 8m 8m 8m   CP post Post tx 5m 5m 5m 5m 5m              13 14 15 16 17 18 19 20   Manuals 11/14 11/18 11/21 11/25 11/29 12/2 12/4 12/9 12/11 12/16   TPR ext mass 2m 2m    2m 2m 2m  2m   Scar mob 2m 2m 4m 4m 4m 3m 3m 2m 2m 2m   retrograde 3m 3m 4m 4m 4m 3m 3m 3m 3m 3m   Graston R 4m 4m 4m 4m 4m 4m 4m 4m 4m 4m   Neuro Re-Ed             KT       DW       Wrist A/AAROM, TGEs             Yoke splint for R rng sag band                                                                   Ther Ex             Wrist A/AAROM 3# and 2#  3x10 3#  3x10 3#  3x10 4#   3x10 #3 3x10 3#    3x10 3#  3x10 #3  3x10 #3  3x10 3#  3x10   P/S A/AAROM red  2x10 Red  3x10 Red  3x10 SM 3x10  SM  3x10 red  2x10 LG  3x10 LG  3x10 R  3x10   Foam roll stretch flex/ext        2x10     Finklestein stretch    x10 x10   x20      Wrist circles 2.2# ball 2m Marbles 2 m 2.2#   ball  2m Marbles 2m 2.2# 2m 2.2#  2m 2.2#  2m 2.2#  2m 2.2#  2m 2.2#  ball  2m    Powerweb   BPW 3x10 BPW  2x30 BPW  2x30 BPW 2x30 BPW 2x30 BPW 2x30 BPW 2x30 BlackPW 3x10 BPW  5x10 BPW  3x10   Powerweb  simulating opening jar/wrist radial deviation GPW 3x10        YPW  3x10 GPW  10x3  Wrist/forearm stabilization with little oscillations GPW 3x10    Scrub   1m  50%            Ther Activity             Peg  w. supination              Pinch pins  Blue  3x10 Blue 5 sets, varying grasps Black  1 set  Blue  2 sets  black  3sets Blue  3 sets G  3sets  G 3 sets, varying grasps Blue   5 sets Blue  3x10    Work sim lift  B  11# 2x10 11#  3x10  11#  3x10  #11 3x10 ball 11#  3x10  ball 11# ball  3x10 #11 ball 3x10 11#  2x10 11#  2x10   UBE  5m 5m   6m 6m 6m level 1 6m level 2.5 6m 5m                Modalities             MH R 8m 8m 8m 8m 8m 6m 6m 8m 6m 6m   CP post Post tx 5m 5m 5m 5m 5m 5m 5m

## 2025-03-19 ENCOUNTER — OFFICE VISIT (OUTPATIENT)
Dept: OCCUPATIONAL THERAPY | Facility: CLINIC | Age: 40
End: 2025-03-19
Payer: OTHER MISCELLANEOUS

## 2025-03-19 DIAGNOSIS — S69.81XD INJURY OF TRIANGULAR FIBROCARTILAGE COMPLEX (TFCC) OF RIGHT WRIST, SUBSEQUENT ENCOUNTER: Primary | ICD-10-CM

## 2025-03-19 PROCEDURE — 97110 THERAPEUTIC EXERCISES: CPT | Performed by: OCCUPATIONAL THERAPIST

## 2025-03-19 NOTE — PROGRESS NOTES
Daily Note     Today's date: 3/19/2025  Patient name: Juhi Tena  : 1985  MRN: [id number]  Referring provider: Josue Barker PA-C  Dx:   Encounter Diagnosis     ICD-10-CM    1. 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      Objective: See treatment diary below      Assessment: Pt. Has achieved full AROM of the wrist.  She continues with pain over the ulnar incision, palpable suture knot irritation of 8/10.  Her overall pain is less with constant 3/10 with daily activities, discomfort in the 1st dorsal compartment.  She has increased her ability to lift/carry with weighted objects to > 15lbs.  Treatment has focused on lifting and conditioning due to being out of work for an extended period of time.  FOTO score has improved since last ReEvaluation.  Pt. Has purchased a wrist widget for circumfirential support of the wrist with activities which provides pain relief with lifting as noted in clinic while wearing the support brace.  Pt. To return to MD on Monday for F/U, anticipate RTW date.        Precautions:   Sx 24  F/u with MD 24       CO-MORBIDITIES:  HEP ACCESS CODE:   FOTO Completed On:     POC Expires Reeval for Medicare to be completed Unit LImit Auth Expiration Date PT/OT/STVisit Limit     By visit 27  NA   na    Completed on visit               ows at top of treatment diary - top left boxes should look like the example below       TREATMENT DIARY  Auth Status                   Visit # 15   Visits Remaining 1       20 21 22 23 24 25 26 27 28 29 30   Manuals 1/31 2/10 2/12 2/19 2/21 2/24 2/26 3/3 3/12 3/17 3/19   TPR ext mass              Scar mob 2m 2m 2m 2m 2m         retrograde 3m 3m 2m 2m 2m         Graston R 4m 4m 4m 4m 4m         Neuro Re-Ed              KT               Wrist A/Aline PARR splint for  R rng sag band                                                                       Ther Ex              Wrist A/AAROM 3#   3x10 #3 3x10 #3 3x10 #3 3x10 #3 3x10 #4  3x10 #4 3x10 #4  3x10 #4 3x10 #4 3x10 #4 3x10   P/S A/AAROM red  2x10 R 3x10 R 3x10 R 3x10 R 3x10 G 3x10 G 3x10 G 3x10 G 3x10 G 3x10 G 3x10   Foam roll stretch flex/ext              Finklestein stretch              Wrist circles 2.2# ball 2m 2.2# 2m 3.3# 2m 2.2# 2m 2.2# 2m 2.2# 2m 2.2# 2m 2.2# 2m 3.3# 3x10 3.3# 3x10 4.4# 3x10   Powerweb   BPW 3x10 BPW 2x30 BPW 2x30 BPW 2x30 BPW 2x30 BPW 2x30 BPW 2x30 BPW 2x30 Bpw 2x30 BPW 2x30 BPW 2x30   Powerweb  simulating opening jar/wrist radial deviation GPW 3x10 Gpw 3x10             Scrub      1m 1m   Rows/ext Weblside rows/ext Weblside rows/ext Webslde rows/ext. Black   Ther Activity              Peg  w. supination        Shelf lifts  Shelf lifts 5,7.5lbs 1x10 Shelf lifts 5.7lbs Shelf lifts    Pinch pins  Blue  3x10 Blue 3 sets Black 2 sets Blue 3 sets Blue 3 sets Blue Blue 3 sets Blue 3 sets Blue 3 sets Blue 3 sets Black 3 sets    Work sim lift  B  11# 2x10 #11 3x10 15# kb  2x10 #11 3x10 #11 3x10 #11 3x10 #11 3x10 #11 3x10 #11 3x10 #11 3x10 #11 3x10   UBE  5m 5m 6m 6m 6m 6m 6m 6m 5m 5m 5m   Web slide row    Black  3x10   Black 3x10        Modalities              MH R 8m 8m 5m 8m 8m 8m 8m 8m 8m 8m 8m   CP post Post tx 5m 5m 5m 5m 5m              20 21 out of 24 22 out of 24 23 out of 24 1 out of 30 2 /30 3/30 4/30 5/30   Manuals 12/18 12/23 12/27 12/30 1/8 1/13 1/15 1/20 1/22   TPR ext mass            Scar mob 2m 2m 2m 2m 2m 2m 2m 2m 2   retrograde 3m 3m 3m 3m 3m 3m 3m 3m 3m   Graston R 4m 4m 4m 4m 4m 4m 4m 4m 4m   Neuro Re-Ed            KT             Wrist A/AAROM, TGEs            Yoke splint for R rng sag band                                                             Ther Ex            Wrist A/AAROM 3# and 2#  3x10 #3 3x10 #3 3x10 #3 3x10 #3 3x10 #3 3x10 #3 3x10 #3 3x10 #4 3x10   P/S A/AAROM  red  2x10 R 3x10 R 3x10 R 3x10 R 3x10 R 3x10 R 3x10 R 3x10 G 3x10   Foam roll stretch flex/ext            Finklestein stretch            Wrist circles 2.2# ball 2m 2.2# 2m 2.2# 2m 2.2# 2m 2.2# 2m 2.2# 2m 2.2# 2m 2.2# 2m 3.3# 3x10   Powerweb   BPW 3x10 BPW 2x30 BPW 2x30 BPW 2x30 BPW 2x30 BPW 2x30 BPW 2x30 BPW 2x30 Bpw 2x30   Powerweb  simulating opening jar/wrist radial deviation GPW 3x10            Scrub  1m 50% 1m 50% 1m 1m 1m    Weblside rows/ext   Ther Activity            Peg  w. supination         Shelf lifts 5,7.5lbs 1x10   Pinch pins  Blue  3x10 Blue 3 sets Blue 3 sets Blue 3 sets Blue 3 sets Blue Blue 3 sets Blue 3 sets     Work sim lift  B  11# 2x10 #11 3x10 #11 3x10 #11 3x10 #11 3x10 #11 3x10 #11 3x10 #11 3x10    UBE  5m 5m 5m 6m 6m 6m 6m 6m 5m               Modalities            MH R 8m 8m 8m 8m 8m 8m 8m 8m 8m   CP post Post tx 5m 5m 5m 5m 5m              13 14 15 16 17 18 19 20   Manuals 11/14 11/18 11/21 11/25 11/29 12/2 12/4 12/9 12/11 12/16   TPR ext mass 2m 2m    2m 2m 2m  2m   Scar mob 2m 2m 4m 4m 4m 3m 3m 2m 2m 2m   retrograde 3m 3m 4m 4m 4m 3m 3m 3m 3m 3m   Graston R 4m 4m 4m 4m 4m 4m 4m 4m 4m 4m   Neuro Re-Ed             KT       DW       Wrist A/AAROM, TGEs             Yoke splint for R rng sag band                                                                   Ther Ex             Wrist A/AAROM 3# and 2#  3x10 3#  3x10 3#  3x10 4#   3x10 #3 3x10 3#    3x10 3#  3x10 #3  3x10 #3  3x10 3#  3x10   P/S A/AAROM red  2x10 Red  3x10 Red  3x10 SM 3x10  SM  3x10 red  2x10 LG  3x10 LG  3x10 R  3x10   Foam roll stretch flex/ext        2x10     Finklestein stretch    x10 x10   x20      Wrist circles 2.2# ball 2m Marbles 2 m 2.2#   ball  2m Marbles 2m 2.2# 2m 2.2#  2m 2.2#  2m 2.2#  2m 2.2#  2m 2.2#  ball  2m    Powerweb   BPW 3x10 BPW  2x30 BPW  2x30 BPW 2x30 BPW 2x30 BPW 2x30 BPW 2x30 BlackPW 3x10 BPW  5x10 BPW  3x10   Powerweb  simulating opening jar/wrist radial deviation GPW 3x10         YPW  3x10 GPW  10x3  Wrist/forearm stabilization with little oscillations GPW 3x10    Scrub   1m  50%            Ther Activity             Peg  w. supination              Pinch pins  Blue  3x10 Blue 5 sets, varying grasps Black  1 set  Blue  2 sets  black  3sets Blue  3 sets G  3sets  G 3 sets, varying grasps Blue   5 sets Blue  3x10    Work sim lift  B  11# 2x10 11#  3x10  11#  3x10  #11 3x10 ball 11#  3x10  ball 11# ball  3x10 #11 ball 3x10 11#  2x10 11#  2x10   UBE  5m 5m   6m 6m 6m level 1 6m level 2.5 6m 5m                Modalities             MH R 8m 8m 8m 8m 8m 6m 6m 8m 6m 6m   CP post Post tx 5m 5m 5m 5m 5m 5m 5m

## 2025-03-21 ENCOUNTER — TELEPHONE (OUTPATIENT)
Age: 40
End: 2025-03-21

## 2025-03-21 NOTE — TELEPHONE ENCOUNTER
Caller: Patient    Doctor: Dr. Barone    Reason for call: Had to cancel 3/24 appt. Rescheduled to next available appt 5/12. Patient questioned if she should be seen sooner?    Call back#: [phone]

## 2025-08-07 ENCOUNTER — VBI (OUTPATIENT)
Dept: ADMINISTRATIVE | Facility: OTHER | Age: 40
End: 2025-08-07

## 2025-08-07 ENCOUNTER — PATIENT MESSAGE (OUTPATIENT)
Age: 40
End: 2025-08-07

## 2025-08-14 PROBLEM — R87.810 ASCUS WITH POSITIVE HIGH RISK HPV CERVICAL: Status: ACTIVE | Noted: 2025-08-14

## 2025-08-14 PROBLEM — R41.840 DIFFICULTY CONCENTRATING: Status: ACTIVE | Noted: 2025-08-14

## 2025-08-14 PROBLEM — M65.4 DE QUERVAIN'S TENOSYNOVITIS, RIGHT: Status: ACTIVE | Noted: 2025-08-14

## 2025-08-14 PROBLEM — G89.29 CHRONIC WRIST PAIN: Status: ACTIVE | Noted: 2025-08-14

## 2025-08-14 PROBLEM — N94.6 PAINFUL MENSTRUAL PERIODS: Status: ACTIVE | Noted: 2025-08-14

## 2025-08-14 PROBLEM — M25.539 CHRONIC WRIST PAIN: Status: ACTIVE | Noted: 2025-08-14

## 2025-08-14 PROBLEM — Z91.013 ALLERGIC TO SHELLFISH: Status: ACTIVE | Noted: 2025-08-14

## 2025-08-14 PROBLEM — F43.9 STRESS: Status: ACTIVE | Noted: 2025-08-14

## 2025-08-14 PROBLEM — N92.6 IRREGULAR MENSTRUAL CYCLE: Status: ACTIVE | Noted: 2025-08-14

## 2025-08-14 PROBLEM — F41.9 ANXIETY: Status: ACTIVE | Noted: 2025-08-14

## 2025-08-14 PROBLEM — R87.610 ASCUS WITH POSITIVE HIGH RISK HPV CERVICAL: Status: ACTIVE | Noted: 2025-08-14

## 2025-08-14 PROBLEM — Z80.3 FAMILY HISTORY OF BREAST CANCER: Status: ACTIVE | Noted: 2025-08-14

## 2025-08-14 PROBLEM — M54.2 NECK PAIN: Status: ACTIVE | Noted: 2025-08-14

## 2025-08-14 PROBLEM — G47.00 INSOMNIA: Status: ACTIVE | Noted: 2025-08-14

## 2025-08-14 PROBLEM — F33.9 RECURRENT MAJOR DEPRESSION (HCC): Status: ACTIVE | Noted: 2025-08-14

## 2025-08-14 PROBLEM — N87.9 CERVICAL DYSPLASIA: Status: ACTIVE | Noted: 2025-08-14

## 2025-08-14 PROBLEM — R68.82 DECREASED LIBIDO: Status: ACTIVE | Noted: 2025-08-14

## (undated) DEVICE — TUBING SUCTION 5MM X 12 FT

## (undated) DEVICE — ACE WRAP 3 IN STERILE

## (undated) DEVICE — GAUZE SPONGES,16 PLY: Brand: CURITY

## (undated) DEVICE — PAD CAST 4 IN COTTON NON STERILE

## (undated) DEVICE — STERILE BETHLEHEM PLASTIC HAND: Brand: CARDINAL HEALTH

## (undated) DEVICE — SUT VICRYL 3-0 SH 27 IN J416H

## (undated) DEVICE — PADDING CAST 4 IN  COTTON STRL

## (undated) DEVICE — STRAPS, 2 OF 24", 1 OF 36": Brand: ACUMED

## (undated) DEVICE — COBAN 4 IN STERILE

## (undated) DEVICE — GLOVE SRG BIOGEL 7.5

## (undated) DEVICE — TFCC MENDER DISPOSABLE SUTURE SYSTEM

## (undated) DEVICE — NEEDLE 18 G X 1 1/2

## (undated) DEVICE — NEEDLE 25G X 1 1/2

## (undated) DEVICE — BLADE SHAVER SABRE 2MM  7CM COOLCUT

## (undated) DEVICE — GLOVE INDICATOR PI UNDERGLOVE SZ 8 BLUE

## (undated) DEVICE — SUT PROLENE 4-0 PS-2 18 IN 8682H

## (undated) DEVICE — OCCLUSIVE GAUZE STRIP,3% BISMUTH TRIBROMOPHENATE IN PETROLATUM BLEND: Brand: XEROFORM

## (undated) DEVICE — ACE WRAP 4 IN UNSTERILE

## (undated) DEVICE — SKN PRP WNG SPNGE PVP SCRB STR: Brand: MEDLINE INDUSTRIES, INC.

## (undated) DEVICE — IV EXTENSION TUBING 33 IN

## (undated) DEVICE — STERI DRAPE 1000 NON-STERILE ROLL

## (undated) DEVICE — CUFF TOURNIQUET 18 X 4 IN QUICK CONNECT DISP 1 BLADDER